# Patient Record
Sex: FEMALE | Race: WHITE | NOT HISPANIC OR LATINO | Employment: OTHER | URBAN - METROPOLITAN AREA
[De-identification: names, ages, dates, MRNs, and addresses within clinical notes are randomized per-mention and may not be internally consistent; named-entity substitution may affect disease eponyms.]

---

## 2018-08-16 ENCOUNTER — OFFICE VISIT (OUTPATIENT)
Dept: URGENT CARE | Facility: PHYSICIAN GROUP | Age: 53
End: 2018-08-16
Payer: MEDICARE

## 2018-08-16 ENCOUNTER — HOSPITAL ENCOUNTER (OUTPATIENT)
Facility: MEDICAL CENTER | Age: 53
End: 2018-08-16
Attending: PHYSICIAN ASSISTANT
Payer: MEDICARE

## 2018-08-16 VITALS
SYSTOLIC BLOOD PRESSURE: 138 MMHG | RESPIRATION RATE: 14 BRPM | WEIGHT: 144.8 LBS | TEMPERATURE: 98.8 F | HEIGHT: 67 IN | OXYGEN SATURATION: 98 % | BODY MASS INDEX: 22.73 KG/M2 | HEART RATE: 53 BPM | DIASTOLIC BLOOD PRESSURE: 84 MMHG

## 2018-08-16 DIAGNOSIS — F41.0 PANIC ATTACK: ICD-10-CM

## 2018-08-16 DIAGNOSIS — I49.3 PVC (PREMATURE VENTRICULAR CONTRACTION): ICD-10-CM

## 2018-08-16 DIAGNOSIS — F41.9 ANXIETY AND DEPRESSION: ICD-10-CM

## 2018-08-16 DIAGNOSIS — F32.A ANXIETY AND DEPRESSION: ICD-10-CM

## 2018-08-16 DIAGNOSIS — Z86.39 HISTORY OF HYPOKALEMIA: ICD-10-CM

## 2018-08-16 PROCEDURE — 80048 BASIC METABOLIC PNL TOTAL CA: CPT

## 2018-08-16 PROCEDURE — 99204 OFFICE O/P NEW MOD 45 MIN: CPT | Performed by: PHYSICIAN ASSISTANT

## 2018-08-16 RX ORDER — LISINOPRIL 10 MG/1
10 TABLET ORAL DAILY
COMMUNITY
End: 2019-02-01 | Stop reason: CLARIF

## 2018-08-16 RX ORDER — ZIPRASIDONE HYDROCHLORIDE 60 MG/1
60 CAPSULE ORAL DAILY
COMMUNITY
End: 2019-02-01

## 2018-08-16 RX ORDER — DULOXETIN HYDROCHLORIDE 60 MG/1
60 CAPSULE, DELAYED RELEASE ORAL DAILY
COMMUNITY
End: 2019-02-15 | Stop reason: SDUPTHER

## 2018-08-16 RX ORDER — POTASSIUM CHLORIDE 20MEQ/15ML
20 LIQUID (ML) ORAL DAILY
COMMUNITY
End: 2019-02-01 | Stop reason: CLARIF

## 2018-08-16 RX ORDER — VALACYCLOVIR HYDROCHLORIDE 1 G/1
1000 TABLET, FILM COATED ORAL
COMMUNITY
End: 2019-07-01 | Stop reason: SDUPTHER

## 2018-08-16 RX ORDER — LORAZEPAM 0.5 MG/1
TABLET ORAL
Qty: 10 TAB | Refills: 0 | Status: SHIPPED | OUTPATIENT
Start: 2018-08-16 | End: 2018-08-20

## 2018-08-16 RX ORDER — TRAZODONE HYDROCHLORIDE 100 MG/1
100 TABLET ORAL NIGHTLY
COMMUNITY
End: 2019-02-03

## 2018-08-16 RX ORDER — DULOXETIN HYDROCHLORIDE 30 MG/1
30 CAPSULE, DELAYED RELEASE ORAL DAILY
COMMUNITY
End: 2018-12-28

## 2018-08-16 RX ORDER — SUCRALFATE 1 G/1
1 TABLET ORAL
COMMUNITY
End: 2019-02-01 | Stop reason: CLARIF

## 2018-08-17 LAB
ANION GAP SERPL CALC-SCNC: 12 MMOL/L (ref 0–11.9)
BUN SERPL-MCNC: 16 MG/DL (ref 8–22)
CALCIUM SERPL-MCNC: 9.8 MG/DL (ref 8.5–10.5)
CHLORIDE SERPL-SCNC: 103 MMOL/L (ref 96–112)
CO2 SERPL-SCNC: 25 MMOL/L (ref 20–33)
CREAT SERPL-MCNC: 0.92 MG/DL (ref 0.5–1.4)
GLUCOSE SERPL-MCNC: 66 MG/DL (ref 65–99)
POTASSIUM SERPL-SCNC: 3.7 MMOL/L (ref 3.6–5.5)
SODIUM SERPL-SCNC: 140 MMOL/L (ref 135–145)

## 2018-08-19 ENCOUNTER — TELEPHONE (OUTPATIENT)
Dept: URGENT CARE | Facility: PHYSICIAN GROUP | Age: 53
End: 2018-08-19

## 2018-08-21 ENCOUNTER — TELEPHONE (OUTPATIENT)
Dept: URGENT CARE | Facility: PHYSICIAN GROUP | Age: 53
End: 2018-08-21

## 2018-08-21 NOTE — PROGRESS NOTES
Chief Complaint   Patient presents with   • Panic Attack     long Hx of anxiety. worse today.       HISTORY OF PRESENT ILLNESS: Patient is a 52 y.o. female who presents today for panic attack/recurrence of anxiety.    Patient states she is currently splitting her time between here and [place] and that she and her  recently assumed roles of grandparents.   She feels this is the main catalyst currently but is also unsure that her medication regimen currently is ideal. She feels her depression is under good control and denies any SI or intent of self harm.   She has psychiatrist in [place] but no care providers in [facility].   She feels she had panic attack today, it is a bit better than few hours ago but still feels stressed.  She notes she has skipped beats and palpitations when this happens.  This has been investigated by Cardiology and has far as she knows after recent testing everything was apparently normal.  She does have two cups of coffee in the morning but otherwise no other stimulants.  She does also note 2 weeks ago she was found to be hypokalemic (unknown cause) and this worries her to get checked again.      There are no active problems to display for this patient.      Allergies:Patient has no known allergies.    Current Outpatient Prescriptions Ordered in [facility]   Medication Sig Dispense Refill   • DULoxetine (CYMBALTA) 30 MG Cap DR Particles Take 30 mg by mouth every day.     • potassium chloride (KAYCIEL) 20 MEQ/15ML (10%) Solution Take 20 mEq by mouth every day.     • Omeprazole (PRILOSEC PO) Take 20 mg by mouth every day.     • ziprasidone (GEODON) 60 MG Cap Take 60 mg by mouth every day.     • traZODone (DESYREL) 150 MG Tab Take 150 mg by mouth every evening.     • DULoxetine (CYMBALTA) 60 MG Cap DR Particles delayed-release capsule Take 60 mg by mouth every day.     • sucralfate (CARAFATE) 1 GM Tab Take 1 g by mouth 4 Times a Day,Before Meals and at Bedtime.     • lisinopril (PRINIVIL)  "10 MG Tab Take 10 mg by mouth every day.     • valacyclovir (VALTREX) 1 GM Tab Take 1,000 mg by mouth Once.       No current Epic-ordered facility-administered medications on file.        History reviewed. No pertinent past medical history.    Social History   Substance Use Topics   • Smoking status: Current Every Day Smoker     Packs/day: 10.00   • Smokeless tobacco: Never Used   • Alcohol use Not on file       No family status information on file.   History reviewed. No pertinent family history.    ROS:  Review of Systems   Constitutional: Negative for fever, chills, weight loss and malaise/fatigue.   HENT: Negative for ear pain, nosebleeds, congestion, sore throat and neck pain.    Eyes: Negative for blurred vision.   Respiratory: Negative for cough, sputum production, shortness of breath and wheezing.    Cardiovascular: SEE HPI  Gastrointestinal: Negative for heartburn, nausea, vomiting and abdominal pain.   Genitourinary: Negative for dysuria, urgency and frequency.   Psych: SEE HPI  All other systems reviewed and are negative.       Exam:  Blood pressure 138/84, pulse (!) 53, temperature 37.1 °C (98.8 °F), resp. rate 14, height 1.702 m (5' 7\"), weight 65.7 kg (144 lb 12.8 oz), SpO2 98 %.  General:  Well nourished, well developed female in NAD  Head: Normocephalic, atraumatic.   Eyes: PERRLA, EOM within normal limits, no conjunctival injection, no scleral icterus, visual fields and acuity grossly intact.  Ears: Normal shape and symmetry, no tenderness, no discharge. External canals are without any significant edema or erythema. Tympanic membranes are without any inflammation, no effusion. Gross auditory acuity is intact  Nose: Symmetrical, sinuses without tenderness, no discharge.   Mouth: reasonable hygiene, no erythema exudates or tonsillar enlargement.  Neck: no masses, range of motion within normal limits, no tracheal deviation. No lymphadenopathy  No thyromegaly.   Pulmonary: Normal respiratory effort, no " wheezes, crackles, or rhonchi.  Cardiovascular: regular rate and rhythm without murmurs, rubs, or gallops.  Skin: No visible rashes or lesion. Warm, pink, dry.   Extremities: no clubbing, cyanosis, or edema.  Neuro: A&O x 3. Speech normal/clear.  Normal gait.   Psych: Slightly anxious mood with normal affect.   She is clear thought content and judgement.  Speech is normal and fluid.       EKG Interpretation   Interpreted by me   Rhythm: normal sinus   Rate: normal   Axis: normal   Ectopy: 1 PVC between two rhythm strips.   Conduction: normal   ST Segments: no acute change   T Waves: no acute change   Q Waves: none   Clinical Impression: occasional PVC, otherwise WNL    Component Results     Component Value Ref Range & Units Status   Sodium 140  135 - 145 mmol/L Final   Potassium 3.7  3.6 - 5.5 mmol/L Final   Chloride 103  96 - 112 mmol/L Final   Co2 25  20 - 33 mmol/L Final   Glucose 66  65 - 99 mg/dL Final   Bun 16  8 - 22 mg/dL Final   Creatinine 0.92  0.50 - 1.40 mg/dL Final   Calcium 9.8  8.5 - 10.5 mg/dL Final   Anion Gap 12.0   0.0 - 11.9 Final         Assessment/Plan:  1. Panic attack  REFERRAL TO FOLLOW-UP WITH PRIMARY CARE    REFERRAL TO BEHAVIORAL HEALTH    LORazepam (ATIVAN) 0.5 MG Tab   2. Anxiety and depression  REFERRAL TO FOLLOW-UP WITH PRIMARY CARE    REFERRAL TO BEHAVIORAL HEALTH   3. PVC (premature ventricular contraction)     4. History of hypokalemia  BASIC METABOLIC PANEL    BASIC METABOLIC PANEL         -EKG without concerning changes currently, PVC noted. Continue to follow up with Cardio as previously instructed in [place].   ER precautions regarding palpitations, chest pain etc discussed however  -Potassium recheck WNL currently.   -reviewed PMH with regards to patient anxiety.  Patient is currently on regimen that will need to be addressed with mental health/psychiatry.  Patient requesting Ativan for acute panic attack in the interim due to increase frequency and success using in the past.    "Patient requesting \"high dose\" however discussed this is not appropriate to start at and not through a UC.   Will give #10 0.5 mg tablets and she may take 1-2 every 8 hours prn.     She is to follow up with new PCP in [place] and Behavioral health.  Referrals placed and ER precautions discussed.   No ETOH on benzodiazepine discussed.          Supportive care, differential diagnoses, and indications for immediate follow-up discussed with patient.   Pathogenesis of diagnosis discussed including typical length and natural progression.   Instructed to return to clinic or nearest emergency department for any change in condition, further concerns, or worsening of symptoms.  Patient states understanding of the plan of care and discharge instructions.  Instructed to make an appointment, for follow up, with their primary care provider.      Lauren Schuler P.A.-C.    "

## 2018-08-21 NOTE — TELEPHONE ENCOUNTER
----- Message from Chinyere Bonilla, Med Ass't sent at 8/19/2018 11:35 AM PDT -----  Regarding: FW: Normal potassium, patient waiting to hear.    [facility] for pt to call back  ----- Message -----  From: Lauren Schuler P.A.-C.  Sent: 8/17/2018   8:22 AM  To: [facility]  Subject: Normal potassium, patient waiting to hear.       Hello!    Please let patient know that her potassium level was 3.7, which is normal.     Thank you!!!!!      ----- Message -----  From: Intf, Lab  Sent: 8/17/2018  12:13 AM  To: Lauren Schuler P.A.-C.

## 2018-10-18 ENCOUNTER — OFFICE VISIT (OUTPATIENT)
Dept: URGENT CARE | Facility: PHYSICIAN GROUP | Age: 53
End: 2018-10-18
Payer: MEDICARE

## 2018-10-18 ENCOUNTER — HOSPITAL ENCOUNTER (EMERGENCY)
Facility: MEDICAL CENTER | Age: 53
End: 2018-10-18
Payer: MEDICARE

## 2018-10-18 VITALS
HEART RATE: 92 BPM | TEMPERATURE: 98.2 F | OXYGEN SATURATION: 100 % | BODY MASS INDEX: 22.15 KG/M2 | SYSTOLIC BLOOD PRESSURE: 131 MMHG | RESPIRATION RATE: 16 BRPM | DIASTOLIC BLOOD PRESSURE: 93 MMHG | HEIGHT: 67 IN | WEIGHT: 141.09 LBS

## 2018-10-18 VITALS
RESPIRATION RATE: 15 BRPM | WEIGHT: 130 LBS | BODY MASS INDEX: 20.4 KG/M2 | HEIGHT: 67 IN | TEMPERATURE: 98.5 F | HEART RATE: 90 BPM | OXYGEN SATURATION: 99 % | DIASTOLIC BLOOD PRESSURE: 70 MMHG | SYSTOLIC BLOOD PRESSURE: 118 MMHG

## 2018-10-18 DIAGNOSIS — R07.89 CHEST DISCOMFORT: ICD-10-CM

## 2018-10-18 DIAGNOSIS — Z86.59 HISTORY OF PANIC ATTACKS: ICD-10-CM

## 2018-10-18 LAB — EKG IMPRESSION: NORMAL

## 2018-10-18 PROCEDURE — 93005 ELECTROCARDIOGRAM TRACING: CPT

## 2018-10-18 PROCEDURE — 302449 STATCHG TRIAGE ONLY (STATISTIC)

## 2018-10-18 PROCEDURE — 99214 OFFICE O/P EST MOD 30 MIN: CPT | Performed by: PHYSICIAN ASSISTANT

## 2018-10-18 RX ORDER — ASPIRIN 81 MG/1
324 TABLET, CHEWABLE ORAL ONCE
Status: COMPLETED | OUTPATIENT
Start: 2018-10-18 | End: 2018-10-18

## 2018-10-18 RX ADMIN — ASPIRIN 324 MG: 81 TABLET, CHEWABLE ORAL at 06:00

## 2018-10-18 ASSESSMENT — PAIN SCALES - GENERAL: PAINLEVEL_OUTOF10: 2

## 2018-10-19 NOTE — PROGRESS NOTES
"Chief Complaint   Patient presents with   • Chest Pain     anxiety attack        HISTORY OF PRESENT ILLNESS: Patient is a 52 y.o. female who presents today for about 6-7 hours of waxing and waning severity of chest discomfort/chest pressure. Started in the morning, felt it doing chores around the house.   Patient states it is slightly better currently from when it started however is still present.  Patient states she is SOB and feels her heart is racing.  Patient has had symptoms of chest pressure and discomfort in the past and also has hx of panic attacks.  Patient does admit this pain however \"feels different\" ,  \"new chest pain\"    \"I just don't feel right\" she states.  Patient has had a cardiac work up in the past and as far as she knows everything has been normal.  She does have 1 ppd smoking hx.  No leg swelling.   Patient does endorse feeling anxious.     There are no active problems to display for this patient.      Allergies:Patient has no known allergies.    Current Outpatient Prescriptions Ordered in [facility]   Medication Sig Dispense Refill   • DULoxetine (CYMBALTA) 30 MG Cap DR Particles Take 30 mg by mouth every day.     • potassium chloride (KAYCIEL) 20 MEQ/15ML (10%) Solution Take 20 mEq by mouth every day.     • Omeprazole (PRILOSEC PO) Take 20 mg by mouth every day.     • ziprasidone (GEODON) 60 MG Cap Take 60 mg by mouth every day.     • traZODone (DESYREL) 150 MG Tab Take 150 mg by mouth every evening.     • DULoxetine (CYMBALTA) 60 MG Cap DR Particles delayed-release capsule Take 60 mg by mouth every day.     • sucralfate (CARAFATE) 1 GM Tab Take 1 g by mouth 4 Times a Day,Before Meals and at Bedtime.     • lisinopril (PRINIVIL) 10 MG Tab Take 10 mg by mouth every day.     • valacyclovir (VALTREX) 1 GM Tab Take 1,000 mg by mouth Once.       No current Epic-ordered facility-administered medications on file.        No past medical history on file.    Social History   Substance Use Topics   • Smoking " "status: Current Every Day Smoker     Packs/day: 1.00   • Smokeless tobacco: Never Used   • Alcohol use Not on file       No family status information on file.   No family history on file.     ROS:  Review of Systems   Constitutional: Negative for fever, chills, weight loss and malaise/fatigue.   HENT: Negative for ear pain, nosebleeds, congestion, sore throat and neck pain.    Eyes: Negative for blurred vision.   Respiratory: SEE HPI  Cardiovascular: SEE HPI  Gastrointestinal: Negative for heartburn, nausea, vomiting and abdominal pain.   Genitourinary: Negative for dysuria, urgency and frequency.   Psych: SEE HPI    Exam:  Blood pressure 118/70, pulse 90, temperature 36.9 °C (98.5 °F), temperature source Temporal, resp. rate 15, height 1.702 m (5' 7\"), weight 59 kg (130 lb), SpO2 99 %.  General:  Well nourished, well developed female in NAD however is anxious appearing.   Eyes: PERRLA, EOM within normal limits, no conjunctival injection, no scleral icterus, visual fields and acuity grossly intact.  Ears: Normal shape and symmetry, no tenderness, no discharge. External canals are without any significant edema or erythema. Tympanic membranes are without any inflammation, no effusion. Gross auditory acuity is intact  Nose: Symmetrical, sinuses without tenderness, no discharge.   Mouth: reasonable hygiene, no erythema exudates or tonsillar enlargement.  Neck: no masses, range of motion within normal limits, no tracheal deviation. No lymphadenopathy  Pulmonary: Increased resp effort at times,  no wheezes, crackles, or rhonchi.  Cardiovascular: regular rate and rhythm without murmurs, rubs, or gallops.  Skin: No visible rashes or lesion. Warm, pink, dry.   Extremities: no clubbing, cyanosis, or edema.  Neuro: A&O x 3. Speech normal/clear.  Normal gait.       Assessment/Plan:  1. Chest discomfort  EKG    UC AMA/Refusal of Treatment    aspirin (ASA) chewable tab 324 mg   2. History of panic attacks           -new EKG " changes noted in setting of acute chest pain, per patient feels different from the chest tightness she gets from panic attacks.  She notes she is slightly better this afternoon however discussed that I recommend further evaluation given presentation.   Urged EMS transport however patient and  decline. AMA signed.  Patient did take aspirin as above and left POV with  stating she would proceed straight to ED.       Lauren Schuler P.A.-C.

## 2018-10-19 NOTE — ED TRIAGE NOTES
Arabella Anglin  52 y.o.  Chief Complaint   Patient presents with   • Chest Pain     sudden onset this morning, denies ripping/tearning sensation; mid chest pain, non radiating.   • Shortness of Breath   • Anxiety     EKG completed.    Explained wait time and triage process to pt. Pt placed back out in lobby, told to notify ED tech or triage RN of any changes, verbalized understanding.

## 2018-10-24 ENCOUNTER — OFFICE VISIT (OUTPATIENT)
Dept: URGENT CARE | Facility: PHYSICIAN GROUP | Age: 53
End: 2018-10-24
Payer: MEDICARE

## 2018-10-24 VITALS
BODY MASS INDEX: 23.54 KG/M2 | SYSTOLIC BLOOD PRESSURE: 122 MMHG | TEMPERATURE: 98.5 F | HEART RATE: 77 BPM | RESPIRATION RATE: 16 BRPM | OXYGEN SATURATION: 97 % | DIASTOLIC BLOOD PRESSURE: 74 MMHG | WEIGHT: 150 LBS | HEIGHT: 67 IN

## 2018-10-24 DIAGNOSIS — R06.2 WHEEZING: ICD-10-CM

## 2018-10-24 DIAGNOSIS — J01.00 ACUTE NON-RECURRENT MAXILLARY SINUSITIS: ICD-10-CM

## 2018-10-24 PROCEDURE — 99214 OFFICE O/P EST MOD 30 MIN: CPT | Performed by: PHYSICIAN ASSISTANT

## 2018-10-24 RX ORDER — ALBUTEROL SULFATE 90 UG/1
2 AEROSOL, METERED RESPIRATORY (INHALATION) EVERY 6 HOURS PRN
Qty: 8.5 G | Refills: 0 | Status: SHIPPED | OUTPATIENT
Start: 2018-10-24 | End: 2018-12-28

## 2018-10-24 RX ORDER — FLUTICASONE PROPIONATE 50 MCG
2 SPRAY, SUSPENSION (ML) NASAL DAILY
Qty: 16 G | Refills: 0 | Status: SHIPPED | OUTPATIENT
Start: 2018-10-24 | End: 2018-12-28

## 2018-10-24 RX ORDER — AMOXICILLIN AND CLAVULANATE POTASSIUM 875; 125 MG/1; MG/1
1 TABLET, FILM COATED ORAL 2 TIMES DAILY
Qty: 14 TAB | Refills: 0 | Status: SHIPPED | OUTPATIENT
Start: 2018-10-24 | End: 2018-10-31

## 2018-10-24 RX ORDER — IBUPROFEN 800 MG/1
800 TABLET ORAL EVERY 8 HOURS PRN
Qty: 30 TAB | Refills: 0 | Status: SHIPPED | OUTPATIENT
Start: 2018-10-24 | End: 2018-12-28

## 2018-10-24 ASSESSMENT — ENCOUNTER SYMPTOMS
SHORTNESS OF BREATH: 0
CHILLS: 0
DIZZINESS: 0
SINUS PRESSURE: 1
NAUSEA: 0
VOMITING: 0
FEVER: 0
SORE THROAT: 1
MUSCULOSKELETAL NEGATIVE: 1
SPUTUM PRODUCTION: 0
SINUS PAIN: 1
COUGH: 1
WHEEZING: 1
ABDOMINAL PAIN: 0
DIARRHEA: 0
HEADACHES: 1

## 2018-10-24 NOTE — PROGRESS NOTES
"Subjective:      Arabella Anglin is a 52 y.o. female who presents with Cough (chest congerstion, sinsus headache x 4 days )            Sinus Problem   This is a new problem. The current episode started in the past 7 days. The problem is unchanged. There has been no fever. Her pain is at a severity of 0/10. She is experiencing no pain. Associated symptoms include congestion, coughing, ear pain, headaches, sinus pressure and a sore throat. Pertinent negatives include no chills or shortness of breath. Past treatments include nothing.       Review of Systems   Constitutional: Negative for chills and fever.   HENT: Positive for congestion, ear pain, sinus pain, sinus pressure and sore throat.    Respiratory: Positive for cough and wheezing. Negative for sputum production and shortness of breath.    Cardiovascular: Negative for chest pain.   Gastrointestinal: Negative for abdominal pain, diarrhea, nausea and vomiting.   Genitourinary: Negative.    Musculoskeletal: Negative.    Skin: Negative for rash.   Neurological: Positive for headaches. Negative for dizziness.        Objective:     /74   Pulse 77   Temp 36.9 °C (98.5 °F) (Temporal)   Resp 16   Ht 1.702 m (5' 7\")   Wt 68 kg (150 lb)   SpO2 97%   BMI 23.49 kg/m²      Physical Exam   Constitutional: She is oriented to person, place, and time. She appears well-developed and well-nourished. No distress.   HENT:   Head: Normocephalic and atraumatic.   Right Ear: Hearing, tympanic membrane, external ear and ear canal normal.   Left Ear: Hearing, tympanic membrane, external ear and ear canal normal.   Nose: Mucosal edema and rhinorrhea present. Right sinus exhibits maxillary sinus tenderness. Right sinus exhibits no frontal sinus tenderness. Left sinus exhibits maxillary sinus tenderness. Left sinus exhibits no frontal sinus tenderness.   Mouth/Throat: Oropharynx is clear and moist. No posterior oropharyngeal edema or posterior oropharyngeal erythema. "   Eyes: Pupils are equal, round, and reactive to light. Conjunctivae are normal. Right eye exhibits no discharge. Left eye exhibits no discharge.   Neck: Normal range of motion.   Cardiovascular: Normal rate, regular rhythm and normal heart sounds.    No murmur heard.  Pulmonary/Chest: Effort normal. No respiratory distress. She has wheezes. She has no rales.   Musculoskeletal: Normal range of motion.   Lymphadenopathy:     She has no cervical adenopathy.   Neurological: She is alert and oriented to person, place, and time.   Skin: Skin is warm and dry. She is not diaphoretic.   Psychiatric: She has a normal mood and affect. Her behavior is normal.   Nursing note and vitals reviewed.         PMH:  has no past medical history on file.  MEDS:   Current Outpatient Prescriptions:   •  amoxicillin-clavulanate (AUGMENTIN) 875-125 MG Tab, Take 1 Tab by mouth 2 times a day for 7 days., Disp: 14 Tab, Rfl: 0  •  fluticasone (FLONASE) 50 MCG/ACT nasal spray, Spray 2 Sprays in nose every day., Disp: 16 g, Rfl: 0  •  ibuprofen (MOTRIN) 800 MG Tab, Take 1 Tab by mouth every 8 hours as needed., Disp: 30 Tab, Rfl: 0  •  albuterol 108 (90 Base) MCG/ACT Aero Soln inhalation aerosol, Inhale 2 Puffs by mouth every 6 hours as needed for Shortness of Breath., Disp: 8.5 g, Rfl: 0  •  DULoxetine (CYMBALTA) 30 MG Cap DR Particles, Take 30 mg by mouth every day., Disp: , Rfl:   •  potassium chloride (KAYCIEL) 20 MEQ/15ML (10%) Solution, Take 20 mEq by mouth every day., Disp: , Rfl:   •  Omeprazole (PRILOSEC PO), Take 20 mg by mouth every day., Disp: , Rfl:   •  ziprasidone (GEODON) 60 MG Cap, Take 60 mg by mouth every day., Disp: , Rfl:   •  traZODone (DESYREL) 150 MG Tab, Take 150 mg by mouth every evening., Disp: , Rfl:   •  DULoxetine (CYMBALTA) 60 MG Cap DR Particles delayed-release capsule, Take 60 mg by mouth every day., Disp: , Rfl:   •  sucralfate (CARAFATE) 1 GM Tab, Take 1 g by mouth 4 Times a Day,Before Meals and at Bedtime.,  Disp: , Rfl:   •  lisinopril (PRINIVIL) 10 MG Tab, Take 10 mg by mouth every day., Disp: , Rfl:   •  valacyclovir (VALTREX) 1 GM Tab, Take 1,000 mg by mouth Once., Disp: , Rfl:   ALLERGIES: No Known Allergies  SURGHX: History reviewed. No pertinent surgical history.  SOCHX:  reports that she has been smoking.  She has been smoking about 0.50 packs per day. She has never used smokeless tobacco. She reports that she uses drugs, including Inhaled. She reports that she does not drink alcohol.  FH: family history is not on file.       Assessment/Plan:     1. Acute non-recurrent maxillary sinusitis  - amoxicillin-clavulanate (AUGMENTIN) 875-125 MG Tab; Take 1 Tab by mouth 2 times a day for 7 days.  Dispense: 14 Tab; Refill: 0  - fluticasone (FLONASE) 50 MCG/ACT nasal spray; Spray 2 Sprays in nose every day.  Dispense: 16 g; Refill: 0  - ibuprofen (MOTRIN) 800 MG Tab; Take 1 Tab by mouth every 8 hours as needed.  Dispense: 30 Tab; Refill: 0    Advised patient symptoms are most likely viral in etiology, recommend supportive care. Increased fluids and rest. Discussed use of nedi-pot, humidifier, and Flonase nasal spray for symptomatic relief. Contingent course of antibiotics given if symptoms persist/worsen over the next 5-7 days. The patient demonstrated a good understanding and agreed with the treatment plan.    2. Wheezing  - albuterol 108 (90 Base) MCG/ACT Aero Soln inhalation aerosol; Inhale 2 Puffs by mouth every 6 hours as needed for Shortness of Breath.  Dispense: 8.5 g; Refill: 0

## 2018-12-28 ENCOUNTER — OFFICE VISIT (OUTPATIENT)
Dept: URGENT CARE | Facility: PHYSICIAN GROUP | Age: 53
End: 2018-12-28
Payer: MEDICARE

## 2018-12-28 VITALS
WEIGHT: 155 LBS | BODY MASS INDEX: 24.33 KG/M2 | HEIGHT: 67 IN | SYSTOLIC BLOOD PRESSURE: 120 MMHG | TEMPERATURE: 97.4 F | DIASTOLIC BLOOD PRESSURE: 70 MMHG | OXYGEN SATURATION: 96 % | HEART RATE: 66 BPM

## 2018-12-28 DIAGNOSIS — J02.0 ACUTE STREPTOCOCCAL PHARYNGITIS: ICD-10-CM

## 2018-12-28 LAB
INT CON NEG: NORMAL
INT CON POS: NORMAL
S PYO AG THROAT QL: POSITIVE

## 2018-12-28 PROCEDURE — 99214 OFFICE O/P EST MOD 30 MIN: CPT | Performed by: PHYSICIAN ASSISTANT

## 2018-12-28 PROCEDURE — 87880 STREP A ASSAY W/OPTIC: CPT | Performed by: PHYSICIAN ASSISTANT

## 2018-12-28 RX ORDER — AMOXICILLIN 875 MG/1
875 TABLET, COATED ORAL 2 TIMES DAILY
Qty: 20 TAB | Refills: 0 | Status: SHIPPED | OUTPATIENT
Start: 2018-12-28 | End: 2019-02-01 | Stop reason: CLARIF

## 2018-12-29 NOTE — PROGRESS NOTES
Chief Complaint   Patient presents with   • Pharyngitis     Swollen glands, hurts to eat and swollow x 2 days        HISTORY OF PRESENT ILLNESS: Patient is a 53 y.o. female who presents today for 2 days of worsening sore throat, body aches.   She has not had URI symptoms, notes slight lingering cough from the last few weeks.  Sore throat is diffuse, aching and notes most pain when trying to swallow. No nausea or vomiting. No abdominal pain.  Has not taken anything today for her symptoms.     There are no active problems to display for this patient.      Allergies:Patient has no known allergies.    Current Outpatient Prescriptions Ordered in [facility]   Medication Sig Dispense Refill   • traZODone (DESYREL) 150 MG Tab Take 150 mg by mouth every evening.     • DULoxetine (CYMBALTA) 60 MG Cap DR Particles delayed-release capsule Take 60 mg by mouth every day.     • valacyclovir (VALTREX) 1 GM Tab Take 1,000 mg by mouth Once.     • potassium chloride (KAYCIEL) 20 MEQ/15ML (10%) Solution Take 20 mEq by mouth every day.     • Omeprazole (PRILOSEC PO) Take 20 mg by mouth every day.     • ziprasidone (GEODON) 60 MG Cap Take 60 mg by mouth every day.     • sucralfate (CARAFATE) 1 GM Tab Take 1 g by mouth 4 Times a Day,Before Meals and at Bedtime.     • lisinopril (PRINIVIL) 10 MG Tab Take 10 mg by mouth every day.       No current [facility]-ordered facility-administered medications on file.        No past medical history on file.    Social History   Substance Use Topics   • Smoking status: Current Every Day Smoker     Packs/day: 0.50   • Smokeless tobacco: Never Used   • Alcohol use No       No family status information on file.   No family history on file.    ROS:  Review of Systems   Constitutional: SEE HPI  HENT: SEE HPI  Eyes: Negative for blurred vision.   Respiratory:  Slight coughing without , sputum production, shortness of breath or wheezing.    Cardiovascular: Negative for chest pain, palpitations, orthopnea and leg  "swelling.   Gastrointestinal: Negative for heartburn, nausea, vomiting and abdominal pain.   Genitourinary: Negative for dysuria, urgency and frequency.     Exam:  Blood pressure 120/70, pulse 66, temperature 36.3 °C (97.4 °F), temperature source Temporal, height 1.702 m (5' 7\"), weight 70.3 kg (155 lb), SpO2 96 %.  General:  Well nourished, well developed female in NAD  Eyes: PERRLA, EOM within normal limits, no conjunctival injection, no scleral icterus, visual fields and acuity grossly intact.  Ears: Normal shape and symmetry, no tenderness, no discharge. External canals are without any significant edema or erythema. Tympanic membranes are without any inflammation, no effusion. Gross auditory acuity is intact  Nose: Symmetrical, sinuses without tenderness, no discharge.   Mouth: reasonable hygiene, moderate diffuse  erythema without exudates.  Tonsils appear surgically absent.   Neck: no masses, range of motion within normal limits, no tracheal deviation. No lymphadenopathy  Pulmonary: Normal respiratory effort, no wheezes, crackles, or rhonchi.  Cardiovascular: regular rate and rhythm without murmurs, rubs, or gallops.  Skin: No visible rashes or lesion. Warm, pink, dry.   Extremities: no clubbing, cyanosis, or edema.  Neuro: A&O x 3. Speech normal/clear.  Normal gait.       Assessment/Plan:  1. Acute streptococcal pharyngitis  POCT Rapid Strep A    amoxicillin (AMOXIL) 875 MG tablet       -POCT strep -POS  -fluids emphasized. Alternating Tylenol/Motrin prn pain/inflammation/fever  -new tooth brush.     -RTC precautions discussed such as worsening sore throat despite abx, worsening fevers, increased difficulty swallowing or breathing, drooling, etc.        Supportive care, differential diagnoses, and indications for immediate follow-up discussed with patient.   Pathogenesis of diagnosis discussed including typical length and natural progression.   Instructed to return to clinic or nearest emergency department " for any change in condition, further concerns, or worsening of symptoms.  Patient states understanding of the plan of care and discharge instructions.        Lauren Schuler P.A.-C.

## 2019-01-15 ENCOUNTER — TELEPHONE (OUTPATIENT)
Dept: SCHEDULING | Facility: IMAGING CENTER | Age: 54
End: 2019-01-15

## 2019-01-24 ENCOUNTER — HOSPITAL ENCOUNTER (EMERGENCY)
Facility: MEDICAL CENTER | Age: 54
End: 2019-01-24
Attending: EMERGENCY MEDICINE
Payer: MEDICARE

## 2019-01-24 VITALS
BODY MASS INDEX: 22.58 KG/M2 | HEART RATE: 98 BPM | WEIGHT: 144.18 LBS | RESPIRATION RATE: 24 BRPM | SYSTOLIC BLOOD PRESSURE: 146 MMHG | OXYGEN SATURATION: 99 % | TEMPERATURE: 96.6 F | DIASTOLIC BLOOD PRESSURE: 95 MMHG

## 2019-01-24 DIAGNOSIS — F41.9 ANXIETY: ICD-10-CM

## 2019-01-24 DIAGNOSIS — G89.4 CHRONIC PAIN SYNDROME: ICD-10-CM

## 2019-01-24 LAB
EKG IMPRESSION: NORMAL
POC BREATHALIZER: 0.01 PERCENT (ref 0–0.01)

## 2019-01-24 PROCEDURE — 93005 ELECTROCARDIOGRAM TRACING: CPT | Performed by: EMERGENCY MEDICINE

## 2019-01-24 PROCEDURE — 302970 POC BREATHALIZER: Performed by: EMERGENCY MEDICINE

## 2019-01-24 PROCEDURE — 93005 ELECTROCARDIOGRAM TRACING: CPT

## 2019-01-24 PROCEDURE — 99283 EMERGENCY DEPT VISIT LOW MDM: CPT

## 2019-01-24 NOTE — ED TRIAGE NOTES
Patient has chronic history of anxiety, comes in today with multiple complaints related to this.   Patient is out of her anxiety medication, she has a history of chronic back pain on pain medication that she is out of, she is out of her medication for the last two weeks.  Patient has a pcp appointment in two weeks she did not call him because she states she is fed up.  EKG done, vitals otherwise stable.  Patient appears very anxious.

## 2019-01-24 NOTE — ED PROVIDER NOTES
ED Provider Note    CHIEF COMPLAINT  Chief Complaint   Patient presents with   • Anxiety       HPI  Arabella Anglin is a 53 y.o. female who presents complaining of anxiety and chronic pain.  She recently moved up here from [place] and is trying to establish with a pain management physician.  She currently is out of all of her narcotic pain medications as well as anxiety medications.  She states that she is sleeping all the time and feels like something is wrong with her.  She wants to get in with the doctor that can prescribe her medications.  She denies any suicidal or homicidal ideation.  She denies any drug use except for marijuana.  She states that she has even been too tired for the last couple of days to smoke marijuana.  She denies any alcohol use.  She does smoke cigarettes.  She denies any fevers chills cough or cold symptoms rashes leg swelling or joint aches.    REVIEW OF SYSTEMS  See HPI for further details. All other systems are negative.     PAST MEDICAL HISTORY  No past medical history on file.    FAMILY HISTORY  No family history on file.    SOCIAL HISTORY  Social History     Social History   • Marital status: Single     Spouse name: N/A   • Number of children: N/A   • Years of education: N/A     Social History Main Topics   • Smoking status: Current Every Day Smoker     Packs/day: 0.50   • Smokeless tobacco: Never Used   • Alcohol use No   • Drug use: Yes     Types: Inhaled      Comment: marijuana   • Sexual activity: Not on file     Other Topics Concern   • Not on file     Social History Narrative   • No narrative on file       SURGICAL HISTORY  No past surgical history on file.    CURRENT MEDICATIONS  Home Medications     Reviewed by Joan Matthews R.N. (Registered Nurse) on 01/24/19 at 1432  Med List Status: <None>   Medication Last Dose Status   amoxicillin (AMOXIL) 875 MG tablet  Active   DULoxetine (CYMBALTA) 60 MG Cap DR Particles delayed-release capsule  Active   lisinopril (PRINIVIL) 10  MG Tab  Active   Omeprazole (PRILOSEC PO)  Active   potassium chloride (KAYCIEL) 20 MEQ/15ML (10%) Solution  Active   sucralfate (CARAFATE) 1 GM Tab  Active   traZODone (DESYREL) 150 MG Tab  Active   valacyclovir (VALTREX) 1 GM Tab  Active   ziprasidone (GEODON) 60 MG Cap  Active                ALLERGIES  No Known Allergies    PHYSICAL EXAM  VITAL SIGNS: /95   Pulse 98   Temp 35.9 °C (96.6 °F) (Temporal)   Resp (!) 24   Wt 65.4 kg (144 lb 2.9 oz)   SpO2 99%   BMI 22.58 kg/m²  Room air O2: 99    Constitutional: Well developed, Well nourished, No acute distress, Non-toxic appearance.   HENT: Normocephalic, Atraumatic, Bilateral external ears normal, Oropharynx moist, No oral exudates, Nose normal.   Eyes: PERRLA, EOMI, Conjunctiva normal, No discharge.   Neck: Normal range of motion, No tenderness, Supple, No stridor.   Cardiovascular: Normal heart rate, Normal rhythm, No murmurs, No rubs, No gallops.   Thorax & Lungs: Normal breath sounds, No respiratory distress, No wheezing, No chest tenderness.  Abdomen: Bowel sounds normal, Soft, No tenderness, No masses, No pulsatile masses.    Skin: Warm, Dry, No erythema, No rash.   Extremities: Intact distal pulses, No edema, No tenderness, No cyanosis, No clubbing.   Neurologic: Full range of motion all extremities  Psychiatric: Admits anxiety and depression denies any suicidal or homicidal ideation        COURSE & MEDICAL DECISION MAKING  Pertinent Labs & Imaging studies reviewed. (See chart for details)  Differential diagnosis: Anxiety, chronic pain, hypothyroidism, anemia, cardiac ischemia    Results for orders placed or performed during the hospital encounter of 01/24/19   POC BREATHALIZER   Result Value Ref Range    POC Breathalizer 0.01 0.00 - 0.01 Percent   EKG (NOW)   Result Value Ref Range    Report       [facility]    Test Date:  2019-01-24  Pt Name:    NOAH GORDON                 Department: ER  MRN:        [id number]                       Room:  Gender:     Female                       Technician: [id number]  :        1965                   Requested By:ER TRIAGE PROTOCOL  Order #:    [id number]                    Reading MD: APRIL DISLA MD    Measurements  Intervals                                Axis  Rate:       57                           P:          70  DC:         156                          QRS:        33  QRSD:       86                           T:          35  QT:         460  QTc:        448    Interpretive Statements  SINUS BRADYCARDIA  LEFT ATRIAL ABNORMALITY  PROBABLE LEFT VENTRICULAR HYPERTROPHY  Compared to ECG 10/18/2018 18:19:56  Sinus rhythm no longer present    Electronically Signed On 2019 16:30:21 PST by APRIL DISLA MD          I did order lab work on the patient and spoke with [facility] to evaluate her for her symptoms.  I explained to her that we are unable to prescribe her any narcotic or anxiety medications through the emergency department.  She refused the blood work.  She was waiting to be seen by [facility] when she decided she did not want to stay any longer.  Patient does not meet to area for legal hold and so I did not send anyone after her.        FINAL IMPRESSION  1. Anxiety    2. Chronic pain syndrome        Disposition  Left without being seen by consultants or getting blood work    Electronically signed by: April Disla, 2019 3:03 PM

## 2019-01-24 NOTE — ED NOTES
"Pt currently refusing labs. RN educated patient on importance of lab draw. Pt states, \"I took something last night, it is going to show up.\"  RN educated patient on lab orders.  ERP notified.   "

## 2019-01-25 NOTE — DISCHARGE PLANNING
Alert team  note:   consult evaluation ordered for pt, writer RN notified and told [facility] ED RN, Ritu, pt to be evaluated after other evaluations on other pts completed; writer RN went to evaluate pt today, 1/24/19, at 1700, and pt had been DC'd to serlf; per [facility] ED RN, pt denies SI and HI

## 2019-02-01 ENCOUNTER — APPOINTMENT (OUTPATIENT)
Dept: RADIOLOGY | Facility: MEDICAL CENTER | Age: 54
End: 2019-02-01
Attending: EMERGENCY MEDICINE
Payer: MEDICARE

## 2019-02-01 ENCOUNTER — HOSPITAL ENCOUNTER (OUTPATIENT)
Facility: MEDICAL CENTER | Age: 54
End: 2019-02-01
Attending: EMERGENCY MEDICINE | Admitting: HOSPITALIST
Payer: MEDICARE

## 2019-02-01 VITALS
OXYGEN SATURATION: 99 % | SYSTOLIC BLOOD PRESSURE: 165 MMHG | HEIGHT: 67 IN | HEART RATE: 47 BPM | WEIGHT: 140 LBS | RESPIRATION RATE: 14 BRPM | BODY MASS INDEX: 21.97 KG/M2 | DIASTOLIC BLOOD PRESSURE: 60 MMHG | TEMPERATURE: 98.6 F

## 2019-02-01 DIAGNOSIS — R10.9 ABDOMINAL PAIN, UNSPECIFIED ABDOMINAL LOCATION: ICD-10-CM

## 2019-02-01 PROBLEM — R10.84 GENERALIZED ABDOMINAL PAIN: Status: ACTIVE | Noted: 2019-02-01

## 2019-02-01 PROBLEM — M79.7 FIBROMYALGIA: Status: ACTIVE | Noted: 2019-02-01

## 2019-02-01 PROBLEM — Z72.0 TOBACCO USE: Status: ACTIVE | Noted: 2019-02-01

## 2019-02-01 PROBLEM — I49.3 PVC'S (PREMATURE VENTRICULAR CONTRACTIONS): Status: ACTIVE | Noted: 2019-02-01

## 2019-02-01 PROBLEM — F41.9 ANXIETY: Status: ACTIVE | Noted: 2019-02-01

## 2019-02-01 PROBLEM — R63.4 WEIGHT LOSS: Status: ACTIVE | Noted: 2019-02-01

## 2019-02-01 LAB
ALBUMIN SERPL BCP-MCNC: 4.5 G/DL (ref 3.2–4.9)
ALBUMIN/GLOB SERPL: 1.8 G/DL
ALP SERPL-CCNC: 41 U/L (ref 30–99)
ALT SERPL-CCNC: 12 U/L (ref 2–50)
ANION GAP SERPL CALC-SCNC: 11 MMOL/L (ref 0–11.9)
APPEARANCE UR: ABNORMAL
AST SERPL-CCNC: 18 U/L (ref 12–45)
BACTERIA #/AREA URNS HPF: NEGATIVE /HPF
BASOPHILS # BLD AUTO: 0.5 % (ref 0–1.8)
BASOPHILS # BLD: 0.04 K/UL (ref 0–0.12)
BILIRUB SERPL-MCNC: 0.6 MG/DL (ref 0.1–1.5)
BILIRUB UR QL STRIP.AUTO: NEGATIVE
BUN SERPL-MCNC: 19 MG/DL (ref 8–22)
CALCIUM SERPL-MCNC: 9.4 MG/DL (ref 8.5–10.5)
CHLORIDE SERPL-SCNC: 106 MMOL/L (ref 96–112)
CO2 SERPL-SCNC: 22 MMOL/L (ref 20–33)
COLOR UR: YELLOW
CREAT SERPL-MCNC: 0.89 MG/DL (ref 0.5–1.4)
EKG IMPRESSION: NORMAL
EOSINOPHIL # BLD AUTO: 0.01 K/UL (ref 0–0.51)
EOSINOPHIL NFR BLD: 0.1 % (ref 0–6.9)
EPI CELLS #/AREA URNS HPF: NEGATIVE /HPF
ERYTHROCYTE [DISTWIDTH] IN BLOOD BY AUTOMATED COUNT: 44.1 FL (ref 35.9–50)
GLOBULIN SER CALC-MCNC: 2.5 G/DL (ref 1.9–3.5)
GLUCOSE SERPL-MCNC: 132 MG/DL (ref 65–99)
GLUCOSE UR STRIP.AUTO-MCNC: NEGATIVE MG/DL
HCG SERPL QL: NEGATIVE
HCT VFR BLD AUTO: 43.1 % (ref 37–47)
HGB BLD-MCNC: 14.8 G/DL (ref 12–16)
HYALINE CASTS #/AREA URNS LPF: ABNORMAL /LPF
IMM GRANULOCYTES # BLD AUTO: 0.02 K/UL (ref 0–0.11)
IMM GRANULOCYTES NFR BLD AUTO: 0.2 % (ref 0–0.9)
KETONES UR STRIP.AUTO-MCNC: ABNORMAL MG/DL
LEUKOCYTE ESTERASE UR QL STRIP.AUTO: NEGATIVE
LIPASE SERPL-CCNC: 75 U/L (ref 11–82)
LYMPHOCYTES # BLD AUTO: 1.2 K/UL (ref 1–4.8)
LYMPHOCYTES NFR BLD: 14.4 % (ref 22–41)
MCH RBC QN AUTO: 33.6 PG (ref 27–33)
MCHC RBC AUTO-ENTMCNC: 34.3 G/DL (ref 33.6–35)
MCV RBC AUTO: 98 FL (ref 81.4–97.8)
MICRO URNS: ABNORMAL
MONOCYTES # BLD AUTO: 0.3 K/UL (ref 0–0.85)
MONOCYTES NFR BLD AUTO: 3.6 % (ref 0–13.4)
NEUTROPHILS # BLD AUTO: 6.76 K/UL (ref 2–7.15)
NEUTROPHILS NFR BLD: 81.2 % (ref 44–72)
NITRITE UR QL STRIP.AUTO: NEGATIVE
NRBC # BLD AUTO: 0 K/UL
NRBC BLD-RTO: 0 /100 WBC
PH UR STRIP.AUTO: 7.5 [PH]
PLATELET # BLD AUTO: 150 K/UL (ref 164–446)
PMV BLD AUTO: 10.9 FL (ref 9–12.9)
POTASSIUM SERPL-SCNC: 3.4 MMOL/L (ref 3.6–5.5)
PROT SERPL-MCNC: 7 G/DL (ref 6–8.2)
PROT UR QL STRIP: NEGATIVE MG/DL
RBC # BLD AUTO: 4.4 M/UL (ref 4.2–5.4)
RBC # URNS HPF: ABNORMAL /HPF
RBC UR QL AUTO: NEGATIVE
SODIUM SERPL-SCNC: 139 MMOL/L (ref 135–145)
SP GR UR STRIP.AUTO: 1.02
UROBILINOGEN UR STRIP.AUTO-MCNC: 0.2 MG/DL
WBC # BLD AUTO: 8.3 K/UL (ref 4.8–10.8)
WBC #/AREA URNS HPF: ABNORMAL /HPF

## 2019-02-01 PROCEDURE — 93005 ELECTROCARDIOGRAM TRACING: CPT

## 2019-02-01 PROCEDURE — 96375 TX/PRO/DX INJ NEW DRUG ADDON: CPT

## 2019-02-01 PROCEDURE — 36415 COLL VENOUS BLD VENIPUNCTURE: CPT

## 2019-02-01 PROCEDURE — 99219 PR INITIAL OBSERVATION CARE,LEVL II: CPT | Performed by: HOSPITALIST

## 2019-02-01 PROCEDURE — 80053 COMPREHEN METABOLIC PANEL: CPT

## 2019-02-01 PROCEDURE — 85025 COMPLETE CBC W/AUTO DIFF WBC: CPT

## 2019-02-01 PROCEDURE — 99285 EMERGENCY DEPT VISIT HI MDM: CPT

## 2019-02-01 PROCEDURE — 96376 TX/PRO/DX INJ SAME DRUG ADON: CPT

## 2019-02-01 PROCEDURE — 81001 URINALYSIS AUTO W/SCOPE: CPT

## 2019-02-01 PROCEDURE — G0378 HOSPITAL OBSERVATION PER HR: HCPCS

## 2019-02-01 PROCEDURE — 93005 ELECTROCARDIOGRAM TRACING: CPT | Performed by: EMERGENCY MEDICINE

## 2019-02-01 PROCEDURE — 84703 CHORIONIC GONADOTROPIN ASSAY: CPT

## 2019-02-01 PROCEDURE — 74177 CT ABD & PELVIS W/CONTRAST: CPT

## 2019-02-01 PROCEDURE — 700111 HCHG RX REV CODE 636 W/ 250 OVERRIDE (IP): Performed by: EMERGENCY MEDICINE

## 2019-02-01 PROCEDURE — 83690 ASSAY OF LIPASE: CPT

## 2019-02-01 PROCEDURE — 700117 HCHG RX CONTRAST REV CODE 255: Performed by: EMERGENCY MEDICINE

## 2019-02-01 PROCEDURE — 96374 THER/PROPH/DIAG INJ IV PUSH: CPT | Mod: XU

## 2019-02-01 RX ORDER — SODIUM CHLORIDE AND POTASSIUM CHLORIDE 150; 900 MG/100ML; MG/100ML
INJECTION, SOLUTION INTRAVENOUS CONTINUOUS
Status: DISCONTINUED | OUTPATIENT
Start: 2019-02-01 | End: 2019-02-01 | Stop reason: HOSPADM

## 2019-02-01 RX ORDER — ONDANSETRON 2 MG/ML
4 INJECTION INTRAMUSCULAR; INTRAVENOUS EVERY 4 HOURS PRN
Status: DISCONTINUED | OUTPATIENT
Start: 2019-02-01 | End: 2019-02-01 | Stop reason: HOSPADM

## 2019-02-01 RX ORDER — PROMETHAZINE HYDROCHLORIDE 25 MG/1
12.5-25 SUPPOSITORY RECTAL EVERY 4 HOURS PRN
Status: DISCONTINUED | OUTPATIENT
Start: 2019-02-01 | End: 2019-02-01 | Stop reason: HOSPADM

## 2019-02-01 RX ORDER — METOPROLOL SUCCINATE 50 MG/1
50 TABLET, EXTENDED RELEASE ORAL DAILY
COMMUNITY
End: 2019-02-03

## 2019-02-01 RX ORDER — MORPHINE SULFATE 4 MG/ML
4 INJECTION, SOLUTION INTRAMUSCULAR; INTRAVENOUS ONCE
Status: COMPLETED | OUTPATIENT
Start: 2019-02-01 | End: 2019-02-01

## 2019-02-01 RX ORDER — ONDANSETRON 2 MG/ML
4 INJECTION INTRAMUSCULAR; INTRAVENOUS ONCE
Status: COMPLETED | OUTPATIENT
Start: 2019-02-01 | End: 2019-02-01

## 2019-02-01 RX ORDER — BISACODYL 10 MG
10 SUPPOSITORY, RECTAL RECTAL
Status: DISCONTINUED | OUTPATIENT
Start: 2019-02-01 | End: 2019-02-01 | Stop reason: HOSPADM

## 2019-02-01 RX ORDER — ONDANSETRON 4 MG/1
4 TABLET, ORALLY DISINTEGRATING ORAL EVERY 4 HOURS PRN
Status: DISCONTINUED | OUTPATIENT
Start: 2019-02-01 | End: 2019-02-01 | Stop reason: HOSPADM

## 2019-02-01 RX ORDER — IBUPROFEN 200 MG
800 TABLET ORAL
COMMUNITY
End: 2019-02-03

## 2019-02-01 RX ORDER — DICYCLOMINE HYDROCHLORIDE 10 MG/1
10 CAPSULE ORAL
Qty: 120 CAP | Refills: 0 | Status: SHIPPED
Start: 2019-02-01 | End: 2019-02-03

## 2019-02-01 RX ORDER — AMOXICILLIN 250 MG
2 CAPSULE ORAL 2 TIMES DAILY
Status: DISCONTINUED | OUTPATIENT
Start: 2019-02-01 | End: 2019-02-01 | Stop reason: HOSPADM

## 2019-02-01 RX ORDER — PROMETHAZINE HYDROCHLORIDE 25 MG/1
12.5-25 TABLET ORAL EVERY 4 HOURS PRN
Status: DISCONTINUED | OUTPATIENT
Start: 2019-02-01 | End: 2019-02-01 | Stop reason: HOSPADM

## 2019-02-01 RX ORDER — MORPHINE SULFATE 4 MG/ML
1-4 INJECTION, SOLUTION INTRAMUSCULAR; INTRAVENOUS EVERY 4 HOURS PRN
Status: DISCONTINUED | OUTPATIENT
Start: 2019-02-01 | End: 2019-02-01 | Stop reason: HOSPADM

## 2019-02-01 RX ORDER — ONDANSETRON 4 MG/1
4 TABLET, ORALLY DISINTEGRATING ORAL ONCE
Qty: 10 TAB | Refills: 0 | Status: SHIPPED
Start: 2019-02-01 | End: 2019-02-01

## 2019-02-01 RX ORDER — POLYETHYLENE GLYCOL 3350 17 G/17G
1 POWDER, FOR SOLUTION ORAL
Status: DISCONTINUED | OUTPATIENT
Start: 2019-02-01 | End: 2019-02-01 | Stop reason: HOSPADM

## 2019-02-01 RX ADMIN — ONDANSETRON 4 MG: 2 INJECTION INTRAMUSCULAR; INTRAVENOUS at 12:10

## 2019-02-01 RX ADMIN — FENTANYL CITRATE 100 MCG: 50 INJECTION, SOLUTION INTRAMUSCULAR; INTRAVENOUS at 13:56

## 2019-02-01 RX ADMIN — IOHEXOL 100 ML: 350 INJECTION, SOLUTION INTRAVENOUS at 12:49

## 2019-02-01 RX ADMIN — MORPHINE SULFATE 4 MG: 4 INJECTION INTRAVENOUS at 12:10

## 2019-02-01 ASSESSMENT — ENCOUNTER SYMPTOMS
FEVER: 0
WEIGHT LOSS: 1
ABDOMINAL PAIN: 1
INSOMNIA: 1
VOMITING: 1
NERVOUS/ANXIOUS: 1

## 2019-02-01 ASSESSMENT — LIFESTYLE VARIABLES: DO YOU DRINK ALCOHOL: NO

## 2019-02-01 NOTE — PROGRESS NOTES
Arabella Anglin patient has chosen to leave the hospital against medical advice. The attending physician has not discharged the patient. Patient is not a risk to himself or others. I have discussed with the patient the following:  Risks and consequences of leaving the hospital too soon.      Discharge against medical advice form has been Signed.        Attending physician has been notified.

## 2019-02-01 NOTE — ED NOTES
PT calm and cooperative at this time; requesting further explanation for admission; all questions answered for pt. Pt denies further needs at this time.

## 2019-02-01 NOTE — ED PROVIDER NOTES
ED Provider Note    CHIEF COMPLAINT  Chief Complaint   Patient presents with   • Abdominal Pain     left upper abd onset 0700       HPI  Arabella Anglin is a 53 y.o. female here for evaluation of abdominal pain.  The patient states that she has had this ongoing pain over the last couple of days, and it is increasing in severity.  She states it is in the stomach area, but does not radiate to her back or lower abdomen.  She has no chest pain and no shortness of breath currently.  Patient states that she has not taken anything for the pain, but does admit to smoking marijuana.  She does have history of cyclic vomiting.  She states nothing alleviates or exacerbates her symptoms, and nothing changes the symptoms.  She describes as a sharp pain, that is intermittent.    PAST MEDICAL HISTORY   has a past medical history of Anxiety; Chronic back pain; and PVC (premature ventricular contraction).    SOCIAL HISTORY  Social History     Social History Main Topics   • Smoking status: Current Every Day Smoker     Packs/day: 0.50   • Smokeless tobacco: Never Used   • Alcohol use No   • Drug use: Yes     Types: Inhaled      Comment: marijuana   • Sexual activity: Not on file       Family History  No bleeding disorders    SURGICAL HISTORY  patient denies any surgical history    CURRENT MEDICATIONS  Home Medications     Reviewed by Justina Devries R.N. (Registered Nurse) on 02/01/19 at 1119  Med List Status: Unable to Obtain   Medication Last Dose Status   amoxicillin (AMOXIL) 875 MG tablet  Active   DULoxetine (CYMBALTA) 60 MG Cap DR Particles delayed-release capsule  Active   lisinopril (PRINIVIL) 10 MG Tab  Active   Omeprazole (PRILOSEC PO)  Active   potassium chloride (KAYCIEL) 20 MEQ/15ML (10%) Solution  Active   sucralfate (CARAFATE) 1 GM Tab  Active   traZODone (DESYREL) 150 MG Tab 1/31/2019 Active   valacyclovir (VALTREX) 1 GM Tab  Active                ALLERGIES  No Known Allergies    REVIEW OF SYSTEMS  See HPI for  further details. Review of systems as above, otherwise all other systems are negative.     PHYSICAL EXAM  Constitutional: Well developed, well nourished.  Moderate acute distress.  HEENT: Normocephalic, atraumatic. Posterior pharynx clear and moist.  Eyes:  EOMI. Normal sclera.  Neck: Supple, Full range of motion, nontender.  Chest/Pulmonary: clear to ausculation. Symmetrical expansion.   Cardio: Regular rate and rhythm with no murmur.   Abdomen: Soft, diffuse tenderness, no peritoneal signs. No guarding. No palpable masses.  Musculoskeletal: No deformity, no edema, neurovascular intact.   Neuro: Clear speech, appropriate, cooperative, cranial nerves II-XII grossly intact.  Psych: Anxious    Results for orders placed or performed during the hospital encounter of 02/01/19   CBC WITH DIFFERENTIAL   Result Value Ref Range    WBC 8.3 4.8 - 10.8 K/uL    RBC 4.40 4.20 - 5.40 M/uL    Hemoglobin 14.8 12.0 - 16.0 g/dL    Hematocrit 43.1 37.0 - 47.0 %    MCV 98.0 (H) 81.4 - 97.8 fL    MCH 33.6 (H) 27.0 - 33.0 pg    MCHC 34.3 33.6 - 35.0 g/dL    RDW 44.1 35.9 - 50.0 fL    Platelet Count 150 (L) 164 - 446 K/uL    MPV 10.9 9.0 - 12.9 fL    Neutrophils-Polys 81.20 (H) 44.00 - 72.00 %    Lymphocytes 14.40 (L) 22.00 - 41.00 %    Monocytes 3.60 0.00 - 13.40 %    Eosinophils 0.10 0.00 - 6.90 %    Basophils 0.50 0.00 - 1.80 %    Immature Granulocytes 0.20 0.00 - 0.90 %    Nucleated RBC 0.00 /100 WBC    Neutrophils (Absolute) 6.76 2.00 - 7.15 K/uL    Lymphs (Absolute) 1.20 1.00 - 4.80 K/uL    Monos (Absolute) 0.30 0.00 - 0.85 K/uL    Eos (Absolute) 0.01 0.00 - 0.51 K/uL    Baso (Absolute) 0.04 0.00 - 0.12 K/uL    Immature Granulocytes (abs) 0.02 0.00 - 0.11 K/uL    NRBC (Absolute) 0.00 K/uL   COMP METABOLIC PANEL   Result Value Ref Range    Sodium 139 135 - 145 mmol/L    Potassium 3.4 (L) 3.6 - 5.5 mmol/L    Chloride 106 96 - 112 mmol/L    Co2 22 20 - 33 mmol/L    Anion Gap 11.0 0.0 - 11.9    Glucose 132 (H) 65 - 99 mg/dL    Bun 19 8 -  22 mg/dL    Creatinine 0.89 0.50 - 1.40 mg/dL    Calcium 9.4 8.5 - 10.5 mg/dL    AST(SGOT) 18 12 - 45 U/L    ALT(SGPT) 12 2 - 50 U/L    Alkaline Phosphatase 41 30 - 99 U/L    Total Bilirubin 0.6 0.1 - 1.5 mg/dL    Albumin 4.5 3.2 - 4.9 g/dL    Total Protein 7.0 6.0 - 8.2 g/dL    Globulin 2.5 1.9 - 3.5 g/dL    A-G Ratio 1.8 g/dL   LIPASE   Result Value Ref Range    Lipase 75 11 - 82 U/L   URINALYSIS,CULTURE IF INDICATED   Result Value Ref Range    Color Yellow     Character Turbid (A)     Specific Gravity 1.020 <1.035    Ph 7.5 5.0 - 8.0    Glucose Negative Negative mg/dL    Ketones Trace (A) Negative mg/dL    Protein Negative Negative mg/dL    Bilirubin Negative Negative    Urobilinogen, Urine 0.2 Negative    Nitrite Negative Negative    Leukocyte Esterase Negative Negative    Occult Blood Negative Negative    Micro Urine Req Microscopic    ESTIMATED GFR   Result Value Ref Range    GFR If African American >60 >60 mL/min/1.73 m 2    GFR If Non African American >60 >60 mL/min/1.73 m 2   BETA-HCG QUALITATIVE SERUM   Result Value Ref Range    Beta-Hcg Qualitative Serum Negative Negative   URINE MICROSCOPIC (W/UA)   Result Value Ref Range    WBC 0-2 /hpf    RBC 2-5 (A) /hpf    Bacteria Negative None /hpf    Epithelial Cells Negative /hpf    Hyaline Cast 0-2 /lpf   EKG   Result Value Ref Range    Report       [facility] Emergency Dept.    Test Date:  2019  Pt Name:    NOAH GORDON                 Department: ER  MRN:        [id number]                      Room:       [facility]  Gender:     Female                       Technician: [id number]  :        1965                   Requested By:ER TRIAGE PROTOCOL  Order #:    [id number]                    Reading MD:    Measurements  Intervals                                Axis  Rate:       47                           P:          75  TX:         152                          QRS:        53  QRSD:       90                           T:          35  QT:          508  QTc:        450    Interpretive Statements  SINUS BRADYCARDIA  Compared to ECG 01/24/2019 14:28:32  Atrial abnormality no longer present       CT-ABDOMEN-PELVIS WITH   Final Result      No acute intra-abdominal abnormality is seen.      Mild atherosclerotic plaque.            PROCEDURES     MEDICAL RECORD  I have reviewed patient's medical record and pertinent results are listed above.    COURSE & MEDICAL DECISION MAKING  I have reviewed any medical record information, laboratory studies and radiographic results as noted above.    Dr. Grimes will admit the pt.  She has had continued vomiting and pain, despite medications here.     FINAL IMPRESSION  Intractable abdominal pain  Cyclic vomiting       Electronically signed by: Aris Guerra, 2/1/2019 12:39 PM

## 2019-02-01 NOTE — ED NOTES
"PT screaming out \"fuck you! Give me some water!\" and drinking water prior to MD approval.    Pt medicated per MAR for pain.  "

## 2019-02-01 NOTE — ED NOTES
Chief Complaint   Patient presents with   • Abdominal Pain     left upper abd onset 0700     Pt bib ems with above complaints. Pt said that she had similar problem before and was given Morphine&Ativan which relieved pain.   Pt admits to smoking marijuana.   Ed tech at bedside for ekg  Protocol initiated.  Pt was given Fentanyl 100mcg and zofran pta

## 2019-02-01 NOTE — H&P
"Hospital Medicine History & Physical Note    Date of Service  2019    Primary Care Physician  Pcp Pt States None        Code Status  full    Chief Complaint  Abdominal pain    History of Presenting Illness  53 y.o. female who presented 2019 with abdominal pain and vomiting.  Ms. Anglin has a past medical history of anxiety and daily marijuana use that states \"my stomach hurts so bad\" she states she is feeling crampy in the abdomen and has been vomiting this morning and now dry heaves.  Her last bowel movement was normal this morning.  This happened to him once before in [facility] about 3-4 months ago the results.  She attributes this constellation of symptoms to her the severe anxiety that she has has is requesting IV Ativan.  In the emergency room, a CAT scan is negative.  She is initially quite bradycardic though she takes metoprolol for her PVCs.  In the emergency room, we discussed that her marijuana use may be contributing her symptoms and perhaps would be appropriate to taper off of it.    Review of Systems  Review of Systems   Constitutional: Positive for weight loss. Negative for fever.   Gastrointestinal: Positive for abdominal pain and vomiting.   Psychiatric/Behavioral: The patient is nervous/anxious and has insomnia.    All other systems reviewed and are negative.      Past Medical History   has a past medical history of Anxiety; Chronic back pain; and PVC (premature ventricular contraction).  Fibromyalgia    Surgical History  Radioablation therapy in the back    Family History  Dad  of heart attack mother  in a motor vehicle accident    Social History   reports that she has been smoking.  She has been smoking about 0.50 packs per day. She has never used smokeless tobacco. She reports that she uses drugs, including Inhaled. She reports that she does not drink alcohol.  She uses marijuana.  In a single wide with 6 other people and 4 dogs    Allergies  No Known " Allergies    Medications  Prior to Admission Medications   Prescriptions Last Dose Informant Patient Reported? Taking?   DULoxetine (CYMBALTA) 60 MG Cap DR Particles delayed-release capsule 1/31/2019 at AM Patient Yes No   Sig: Take 60 mg by mouth every day.   ibuprofen (MOTRIN) 200 MG Tab UNK at PRN Patient Yes Yes   Sig: Take 800 mg by mouth 1 time daily as needed for Mild Pain.   metoprolol SR (TOPROL XL) 50 MG TABLET SR 24 HR 1/31/2019 at AM Patient Yes Yes   Sig: Take 50 mg by mouth every day.   traZODone (DESYREL) 100 MG Tab 1/31/2019 at PM Patient Yes No   Sig: Take 100 mg by mouth every evening.   valacyclovir (VALTREX) 1 GM Tab UNK at PRN Patient Yes No   Sig: Take 1,000 mg by mouth Once PRN (Breakouts).      Facility-Administered Medications: None       Physical Exam  Temp:  [37 °C (98.6 °F)] 37 °C (98.6 °F)  Pulse:  [47-59] 47  Resp:  [14-23] 14  BP: (165)/(60) 165/60  SpO2:  [99 %-100 %] 99 %    Physical Exam   Constitutional: She is oriented to person, place, and time. She appears distressed.   HENT:   Head: Normocephalic and atraumatic.   Dry mucous membranes   Neck: Normal range of motion. Neck supple.   Cardiovascular:   Sinus bradycardia   Pulmonary/Chest: No respiratory distress.   Tachypnea   Abdominal: Soft. She exhibits no distension.   Mild diffuse tenderness without rebound   Musculoskeletal: She exhibits no edema or tenderness.   Neurological: She is alert and oriented to person, place, and time.   Skin: She is diaphoretic.   Psychiatric:   Very anxious   Nursing note and vitals reviewed.      Laboratory:  Recent Labs      02/01/19   1118   WBC  8.3   RBC  4.40   HEMOGLOBIN  14.8   HEMATOCRIT  43.1   MCV  98.0*   MCH  33.6*   MCHC  34.3   RDW  44.1   PLATELETCT  150*   MPV  10.9     Recent Labs      02/01/19   1118   SODIUM  139   POTASSIUM  3.4*   CHLORIDE  106   CO2  22   GLUCOSE  132*   BUN  19   CREATININE  0.89   CALCIUM  9.4     Recent Labs      02/01/19   1118   ALTSGPT  12   ASTSGOT   18   ALKPHOSPHAT  41   TBILIRUBIN  0.6   LIPASE  75   GLUCOSE  132*                 No results for input(s): TROPONINI in the last 72 hours.    Urinalysis:    Recent Labs      02/01/19   1152   SPECGRAVITY  1.020   GLUCOSEUR  Negative   KETONES  Trace*   NITRITE  Negative   LEUKESTERAS  Negative   WBCURINE  0-2   RBCURINE  2-5*   BACTERIA  Negative   EPITHELCELL  Negative        Imaging:  CT-ABDOMEN-PELVIS WITH   Final Result      No acute intra-abdominal abnormality is seen.      Mild atherosclerotic plaque.            Assessment/Plan:  I anticipate this patient is appropriate for observation status at this time.    * Generalized abdominal pain- (present on admission)   Assessment & Plan    Associated with severe cramping and vomiting.  This may have been exacerbated by her excess marijuana use which we discussed cessation  IV fluids, IV antiemetics, and IV morphine been ordered.    CT abd/pelvis:    No acute intra-abdominal abnormality is seen.    Mild atherosclerotic plaque     PVC's (premature ventricular contractions)- (present on admission)   Assessment & Plan    She takes metoprolol for this     Fibromyalgia- (present on admission)   Assessment & Plan    This is a chronic condition for her     Weight loss- (present on admission)   Assessment & Plan    She had a 25 pound weight loss in the past month it has been nonpurposeful in nature which she attributes to her significant stress     Anxiety- (present on admission)   Assessment & Plan    Patient states that she has significant anxiety and panic attacks though she is not on any medications for this  Is requesting Ativan we discussed we are already going to give IV narcotics for her pain medication and we cannot mix these 2 medications     Tobacco use- (present on admission)   Assessment & Plan    Cessation was discussed and  He is trying to taper         VTE prophylaxis: lovenox

## 2019-02-01 NOTE — DISCHARGE PLANNING
Care Transition Team Assessment    Patient is new to the area, currently living with her daughter.  Spouse at bedside.  His name is Chance Anglin ([phone]).  Scripts can be called into [facility] on [address].  No needs anticipated at this time.          Information Source  Orientation : Oriented x 4  Information Given By: Patient  Informant's Name: Arabella Anglin  Who is responsible for making decisions for patient? : Patient    Elopement Risk  Legal Hold: No  Ambulatory or Self Mobile in Wheelchair: No-Not an Elopement Risk    Interdisciplinary Discharge Planning  Does Admitting Nurse Feel This Could be a Complex Discharge?: No  Primary Care Physician:  (Patient states none)  Lives with - Patient's Self Care Capacity: Spouse, Adult Children  Support Systems: Spouse / Significant Other, Children  Able to Return to Previous ADL's: Yes  Mobility Issues: No  Patient Expects to be Discharged to::  (Home)  Assistance Needed: No    Discharge Preparedness  What is your plan after discharge?: Home with help  What are your discharge supports?: Spouse, Child  Prior Functional Level: Ambulatory  Difficulity with ADLs: None  Difficulity with IADLs: None    Functional Assesment  Prior Functional Level: Ambulatory    Finances  Financial Barriers to Discharge: No  Prescription Coverage: Yes     Advance Directive  Advance Directive?: None  Advance Directive offered?: AD Booklet refused    Domestic Abuse  Have you ever been the victim of abuse or violence?: No    Discharge Risks or Barriers  Discharge risks or barriers?: No PCP, Homeless / couch surfing  Patient risk factors: No PCP    Anticipated Discharge Information  Anticipated discharge disposition: Home  Discharge Address: [id number] [place] [address]  Discharge Contact Phone Number: [phone]

## 2019-02-01 NOTE — ED NOTES
PT screaming loudly in pain and stating she is unable to sit still due to pain; ERP aware and has assessed pt.

## 2019-02-01 NOTE — ED NOTES
Med Rec Updated and Complete per Pt at bedside  Allergies Reviewed  No PO ABX last 30 days    Pt reports taking Valtrex only as needed for breakouts.  Pt also reports she is not taking Lisinopril or Sucralfate at this time.

## 2019-02-01 NOTE — PROGRESS NOTES
"Dr. Sadler notified patient left AMA on arrival to unit. IV discontinued. Provider stated \"ok.\"  "

## 2019-02-02 NOTE — ASSESSMENT & PLAN NOTE
Patient states that she has significant anxiety and panic attacks though she is not on any medications for this  Is requesting Ativan we discussed we are already going to give IV narcotics for her pain medication and we cannot mix these 2 medications

## 2019-02-02 NOTE — ASSESSMENT & PLAN NOTE
Associated with severe cramping and vomiting.  This may have been exacerbated by her excess marijuana use which we discussed cessation  IV fluids, IV antiemetics, and IV morphine been ordered.    CT abd/pelvis:    No acute intra-abdominal abnormality is seen.    Mild atherosclerotic plaque

## 2019-02-02 NOTE — ASSESSMENT & PLAN NOTE
She had a 25 pound weight loss in the past month it has been nonpurposeful in nature which she attributes to her significant stress

## 2019-02-03 ENCOUNTER — HOSPITAL ENCOUNTER (OUTPATIENT)
Facility: MEDICAL CENTER | Age: 54
End: 2019-02-04
Attending: EMERGENCY MEDICINE | Admitting: HOSPITALIST
Payer: MEDICARE

## 2019-02-03 ENCOUNTER — APPOINTMENT (OUTPATIENT)
Dept: RADIOLOGY | Facility: MEDICAL CENTER | Age: 54
End: 2019-02-03
Attending: EMERGENCY MEDICINE
Payer: MEDICARE

## 2019-02-03 DIAGNOSIS — R10.9 ABDOMINAL PAIN, UNSPECIFIED ABDOMINAL LOCATION: ICD-10-CM

## 2019-02-03 DIAGNOSIS — F12.10 MARIJUANA ABUSE: ICD-10-CM

## 2019-02-03 DIAGNOSIS — R11.15 INTRACTABLE CYCLICAL VOMITING WITH NAUSEA: ICD-10-CM

## 2019-02-03 PROBLEM — F12.90 CANNABINOID HYPEREMESIS SYNDROME: Status: ACTIVE | Noted: 2019-02-03

## 2019-02-03 PROBLEM — R11.2 CANNABINOID HYPEREMESIS SYNDROME: Status: ACTIVE | Noted: 2019-02-03

## 2019-02-03 LAB
ALBUMIN SERPL BCP-MCNC: 4.7 G/DL (ref 3.2–4.9)
ALBUMIN/GLOB SERPL: 2 G/DL
ALP SERPL-CCNC: 38 U/L (ref 30–99)
ALT SERPL-CCNC: 14 U/L (ref 2–50)
AMPHET UR QL SCN: NEGATIVE
ANION GAP SERPL CALC-SCNC: 13 MMOL/L (ref 0–11.9)
APPEARANCE UR: CLEAR
AST SERPL-CCNC: 22 U/L (ref 12–45)
BACTERIA #/AREA URNS HPF: NEGATIVE /HPF
BARBITURATES UR QL SCN: NEGATIVE
BASOPHILS # BLD AUTO: 0.5 % (ref 0–1.8)
BASOPHILS # BLD: 0.05 K/UL (ref 0–0.12)
BENZODIAZ UR QL SCN: NEGATIVE
BILIRUB SERPL-MCNC: 0.4 MG/DL (ref 0.1–1.5)
BILIRUB UR QL STRIP.AUTO: NEGATIVE
BUN SERPL-MCNC: 20 MG/DL (ref 8–22)
BZE UR QL SCN: NEGATIVE
CALCIUM SERPL-MCNC: 9.4 MG/DL (ref 8.5–10.5)
CANNABINOIDS UR QL SCN: POSITIVE
CHLORIDE SERPL-SCNC: 103 MMOL/L (ref 96–112)
CO2 SERPL-SCNC: 22 MMOL/L (ref 20–33)
COLOR UR: YELLOW
CREAT SERPL-MCNC: 0.82 MG/DL (ref 0.5–1.4)
EOSINOPHIL # BLD AUTO: 0.04 K/UL (ref 0–0.51)
EOSINOPHIL NFR BLD: 0.4 % (ref 0–6.9)
EPI CELLS #/AREA URNS HPF: NEGATIVE /HPF
ERYTHROCYTE [DISTWIDTH] IN BLOOD BY AUTOMATED COUNT: 43.5 FL (ref 35.9–50)
ETHANOL BLD-MCNC: 0 G/DL
GLOBULIN SER CALC-MCNC: 2.4 G/DL (ref 1.9–3.5)
GLUCOSE SERPL-MCNC: 119 MG/DL (ref 65–99)
GLUCOSE UR STRIP.AUTO-MCNC: NEGATIVE MG/DL
HCT VFR BLD AUTO: 41.4 % (ref 37–47)
HGB BLD-MCNC: 14.4 G/DL (ref 12–16)
HYALINE CASTS #/AREA URNS LPF: NORMAL /LPF
IMM GRANULOCYTES # BLD AUTO: 0.05 K/UL (ref 0–0.11)
IMM GRANULOCYTES NFR BLD AUTO: 0.5 % (ref 0–0.9)
KETONES UR STRIP.AUTO-MCNC: NEGATIVE MG/DL
LEUKOCYTE ESTERASE UR QL STRIP.AUTO: NEGATIVE
LIPASE SERPL-CCNC: 27 U/L (ref 11–82)
LYMPHOCYTES # BLD AUTO: 1.35 K/UL (ref 1–4.8)
LYMPHOCYTES NFR BLD: 12.5 % (ref 22–41)
MCH RBC QN AUTO: 34 PG (ref 27–33)
MCHC RBC AUTO-ENTMCNC: 34.8 G/DL (ref 33.6–35)
MCV RBC AUTO: 97.9 FL (ref 81.4–97.8)
METHADONE UR QL SCN: NEGATIVE
MICRO URNS: ABNORMAL
MONOCYTES # BLD AUTO: 0.4 K/UL (ref 0–0.85)
MONOCYTES NFR BLD AUTO: 3.7 % (ref 0–13.4)
NEUTROPHILS # BLD AUTO: 8.88 K/UL (ref 2–7.15)
NEUTROPHILS NFR BLD: 82.4 % (ref 44–72)
NITRITE UR QL STRIP.AUTO: NEGATIVE
NRBC # BLD AUTO: 0 K/UL
NRBC BLD-RTO: 0 /100 WBC
OPIATES UR QL SCN: NEGATIVE
OXYCODONE UR QL SCN: NEGATIVE
PCP UR QL SCN: NEGATIVE
PH UR STRIP.AUTO: 6 [PH]
PLATELET # BLD AUTO: 149 K/UL (ref 164–446)
PMV BLD AUTO: 10.9 FL (ref 9–12.9)
POTASSIUM SERPL-SCNC: 3.4 MMOL/L (ref 3.6–5.5)
PROPOXYPH UR QL SCN: NEGATIVE
PROT SERPL-MCNC: 7.1 G/DL (ref 6–8.2)
PROT UR QL STRIP: NEGATIVE MG/DL
RBC # BLD AUTO: 4.23 M/UL (ref 4.2–5.4)
RBC # URNS HPF: NORMAL /HPF
RBC UR QL AUTO: ABNORMAL
SODIUM SERPL-SCNC: 138 MMOL/L (ref 135–145)
SP GR UR STRIP.AUTO: 1.01
UROBILINOGEN UR STRIP.AUTO-MCNC: 0.2 MG/DL
WBC # BLD AUTO: 10.8 K/UL (ref 4.8–10.8)
WBC #/AREA URNS HPF: NORMAL /HPF

## 2019-02-03 PROCEDURE — 96376 TX/PRO/DX INJ SAME DRUG ADON: CPT

## 2019-02-03 PROCEDURE — G0378 HOSPITAL OBSERVATION PER HR: HCPCS

## 2019-02-03 PROCEDURE — 96375 TX/PRO/DX INJ NEW DRUG ADDON: CPT

## 2019-02-03 PROCEDURE — 99285 EMERGENCY DEPT VISIT HI MDM: CPT

## 2019-02-03 PROCEDURE — 304561 HCHG STAT O2

## 2019-02-03 PROCEDURE — 81001 URINALYSIS AUTO W/SCOPE: CPT

## 2019-02-03 PROCEDURE — 700105 HCHG RX REV CODE 258: Performed by: EMERGENCY MEDICINE

## 2019-02-03 PROCEDURE — 70450 CT HEAD/BRAIN W/O DYE: CPT

## 2019-02-03 PROCEDURE — 85025 COMPLETE CBC W/AUTO DIFF WBC: CPT

## 2019-02-03 PROCEDURE — 99218 PR INITIAL OBSERVATION CARE,LEVL I: CPT | Performed by: HOSPITALIST

## 2019-02-03 PROCEDURE — 80053 COMPREHEN METABOLIC PANEL: CPT

## 2019-02-03 PROCEDURE — A9270 NON-COVERED ITEM OR SERVICE: HCPCS | Performed by: HOSPITALIST

## 2019-02-03 PROCEDURE — 83690 ASSAY OF LIPASE: CPT

## 2019-02-03 PROCEDURE — 700111 HCHG RX REV CODE 636 W/ 250 OVERRIDE (IP): Performed by: EMERGENCY MEDICINE

## 2019-02-03 PROCEDURE — 700102 HCHG RX REV CODE 250 W/ 637 OVERRIDE(OP): Performed by: HOSPITALIST

## 2019-02-03 PROCEDURE — 700111 HCHG RX REV CODE 636 W/ 250 OVERRIDE (IP): Performed by: HOSPITALIST

## 2019-02-03 PROCEDURE — 36415 COLL VENOUS BLD VENIPUNCTURE: CPT

## 2019-02-03 PROCEDURE — 96374 THER/PROPH/DIAG INJ IV PUSH: CPT

## 2019-02-03 PROCEDURE — 80307 DRUG TEST PRSMV CHEM ANLYZR: CPT

## 2019-02-03 PROCEDURE — 700101 HCHG RX REV CODE 250: Performed by: HOSPITALIST

## 2019-02-03 RX ORDER — SODIUM CHLORIDE AND POTASSIUM CHLORIDE 150; 900 MG/100ML; MG/100ML
INJECTION, SOLUTION INTRAVENOUS CONTINUOUS
Status: DISCONTINUED | OUTPATIENT
Start: 2019-02-03 | End: 2019-02-04 | Stop reason: HOSPADM

## 2019-02-03 RX ORDER — ONDANSETRON 4 MG/1
4 TABLET, ORALLY DISINTEGRATING ORAL EVERY 8 HOURS PRN
Qty: 10 TAB | Refills: 0 | Status: SHIPPED | OUTPATIENT
Start: 2019-02-03 | End: 2019-02-15 | Stop reason: SDUPTHER

## 2019-02-03 RX ORDER — TRAZODONE HYDROCHLORIDE 150 MG/1
150 TABLET ORAL NIGHTLY
COMMUNITY
End: 2019-02-15 | Stop reason: SDUPTHER

## 2019-02-03 RX ORDER — SODIUM CHLORIDE 9 MG/ML
1000 INJECTION, SOLUTION INTRAVENOUS ONCE
Status: COMPLETED | OUTPATIENT
Start: 2019-02-03 | End: 2019-02-03

## 2019-02-03 RX ORDER — ACETAMINOPHEN 325 MG/1
650 TABLET ORAL EVERY 6 HOURS PRN
Status: DISCONTINUED | OUTPATIENT
Start: 2019-02-03 | End: 2019-02-04 | Stop reason: HOSPADM

## 2019-02-03 RX ORDER — PROMETHAZINE HYDROCHLORIDE 25 MG/1
12.5-25 TABLET ORAL EVERY 4 HOURS PRN
Status: DISCONTINUED | OUTPATIENT
Start: 2019-02-03 | End: 2019-02-04 | Stop reason: HOSPADM

## 2019-02-03 RX ORDER — ONDANSETRON 4 MG/1
4 TABLET, ORALLY DISINTEGRATING ORAL EVERY 4 HOURS PRN
Status: DISCONTINUED | OUTPATIENT
Start: 2019-02-03 | End: 2019-02-04 | Stop reason: HOSPADM

## 2019-02-03 RX ORDER — CAPSAICIN 0.025 %
CREAM (GRAM) TOPICAL EVERY 4 HOURS PRN
Status: DISCONTINUED | OUTPATIENT
Start: 2019-02-03 | End: 2019-02-04 | Stop reason: HOSPADM

## 2019-02-03 RX ORDER — HALOPERIDOL 5 MG/ML
5 INJECTION INTRAMUSCULAR ONCE
Status: COMPLETED | OUTPATIENT
Start: 2019-02-03 | End: 2019-02-03

## 2019-02-03 RX ORDER — METOPROLOL SUCCINATE 100 MG/1
150 TABLET, EXTENDED RELEASE ORAL DAILY
COMMUNITY
End: 2019-02-15 | Stop reason: SDUPTHER

## 2019-02-03 RX ORDER — ONDANSETRON 2 MG/ML
4 INJECTION INTRAMUSCULAR; INTRAVENOUS ONCE
Status: COMPLETED | OUTPATIENT
Start: 2019-02-03 | End: 2019-02-03

## 2019-02-03 RX ORDER — BISACODYL 10 MG
10 SUPPOSITORY, RECTAL RECTAL
Status: DISCONTINUED | OUTPATIENT
Start: 2019-02-03 | End: 2019-02-04 | Stop reason: HOSPADM

## 2019-02-03 RX ORDER — ONDANSETRON 2 MG/ML
4 INJECTION INTRAMUSCULAR; INTRAVENOUS EVERY 4 HOURS PRN
Status: DISCONTINUED | OUTPATIENT
Start: 2019-02-03 | End: 2019-02-04 | Stop reason: HOSPADM

## 2019-02-03 RX ORDER — TRAZODONE HYDROCHLORIDE 50 MG/1
100 TABLET ORAL NIGHTLY
Status: DISCONTINUED | OUTPATIENT
Start: 2019-02-03 | End: 2019-02-04 | Stop reason: HOSPADM

## 2019-02-03 RX ORDER — PROMETHAZINE HYDROCHLORIDE 25 MG/1
12.5-25 SUPPOSITORY RECTAL EVERY 4 HOURS PRN
Status: DISCONTINUED | OUTPATIENT
Start: 2019-02-03 | End: 2019-02-04 | Stop reason: HOSPADM

## 2019-02-03 RX ORDER — HALOPERIDOL 5 MG/ML
2-5 INJECTION INTRAMUSCULAR EVERY 4 HOURS PRN
Status: DISCONTINUED | OUTPATIENT
Start: 2019-02-03 | End: 2019-02-04 | Stop reason: HOSPADM

## 2019-02-03 RX ORDER — DIPHENHYDRAMINE HYDROCHLORIDE 50 MG/ML
25 INJECTION INTRAMUSCULAR; INTRAVENOUS ONCE
Status: COMPLETED | OUTPATIENT
Start: 2019-02-03 | End: 2019-02-03

## 2019-02-03 RX ORDER — DIPHENHYDRAMINE HYDROCHLORIDE 50 MG/ML
25 INJECTION INTRAMUSCULAR; INTRAVENOUS EVERY 6 HOURS PRN
Status: DISCONTINUED | OUTPATIENT
Start: 2019-02-03 | End: 2019-02-04 | Stop reason: HOSPADM

## 2019-02-03 RX ORDER — NICOTINE 21 MG/24HR
21 PATCH, TRANSDERMAL 24 HOURS TRANSDERMAL
Status: DISCONTINUED | OUTPATIENT
Start: 2019-02-03 | End: 2019-02-04 | Stop reason: HOSPADM

## 2019-02-03 RX ORDER — AMOXICILLIN 250 MG
2 CAPSULE ORAL 2 TIMES DAILY
Status: DISCONTINUED | OUTPATIENT
Start: 2019-02-03 | End: 2019-02-04 | Stop reason: HOSPADM

## 2019-02-03 RX ORDER — POLYETHYLENE GLYCOL 3350 17 G/17G
1 POWDER, FOR SOLUTION ORAL
Status: DISCONTINUED | OUTPATIENT
Start: 2019-02-03 | End: 2019-02-04 | Stop reason: HOSPADM

## 2019-02-03 RX ORDER — LORAZEPAM 2 MG/ML
0.5 INJECTION INTRAMUSCULAR ONCE
Status: COMPLETED | OUTPATIENT
Start: 2019-02-03 | End: 2019-02-03

## 2019-02-03 RX ORDER — DULOXETIN HYDROCHLORIDE 60 MG/1
60 CAPSULE, DELAYED RELEASE ORAL DAILY
Status: DISCONTINUED | OUTPATIENT
Start: 2019-02-03 | End: 2019-02-04 | Stop reason: HOSPADM

## 2019-02-03 RX ADMIN — ONDANSETRON 4 MG: 2 INJECTION INTRAMUSCULAR; INTRAVENOUS at 10:35

## 2019-02-03 RX ADMIN — POTASSIUM CHLORIDE AND SODIUM CHLORIDE: 900; 150 INJECTION, SOLUTION INTRAVENOUS at 20:14

## 2019-02-03 RX ADMIN — ONDANSETRON 4 MG: 2 INJECTION INTRAMUSCULAR; INTRAVENOUS at 08:25

## 2019-02-03 RX ADMIN — LORAZEPAM 0.5 MG: 2 INJECTION INTRAMUSCULAR; INTRAVENOUS at 10:37

## 2019-02-03 RX ADMIN — PROCHLORPERAZINE EDISYLATE 10 MG: 5 INJECTION INTRAMUSCULAR; INTRAVENOUS at 12:33

## 2019-02-03 RX ADMIN — HALOPERIDOL LACTATE 5 MG: 5 INJECTION, SOLUTION INTRAMUSCULAR at 07:41

## 2019-02-03 RX ADMIN — SODIUM CHLORIDE 1000 ML: 9 INJECTION, SOLUTION INTRAVENOUS at 07:39

## 2019-02-03 RX ADMIN — DIPHENHYDRAMINE HYDROCHLORIDE 25 MG: 50 INJECTION INTRAMUSCULAR; INTRAVENOUS at 08:25

## 2019-02-03 RX ADMIN — DIPHENHYDRAMINE HYDROCHLORIDE 25 MG: 50 INJECTION, SOLUTION INTRAMUSCULAR; INTRAVENOUS at 07:43

## 2019-02-03 RX ADMIN — ONDANSETRON 4 MG: 2 INJECTION INTRAMUSCULAR; INTRAVENOUS at 07:40

## 2019-02-03 RX ADMIN — SODIUM CHLORIDE 1000 ML: 9 INJECTION, SOLUTION INTRAVENOUS at 09:33

## 2019-02-03 RX ADMIN — TRAZODONE HYDROCHLORIDE 100 MG: 50 TABLET ORAL at 20:12

## 2019-02-03 RX ADMIN — POTASSIUM CHLORIDE AND SODIUM CHLORIDE: 900; 150 INJECTION, SOLUTION INTRAVENOUS at 12:29

## 2019-02-03 ASSESSMENT — PATIENT HEALTH QUESTIONNAIRE - PHQ9
1. LITTLE INTEREST OR PLEASURE IN DOING THINGS: NOT AT ALL
SUM OF ALL RESPONSES TO PHQ9 QUESTIONS 1 AND 2: 0
1. LITTLE INTEREST OR PLEASURE IN DOING THINGS: NOT AT ALL
2. FEELING DOWN, DEPRESSED, IRRITABLE, OR HOPELESS: NOT AT ALL
2. FEELING DOWN, DEPRESSED, IRRITABLE, OR HOPELESS: NOT AT ALL
SUM OF ALL RESPONSES TO PHQ9 QUESTIONS 1 AND 2: 0

## 2019-02-03 ASSESSMENT — LIFESTYLE VARIABLES
EVER_SMOKED: YES
DO YOU DRINK ALCOHOL: NO

## 2019-02-03 ASSESSMENT — ENCOUNTER SYMPTOMS
FEVER: 0
CHILLS: 0
SHORTNESS OF BREATH: 0
NERVOUS/ANXIOUS: 1
ABDOMINAL PAIN: 1
NAUSEA: 1
DIARRHEA: 0
VOMITING: 1
INSOMNIA: 1
WEIGHT LOSS: 1

## 2019-02-03 ASSESSMENT — PAIN DESCRIPTION - DESCRIPTORS: DESCRIPTORS: TEARING

## 2019-02-03 NOTE — PROGRESS NOTES
Rounded on patient: patient appears to be sleeping, rise and fall of chest noted. Call light and belongings within reach.

## 2019-02-03 NOTE — H&P
Hospital Medicine History & Physical Note    Date of Service  2/3/2019    Primary Care Physician  Pcp Pt States None    Code Status  full    Chief Complaint  Abdominal pain    History of Presenting Illness  53 y.o. female who presented 2/3/2019 with abdominal pain and vomiting. Ms. Anglin has a past medical history of fibromyalgia, anxiety, and daily marijuana use that was admitted to the hospital 2019 with abdominal pain and vomiting.  She had a CAT scan of the abdomen was negative was given IV fluids and antiemetics with IV pain medications.  On that day she felt better and left the hospital AGAINST MEDICAL ADVICE.  She presents back today with ongoing symptoms of intractable cramping of her abdomen and vomiting.  Today she also has a headache and a CAT scan of the head was negative in the emergency room.  On her last admission we had a long discussion about cessation of marijuana as this may be contributing to her symptoms this was discussed again today.  Her ex- with whom she lives is at bedside notes that she is been under very excessive stress.  In the emergency room, we discussed refraining from narcotics and benzodiazepines and try to use other medications to help her symptoms.     Review of Systems  Review of Systems   Constitutional: Positive for weight loss. Negative for chills and fever.   Respiratory: Negative for shortness of breath.    Cardiovascular: Negative for chest pain.   Gastrointestinal: Positive for abdominal pain, nausea and vomiting. Negative for diarrhea.   Psychiatric/Behavioral: The patient is nervous/anxious and has insomnia.    All other systems reviewed and are negative.      Past Medical History   has a past medical history of Anxiety; Chronic back pain; and PVC (premature ventricular contraction).  Fibromyalgia    Surgical History  Radioablation therapy to her back    Family History  Her father  of a heart attack mother  in a motor vehicle accident    Social  History  She smokes marijuana as well as tobacco though denies alcohol    Allergies  No Known Allergies    Medications  Prior to Admission Medications   Prescriptions Last Dose Informant Patient Reported? Taking?   DULoxetine (CYMBALTA) 60 MG Cap DR Particles delayed-release capsule 2/2/2019 at Unknown time Patient Yes No   Sig: Take 60 mg by mouth every day.   valacyclovir (VALTREX) 1 GM Tab 2/2/2019 at Unknown time Patient Yes No   Sig: Take 1,000 mg by mouth Once PRN (Breakouts).      Facility-Administered Medications: None       Physical Exam  Temp:  [36.3 °C (97.4 °F)] 36.3 °C (97.4 °F)  Pulse:  [55-85] 70  Resp:  [14-32] 18  BP: (154-161)/(72-89) 161/72  SpO2:  [87 %-100 %] 98 %    Physical Exam   Constitutional: She is oriented to person, place, and time. She appears distressed.   Neck: Normal range of motion. Neck supple.   Cardiovascular:   No murmur heard.  Sinus bradycardia   Pulmonary/Chest: No respiratory distress. She has no wheezes. She has no rales.   She is tachypneic    Abdominal: Soft. She exhibits no distension.   Mild diffuse tenderness without rebound   Musculoskeletal: Normal range of motion. She exhibits no edema or tenderness.   Neurological: She is alert and oriented to person, place, and time.   Skin: Skin is warm. She is diaphoretic.   Psychiatric:   She is quite anxious, her ex- is at bedside   Nursing note and vitals reviewed.      Laboratory:  Recent Labs      02/01/19   1118  02/03/19   0730   WBC  8.3  10.8   RBC  4.40  4.23   HEMOGLOBIN  14.8  14.4   HEMATOCRIT  43.1  41.4   MCV  98.0*  97.9*   MCH  33.6*  34.0*   MCHC  34.3  34.8   RDW  44.1  43.5   PLATELETCT  150*  149*   MPV  10.9  10.9     Recent Labs      02/01/19   1118  02/03/19   0730   SODIUM  139  138   POTASSIUM  3.4*  3.4*   CHLORIDE  106  103   CO2  22  22   GLUCOSE  132*  119*   BUN  19  20   CREATININE  0.89  0.82   CALCIUM  9.4  9.4     Recent Labs      02/01/19   1118  02/03/19   0730   ALTSGPT  12  14    ASTSGOT  18  22   ALKPHOSPHAT  41  38   TBILIRUBIN  0.6  0.4   LIPASE  75  27   GLUCOSE  132*  119*                 No results for input(s): TROPONINI in the last 72 hours.    Urinalysis:    Recent Labs      02/03/19   1005   SPECGRAVITY  1.009   GLUCOSEUR  Negative   KETONES  Negative   NITRITE  Negative   LEUKESTERAS  Negative   WBCURINE  0-2   RBCURINE  0-2   BACTERIA  Negative   EPITHELCELL  Negative        Imaging:  CT-HEAD W/O   Final Result      No evidence of acute intracranial process.            Assessment/Plan:  I anticipate this patient is appropriate for observation status at this time.    * Generalized abdominal pain- (present on admission)   Assessment & Plan    This is her second episode in 3 days  On her previous ER visit, a CAT scan of the abdomen pelvis was negative  This is consistent with cannabinoid hyperemesis syndrome and likely exacerbated by her underlying severe anxiety  She may benefit from outpatient GI consultation     Cannabinoid hyperemesis syndrome (HCC)- (present on admission)   Assessment & Plan    Marijuana use was discussed and encouraged for cessation  There is evidence that Capsaicin cream placed on the abdomen can help with the symptoms and has been ordered  IV fluids and IV antiemetics as well as IV Haldol and Benadryl have been ordered  We will given her history, will try to refrain from benzodiazepines and narcotics which can be quite addictive     PVC's (premature ventricular contractions)- (present on admission)   Assessment & Plan    She is on metoprolol 50 mg daily at home  On her last hospital visit and this hospital visit she has been quite bradycardic therefore we will hold her metoprolol consider restarting half her home dose     Fibromyalgia- (present on admission)   Assessment & Plan    History of     Weight loss- (present on admission)   Assessment & Plan    This is been nonpurposeful patient attributes to this is a significant psychosocial stressors she is  undergoing recently     Anxiety- (present on admission)   Assessment & Plan    This is quite significant she may benefit from outpatient therapy     Tobacco use- (present on admission)   Assessment & Plan    Cessation has been discussed and nicotine patch will be offered         VTE prophylaxis: lovenox

## 2019-02-03 NOTE — ED TRIAGE NOTES
To triage by wheelchair with   Chief Complaint   Patient presents with   • Abdominal Pain     Mid Upper   Above symptom since 02:00. Hysterical and restless. States 10/10 mid upper pain. Last BP at 03:00 and states normal. Dry heaving. VSS.

## 2019-02-03 NOTE — ED TRIAGE NOTES
".  Chief Complaint   Patient presents with   • Abdominal Pain     Mid Upper   Agree with triage assessment.  Pt reports sudden onset low mid epigastric region pain early this morning.  + chronic low back pain.  No trauma.  + N/V/D - Last emesis at 0630, no blood.  No fever.  + chills.  Pt reports similar episodes in the past, \" could not find anything. \"  Pt recently moved to [place] and is trying to establish with a pain management physician.     "

## 2019-02-03 NOTE — ED NOTES
Med rec complete per pt at bedside   NKDA  Pt states she finished an ABX for strep throat the beginning of January

## 2019-02-03 NOTE — ED PROVIDER NOTES
ED Provider Note    Scribed for Ellis Gupta M.D. by Orlin Barriga. 2/3/2019  7:27 AM    Primary Care Provider: Pcp Pt States None  Means of arrival: Private Vehicle  History limited by: None    CHIEF COMPLAINT  Chief Complaint   Patient presents with   • Abdominal Pain     Mid Upper     HPI  Arabella Anglin is a 53 y.o. female who presents to the ED complaining of complaining of abdominal pain onset 2 AM this morning when she was asleep.  She describes this as mid-epigastric sharp pain with associated nausea, vomiting, loose stools, dyspnea, weakness, and chills.  She has had two prior episodes of this discomfort  The last episode of emesis as at 6:30 AM.  She denies any fever, headache, blurred vision, rhinorrhea, sore throat, diarrhea, chest pain, arm or leg weakness.  The patient has chronic low back pain that she used to mange with Oxycodone, but discontinued this 4 months ago.    The patient reports that she utilizes marijuana everyday.      REVIEW OF SYSTEMS    CONSTITUTIONAL:  Denies fever.  Positive for chills and weakness  EYES:  Denies blurred vision.   ENT:  Denies sore throat or rhinorrhea.  CARDIOVASCULAR:  Denies chest pain.  RESPIRATORY:  Positive for dyspnea.  GI:  Denies diarrhea.  Positive for abdominal pain, vomiting, nausea, and loose stools.   MUSCULOSKELETAL:  Admits chronic low back pain.  NEUROLOGIC:  Denies headache or extremity weakness.    PAST MEDICAL HISTORY  Past Medical History:   Diagnosis Date   • Anxiety    • Chronic back pain    • PVC (premature ventricular contraction)      FAMILY HISTORY  None noted.    SOCIAL HISTORY   reports that she has been smoking.  She has been smoking about 0.50 packs per day. She has never used smokeless tobacco. She reports that she uses drugs, including Inhaled. She reports that she does not drink alcohol.    SURGICAL HISTORY  History reviewed. No pertinent surgical history.    CURRENT MEDICATIONS  Home Medications     Reviewed by Diana DIOR  "GIAN Gutierrez (Registered Nurse) on 02/03/19 at 0713  Med List Status: Complete   Medication Last Dose Status   dicyclomine (BENTYL) 10 MG Cap not taking Active   DULoxetine (CYMBALTA) 60 MG Cap DR Particles delayed-release capsule 2/2/2019 Active   ibuprofen (MOTRIN) 200 MG Tab 2/2/2019 Active   metoprolol SR (TOPROL XL) 50 MG TABLET SR 24 HR 2/2/2019 Active   traZODone (DESYREL) 100 MG Tab 2/2/2019 Active   valacyclovir (VALTREX) 1 GM Tab 2/2/2019 Active              ALLERGIES  No Known Allergies    PHYSICAL EXAM  VITAL SIGNS: /89   Pulse 74   Temp 36.3 °C (97.4 °F) (Temporal)   Resp (!) 22   Ht 1.702 m (5' 7\")   Wt 63.5 kg (140 lb)   SpO2 100%   BMI 21.93 kg/m²      Constitutional: Patient is awake and alert. No acute respiratory distress.  Ill looking female patient is crying out loudly, not sitting still, and tossing and turning.  HENT: Normocephalic, Atraumatic, Bilateral external ears normal, Oropharynx pink dry with no exudates, Nose patent.  Eyes:  Sclera and conjunctiva injected, No discharge. Subconjunctival hemorrhage left eye.  Neck:  Supple no nuchal rigidity, no thyromegaly or mass. Non-tender  Lymphatic: No supraclavicular lymph nodes.   Cardiovascular: Heart is regular rate and rhythm no murmur,    Thorax & Lungs: Chest is symmetrical, with good breath sounds. No wheezing or crackles. No respiratory distress, No chest tenderness.   Abdomen: Soft, vague tenderness pain abdomen but it is soft there is no hepatosplenomegaly.  Skin: Warm, Dry, no petechia, purpura, or rash.   Back: Non tender with palpation, No CVA tenderness.   Extremities: No edema. Non tender.   Musculoskeletal: Good range of motion to upper and lower extremities.    Neurologic: Alert & oriented to person, time, and place.  Very anxious screaming out loud.    RADIOLOGY:  CT-HEAD W/O   Final Result      No evidence of acute intracranial process.         All radiology reports have been reviewed by me.      LABS  Results " for orders placed or performed during the hospital encounter of 02/03/19   CBC WITH DIFFERENTIAL   Result Value Ref Range    WBC 10.8 4.8 - 10.8 K/uL    RBC 4.23 4.20 - 5.40 M/uL    Hemoglobin 14.4 12.0 - 16.0 g/dL    Hematocrit 41.4 37.0 - 47.0 %    MCV 97.9 (H) 81.4 - 97.8 fL    MCH 34.0 (H) 27.0 - 33.0 pg    MCHC 34.8 33.6 - 35.0 g/dL    RDW 43.5 35.9 - 50.0 fL    Platelet Count 149 (L) 164 - 446 K/uL    MPV 10.9 9.0 - 12.9 fL    Neutrophils-Polys 82.40 (H) 44.00 - 72.00 %    Lymphocytes 12.50 (L) 22.00 - 41.00 %    Monocytes 3.70 0.00 - 13.40 %    Eosinophils 0.40 0.00 - 6.90 %    Basophils 0.50 0.00 - 1.80 %    Immature Granulocytes 0.50 0.00 - 0.90 %    Nucleated RBC 0.00 /100 WBC    Neutrophils (Absolute) 8.88 (H) 2.00 - 7.15 K/uL    Lymphs (Absolute) 1.35 1.00 - 4.80 K/uL    Monos (Absolute) 0.40 0.00 - 0.85 K/uL    Eos (Absolute) 0.04 0.00 - 0.51 K/uL    Baso (Absolute) 0.05 0.00 - 0.12 K/uL    Immature Granulocytes (abs) 0.05 0.00 - 0.11 K/uL    NRBC (Absolute) 0.00 K/uL   COMP METABOLIC PANEL   Result Value Ref Range    Sodium 138 135 - 145 mmol/L    Potassium 3.4 (L) 3.6 - 5.5 mmol/L    Chloride 103 96 - 112 mmol/L    Co2 22 20 - 33 mmol/L    Anion Gap 13.0 (H) 0.0 - 11.9    Glucose 119 (H) 65 - 99 mg/dL    Bun 20 8 - 22 mg/dL    Creatinine 0.82 0.50 - 1.40 mg/dL    Calcium 9.4 8.5 - 10.5 mg/dL    AST(SGOT) 22 12 - 45 U/L    ALT(SGPT) 14 2 - 50 U/L    Alkaline Phosphatase 38 30 - 99 U/L    Total Bilirubin 0.4 0.1 - 1.5 mg/dL    Albumin 4.7 3.2 - 4.9 g/dL    Total Protein 7.1 6.0 - 8.2 g/dL    Globulin 2.4 1.9 - 3.5 g/dL    A-G Ratio 2.0 g/dL   LIPASE   Result Value Ref Range    Lipase 27 11 - 82 U/L   URINALYSIS CULTURE, IF INDICATED   Result Value Ref Range    Color Yellow     Character Clear     Specific Gravity 1.009 <1.035    Ph 6.0 5.0 - 8.0    Glucose Negative Negative mg/dL    Ketones Negative Negative mg/dL    Protein Negative Negative mg/dL    Bilirubin Negative Negative    Urobilinogen, Urine  0.2 Negative    Nitrite Negative Negative    Leukocyte Esterase Negative Negative    Occult Blood Trace (A) Negative    Micro Urine Req Microscopic    DIAGNOSTIC ALCOHOL   Result Value Ref Range    Diagnostic Alcohol 0.00 0.00 g/dL   ESTIMATED GFR   Result Value Ref Range    GFR If African American >60 >60 mL/min/1.73 m 2    GFR If Non African American >60 >60 mL/min/1.73 m 2   URINE MICROSCOPIC (W/UA)   Result Value Ref Range    WBC 0-2 /hpf    RBC 0-2 /hpf    Bacteria Negative None /hpf    Epithelial Cells Negative /hpf    Hyaline Cast 0-2 /lpf      All labs reviewed by me.    COURSE & MEDICAL DECISION MAKING  Pertinent Labs & Imaging studies reviewed. (See chart for details)    Obtained prior records.  Review of records reveals the patient has previously been evaluated for similar complaints with negative CT scan results.  The patient received 60 pills of buporpherine on 12/21/2018.      Differential diagnoses include but are not limited to gallbladder disease renal colic perforated viscus intussusception volvulus appendicitis.    7:27 AM - Patient seen and examined at bedside. Ordered Diagnostic alcohol, Urine drug screen, CBC, CMP, Lipase, Urinalysis.  Patient will be medicated with 4 mg Zofran, 25 mg Benadryl, and 5 mg Haldol for her symptoms.  I discussed the plan of care inclusive of laboratory evaluation and medications to manage her pain.  She agrees with this plan of care. Patient was given 1L of fluid for dehydration and vomiting, we will not proceed with oral challenge at this time due to the potential for surgical intervention.    8:18 AM The nursing staff reports the patient was agitated and in discomfort.  Ordered 4 mg Zofran and 25 mg Benadryl to treat her symptoms.    8:29 AM Patient was reevaluated at the bedside and is sleeping comfortably at this time.      9:31 AM Patient is resting comfortably.      10:16 AM Patient was reevaluated at the bedside.  She states her abdominal pain, nausea, and  vomiting are better at this time but she has a new onset of a headache.  I discussed Cannabinoid hyperemesis syndrome with the patient and options of treatment of this.  Ordered urine microscopic and CT of the head to evaluate symptoms.    10:31 AM Patient is sitting on the edge of the bed, ill appearing and holding an emesis bag.  Patient will be treated with 0.5 mg Ativan and 4 mg Zofran.     11:06 AM I discussed that the results of her CT scan are normal.  We discussed the option of admission to the hospital for continued monitoring and symptom treatment versus discharge home.  The patient desires to discharged from the hospital at this time and will be prescribed Zofran for home treatment of her symptoms.  She will avoid driving today due to the medications she has been given.  The patient will return with any worsening symptoms.  There was a positive response to IV fluids.      11:12 AM Nursing staff reports the patient does not feel safe to go home in her current condition despite our prior conversation.  We will proceed with admission to the hospital.       11:15 AM Discussed the patient's case with Dr. Grimes.  Dr. Grimes saw the patient two days ago and agrees to admit the patient.      Decision Making  Patient has had several episodes previously of something very similar.  She has a daily marijuana user.  She admitted to the hospital previously felt that marijuana induced hyperemesis.  She was here 2 days ago was signed out AGAINST MEDICAL ADVICE.  Now she is back in here for the same symptoms.  She is already had a CAT scan done that was normal.  Here in the emergency room we did blood work on her we have given her IV fluids because she is clinically dehydrated.  We have also given her Zofran Benadryl Haldol and Ativan.  This seemed to have calmed her down some but then she developed a severe headache I CAT scan her head to make sure she did not have some type of intracranial bleed.  This is normal.   Initially she thought she might want to go home but 5 minutes later she told me and the nurse that she did not feel that she can go home.  Therefore I discussed the case with Dr. Grimes who knew the patient from a couple of days ago and he will admit her to the hospital continue workup and evaluation.    IV fluid resuscitation reevaluation patient seems to be doing better after IV fluids she is better hydrated feels somewhat better but still nauseated and have    DISPOSITION:  Patient will be admitted to Dr. Grimes in guarded condition.      FINAL IMPRESSION  1. Abdominal pain, unspecified abdominal location    2. Intractable cyclical vomiting with nausea    3. Marijuana abuse      PLAN  1.  Admission [facility]  2.  Continue treatment and workup of her vomiting and abdominal pain       Orlin MENCHACA (Scribe), am scribing for, and in the presence of, Ellis Gupta M.D..    Electronically signed by: Orlin Barriga (Scribe), 2/3/2019    IEllis M.D. personally performed the services described in this documentation, as scribed by Orlin Barriga in my presence, and it is both accurate and complete.  C.    The note accurately reflects work and decisions made by me.  Ellis Gupta  2/3/2019  12:43 PM

## 2019-02-03 NOTE — PROGRESS NOTES
Rounded on patient: patient continues to sleep. NAD noted. Call light and belongings within reach.

## 2019-02-03 NOTE — ED NOTES
"Pt tearful, states \" I'm not feeling that much better, I can't go home like this. \"  ERP notified.    "

## 2019-02-03 NOTE — PROGRESS NOTES
Assessment completed. Pt A&Ox4. Mild breathing effort noted, sating 93% on RA. Pt reports epigastric pain at this time, 9/10. Dry heaving, moaning and restless. Answers questions appropriately. Monitors applied, hypertensive (medication per MAR). Call light and belongings within reach. POC updated. Pt educated on room and call light, pt verbalized understanding. Communication board updated. Needs met. Clear liquids provided.  at bedside.

## 2019-02-04 VITALS
OXYGEN SATURATION: 97 % | WEIGHT: 151.9 LBS | DIASTOLIC BLOOD PRESSURE: 78 MMHG | HEIGHT: 67 IN | BODY MASS INDEX: 23.84 KG/M2 | RESPIRATION RATE: 18 BRPM | TEMPERATURE: 99 F | SYSTOLIC BLOOD PRESSURE: 131 MMHG | HEART RATE: 72 BPM

## 2019-02-04 PROBLEM — R10.84 GENERALIZED ABDOMINAL PAIN: Status: RESOLVED | Noted: 2019-02-01 | Resolved: 2019-02-04

## 2019-02-04 PROBLEM — M79.7 FIBROMYALGIA: Status: RESOLVED | Noted: 2019-02-01 | Resolved: 2019-02-04

## 2019-02-04 PROBLEM — F17.200 TOBACCO DEPENDENCE: Status: ACTIVE | Noted: 2019-02-01

## 2019-02-04 PROCEDURE — G0378 HOSPITAL OBSERVATION PER HR: HCPCS

## 2019-02-04 PROCEDURE — A9270 NON-COVERED ITEM OR SERVICE: HCPCS | Performed by: HOSPITALIST

## 2019-02-04 PROCEDURE — 96376 TX/PRO/DX INJ SAME DRUG ADON: CPT

## 2019-02-04 PROCEDURE — 700101 HCHG RX REV CODE 250: Performed by: HOSPITALIST

## 2019-02-04 PROCEDURE — 700111 HCHG RX REV CODE 636 W/ 250 OVERRIDE (IP): Performed by: HOSPITALIST

## 2019-02-04 PROCEDURE — 700102 HCHG RX REV CODE 250 W/ 637 OVERRIDE(OP): Performed by: HOSPITALIST

## 2019-02-04 PROCEDURE — 99217 PR OBSERVATION CARE DISCHARGE: CPT | Performed by: INTERNAL MEDICINE

## 2019-02-04 RX ORDER — NICOTINE 21 MG/24HR
1 PATCH, TRANSDERMAL 24 HOURS TRANSDERMAL EVERY 24 HOURS
Qty: 30 PATCH | Refills: 0 | Status: SHIPPED | OUTPATIENT
Start: 2019-02-04 | End: 2019-02-15 | Stop reason: SDUPTHER

## 2019-02-04 RX ORDER — POTASSIUM CHLORIDE 20 MEQ/1
40 TABLET, EXTENDED RELEASE ORAL ONCE
Status: DISCONTINUED | OUTPATIENT
Start: 2019-02-04 | End: 2019-02-04 | Stop reason: HOSPADM

## 2019-02-04 RX ADMIN — DULOXETINE HYDROCHLORIDE 60 MG: 60 CAPSULE, DELAYED RELEASE ORAL at 05:32

## 2019-02-04 RX ADMIN — HALOPERIDOL LACTATE 5 MG: 5 INJECTION, SOLUTION INTRAMUSCULAR at 00:47

## 2019-02-04 RX ADMIN — NICOTINE 21 MG: 21 PATCH, EXTENDED RELEASE TRANSDERMAL at 05:33

## 2019-02-04 RX ADMIN — POTASSIUM CHLORIDE AND SODIUM CHLORIDE: 900; 150 INJECTION, SOLUTION INTRAVENOUS at 04:14

## 2019-02-04 RX ADMIN — DIPHENHYDRAMINE HYDROCHLORIDE 25 MG: 50 INJECTION INTRAMUSCULAR; INTRAVENOUS at 04:24

## 2019-02-04 NOTE — DISCHARGE PLANNING
Care Transition Team Assessment    Spoke with patient at bedside. Mailing address in [facility] but recently moved to [place] and lives in [facility] but does not know address. Lives with spouse and daughter. Has appointment with Dr Abrams in the next two weeks. Spouse or daughter will be ride @ D/C. Anticipate no needs @ present time.    Information Source  Orientation : Oriented x 4  Information Given By: Patient  Informant's Name: Arabella Anglin    Readmission Evaluation  Is this a readmission?: No    Interdisciplinary Discharge Planning  Does Admitting Nurse Feel This Could be a Complex Discharge?: No  Primary Care Physician: Aliza  Lives with - Patient's Self Care Capacity: Spouse  Patient or legal guardian wants to designate a caregiver (see row info): No  Support Systems: Children, Spouse / Significant Other  Housing / Facility: Mobile Home  Do You Take your Prescribed Medications Regularly: Yes  Able to Return to Previous ADL's: Yes  Mobility Issues: No  Prior Services: None  Patient Expects to be Discharged to:: Home  Assistance Needed: No  Durable Medical Equipment: Not Applicable    Discharge Preparedness  What are your discharge supports?: Child, Spouse  Prior Functional Level: Ambulatory  Difficulity with ADLs: None  Difficulity with IADLs: None    Functional Assesment  Prior Functional Level: Ambulatory    Finances  Prescription Coverage: Yes    Vision / Hearing Impairment  Vision Impairment : No  Hearing Impairment : No    Anticipated Discharge Information  Anticipated discharge disposition: Home  Discharge Address: [facility]  Discharge Contact Phone Number: [phone]

## 2019-02-04 NOTE — ASSESSMENT & PLAN NOTE
This is her second episode in 3 days  On her previous ER visit, a CAT scan of the abdomen pelvis was negative  This is consistent with cannabinoid hyperemesis syndrome and likely exacerbated by her underlying severe anxiety  She may benefit from outpatient GI consultation

## 2019-02-04 NOTE — PROGRESS NOTES
A/o respirations are even and unlabored on room air ,assessment completed, denies any nausea, pt complaining of abdominal pain 3/10, vital signs stable, IV fluids running per orders, updated communication board,  poc discussed and understood, verbalized understanding, box meal given, all questions answered at this time , fall precautions in place, call button within reach, will continue to monitor

## 2019-02-04 NOTE — ASSESSMENT & PLAN NOTE
She is on metoprolol 50 mg daily at home  On her last hospital visit and this hospital visit she has been quite bradycardic therefore we will hold her metoprolol consider restarting half her home dose

## 2019-02-04 NOTE — PROGRESS NOTES
Assessment completed. Pt A&Ox4. Respirations are even and unlabored on RA. Pt denies abdominal pain at this time. Denies nausea-eating sandwich at this time. Patient states she is ready to go home. Monitors applied, VS stable, call light and belongings within reach. POC updated. Pt educated on room and call light, pt verbalized understanding. Communication board updated. Needs met.

## 2019-02-04 NOTE — ASSESSMENT & PLAN NOTE
This is been nonpurposeful patient attributes to this is a significant psychosocial stressors she is undergoing recently

## 2019-02-04 NOTE — PROGRESS NOTES
Rounded on patient: patient awake, brighter affect, appears comfortable. States she feels better after sleeping. Denies needs at this time. VSS.

## 2019-02-04 NOTE — PROGRESS NOTES
Patient requesting to go home right now. Patient states she will have a panic attack if we don't get her out. De-escalation required, encouraged patient to use coping skills. IV Dc 'd. Discharge instructions provided to patient. Pt verbalizes understanding. Pt states all questions have been answered. Copy of DC provided to patient. Signed copy in chart. 2 prescriptions sent to her pharmacy. Pt states all personal belongings are in possession. Pt escorted off unit by tech via w/c without incident.   to  at ER entrance.

## 2019-02-04 NOTE — ASSESSMENT & PLAN NOTE
Marijuana use was discussed and encouraged for cessation  There is evidence that Capsaicin cream placed on the abdomen can help with the symptoms and has been ordered  IV fluids and IV antiemetics as well as IV Haldol and Benadryl have been ordered  We will given her history, will try to refrain from benzodiazepines and narcotics which can be quite addictive

## 2019-02-04 NOTE — DISCHARGE INSTRUCTIONS
Discharge Instructions    Discharged to home by car with relative. Discharged via wheelchair, hospital escort: Yes.  Special equipment needed: Not Applicable    Be sure to schedule a follow-up appointment with your primary care doctor or any specialists as instructed.     Discharge Plan:   Diet Plan: Discussed  Activity Level: Discussed  Smoking Cessation Offered: Patient Counseled  Confirmed Follow up Appointment: Patient to Call and Schedule Appointment  Confirmed Symptoms Management: Discussed  Medication Reconciliation Updated: Yes  Pneumococcal Vaccine Administered/Refused: Not given - Patient refused pneumococcal vaccine  Influenza Vaccine Indication: Not indicated: Previously immunized this influenza season and > 8 years of age    I understand that a diet low in cholesterol, fat, and sodium is recommended for good health. Unless I have been given specific instructions below for another diet, I accept this instruction as my diet prescription.   Other diet: heart healthy    Special Instructions: None    · Is patient discharged on Warfarin / Coumadin?   No         Activity: As tolerated.   Diet: As tolerated   Followup: With PCP or mental health facility- for help with anxiety and depression   Instructions:   -Please stop using marijuana   -Take nausea medications as needed   -Given instructions to return to the ER if any new or worsening symptoms, worsening condition, or failure to improve   -Call PCP for followup   -No smoking, no alcohol, no caffeine   -Encourage risk factor reduction with tobacco and alcohol abstinence, diet changes, weight loss, and exercise.                Depression / Suicide Risk    As you are discharged from this [facility] facility, it is important to learn how to keep safe from harming yourself.    Recognize the warning signs:  · Abrupt changes in personality, positive or negative- including increase in energy   · Giving away possessions  · Change in eating patterns- significant  weight changes-  positive or negative  · Change in sleeping patterns- unable to sleep or sleeping all the time   · Unwillingness or inability to communicate  · Depression  · Unusual sadness, discouragement and loneliness  · Talk of wanting to die  · Neglect of personal appearance   · Rebelliousness- reckless behavior  · Withdrawal from people/activities they love  · Confusion- inability to concentrate     If you or a loved one observes any of these behaviors or has concerns about self-harm, here's what you can do:  · Talk about it- your feelings and reasons for harming yourself  · Remove any means that you might use to hurt yourself (examples: pills, rope, extension cords, firearm)  · Get professional help from the community (Mental Health, Substance Abuse, psychological counseling)  · Do not be alone:Call your Safe Contact- someone whom you trust who will be there for you.  · Call your local CRISIS HOTLINE 595-6254 or 513-152-4116  · Call your local Children's Mobile Crisis Response Team [place] (253) 279-6229 or www.Nexio  · Call the toll free National Suicide Prevention Hotlines   · National Suicide Prevention Lifeline 727-086-HXUO (0717)  · National Hope Line Network 800-SUICIDE (277-6681)

## 2019-02-10 ENCOUNTER — HOSPITAL ENCOUNTER (EMERGENCY)
Facility: MEDICAL CENTER | Age: 54
End: 2019-02-10
Attending: EMERGENCY MEDICINE
Payer: MEDICARE

## 2019-02-10 VITALS
HEART RATE: 63 BPM | RESPIRATION RATE: 16 BRPM | WEIGHT: 145 LBS | BODY MASS INDEX: 22.76 KG/M2 | OXYGEN SATURATION: 100 % | HEIGHT: 67 IN | SYSTOLIC BLOOD PRESSURE: 160 MMHG | TEMPERATURE: 98.2 F | DIASTOLIC BLOOD PRESSURE: 81 MMHG

## 2019-02-10 DIAGNOSIS — F12.90 CANNABINOID HYPEREMESIS SYNDROME: ICD-10-CM

## 2019-02-10 DIAGNOSIS — R11.2 CANNABINOID HYPEREMESIS SYNDROME: ICD-10-CM

## 2019-02-10 DIAGNOSIS — R11.15 NON-INTRACTABLE CYCLICAL VOMITING WITHOUT NAUSEA: ICD-10-CM

## 2019-02-10 LAB
ALBUMIN SERPL BCP-MCNC: 5 G/DL (ref 3.2–4.9)
ALBUMIN/GLOB SERPL: 1.9 G/DL
ALP SERPL-CCNC: 44 U/L (ref 30–99)
ALT SERPL-CCNC: 17 U/L (ref 2–50)
AMPHET UR QL SCN: NEGATIVE
ANION GAP SERPL CALC-SCNC: 12 MMOL/L (ref 0–11.9)
APPEARANCE UR: ABNORMAL
AST SERPL-CCNC: 28 U/L (ref 12–45)
BACTERIA #/AREA URNS HPF: NEGATIVE /HPF
BARBITURATES UR QL SCN: NEGATIVE
BASOPHILS # BLD AUTO: 0.5 % (ref 0–1.8)
BASOPHILS # BLD: 0.05 K/UL (ref 0–0.12)
BENZODIAZ UR QL SCN: NEGATIVE
BILIRUB SERPL-MCNC: 0.5 MG/DL (ref 0.1–1.5)
BILIRUB UR QL STRIP.AUTO: NEGATIVE
BUN SERPL-MCNC: 16 MG/DL (ref 8–22)
BZE UR QL SCN: NEGATIVE
CALCIUM SERPL-MCNC: 10.2 MG/DL (ref 8.5–10.5)
CANNABINOIDS UR QL SCN: POSITIVE
CHLORIDE SERPL-SCNC: 102 MMOL/L (ref 96–112)
CO2 SERPL-SCNC: 23 MMOL/L (ref 20–33)
COLOR UR: YELLOW
CREAT SERPL-MCNC: 0.93 MG/DL (ref 0.5–1.4)
EOSINOPHIL # BLD AUTO: 0.01 K/UL (ref 0–0.51)
EOSINOPHIL NFR BLD: 0.1 % (ref 0–6.9)
EPI CELLS #/AREA URNS HPF: NEGATIVE /HPF
ERYTHROCYTE [DISTWIDTH] IN BLOOD BY AUTOMATED COUNT: 46.1 FL (ref 35.9–50)
GLOBULIN SER CALC-MCNC: 2.6 G/DL (ref 1.9–3.5)
GLUCOSE SERPL-MCNC: 126 MG/DL (ref 65–99)
GLUCOSE UR STRIP.AUTO-MCNC: NEGATIVE MG/DL
HCT VFR BLD AUTO: 44 % (ref 37–47)
HGB BLD-MCNC: 14.9 G/DL (ref 12–16)
HYALINE CASTS #/AREA URNS LPF: ABNORMAL /LPF
IMM GRANULOCYTES # BLD AUTO: 0.05 K/UL (ref 0–0.11)
IMM GRANULOCYTES NFR BLD AUTO: 0.5 % (ref 0–0.9)
KETONES UR STRIP.AUTO-MCNC: NEGATIVE MG/DL
LEUKOCYTE ESTERASE UR QL STRIP.AUTO: NEGATIVE
LIPASE SERPL-CCNC: 28 U/L (ref 11–82)
LYMPHOCYTES # BLD AUTO: 1.24 K/UL (ref 1–4.8)
LYMPHOCYTES NFR BLD: 12.3 % (ref 22–41)
MCH RBC QN AUTO: 33.9 PG (ref 27–33)
MCHC RBC AUTO-ENTMCNC: 33.9 G/DL (ref 33.6–35)
MCV RBC AUTO: 100.2 FL (ref 81.4–97.8)
METHADONE UR QL SCN: NEGATIVE
MICRO URNS: ABNORMAL
MONOCYTES # BLD AUTO: 0.35 K/UL (ref 0–0.85)
MONOCYTES NFR BLD AUTO: 3.5 % (ref 0–13.4)
NEUTROPHILS # BLD AUTO: 8.41 K/UL (ref 2–7.15)
NEUTROPHILS NFR BLD: 83.1 % (ref 44–72)
NITRITE UR QL STRIP.AUTO: NEGATIVE
NRBC # BLD AUTO: 0 K/UL
NRBC BLD-RTO: 0 /100 WBC
OPIATES UR QL SCN: NEGATIVE
OXYCODONE UR QL SCN: NEGATIVE
PCP UR QL SCN: NEGATIVE
PH UR STRIP.AUTO: >=9 [PH]
PLATELET # BLD AUTO: 197 K/UL (ref 164–446)
PMV BLD AUTO: 10.5 FL (ref 9–12.9)
POTASSIUM SERPL-SCNC: 3.9 MMOL/L (ref 3.6–5.5)
PROPOXYPH UR QL SCN: NEGATIVE
PROT SERPL-MCNC: 7.6 G/DL (ref 6–8.2)
PROT UR QL STRIP: NEGATIVE MG/DL
RBC # BLD AUTO: 4.39 M/UL (ref 4.2–5.4)
RBC # URNS HPF: ABNORMAL /HPF
RBC UR QL AUTO: NEGATIVE
SODIUM SERPL-SCNC: 137 MMOL/L (ref 135–145)
SP GR UR STRIP.AUTO: 1.02
UROBILINOGEN UR STRIP.AUTO-MCNC: 0.2 MG/DL
WBC # BLD AUTO: 10.1 K/UL (ref 4.8–10.8)
WBC #/AREA URNS HPF: ABNORMAL /HPF

## 2019-02-10 PROCEDURE — 96374 THER/PROPH/DIAG INJ IV PUSH: CPT

## 2019-02-10 PROCEDURE — 80307 DRUG TEST PRSMV CHEM ANLYZR: CPT

## 2019-02-10 PROCEDURE — 700105 HCHG RX REV CODE 258: Performed by: EMERGENCY MEDICINE

## 2019-02-10 PROCEDURE — 96375 TX/PRO/DX INJ NEW DRUG ADDON: CPT

## 2019-02-10 PROCEDURE — 700111 HCHG RX REV CODE 636 W/ 250 OVERRIDE (IP): Performed by: EMERGENCY MEDICINE

## 2019-02-10 PROCEDURE — 80053 COMPREHEN METABOLIC PANEL: CPT

## 2019-02-10 PROCEDURE — 83690 ASSAY OF LIPASE: CPT

## 2019-02-10 PROCEDURE — 81001 URINALYSIS AUTO W/SCOPE: CPT | Mod: XU

## 2019-02-10 PROCEDURE — 85025 COMPLETE CBC W/AUTO DIFF WBC: CPT

## 2019-02-10 PROCEDURE — 36415 COLL VENOUS BLD VENIPUNCTURE: CPT

## 2019-02-10 PROCEDURE — 99284 EMERGENCY DEPT VISIT MOD MDM: CPT

## 2019-02-10 RX ORDER — SODIUM CHLORIDE, SODIUM LACTATE, POTASSIUM CHLORIDE, CALCIUM CHLORIDE 600; 310; 30; 20 MG/100ML; MG/100ML; MG/100ML; MG/100ML
INJECTION, SOLUTION INTRAVENOUS ONCE
Status: COMPLETED | OUTPATIENT
Start: 2019-02-10 | End: 2019-02-10

## 2019-02-10 RX ORDER — LORAZEPAM 2 MG/ML
1 INJECTION INTRAMUSCULAR ONCE
Status: COMPLETED | OUTPATIENT
Start: 2019-02-10 | End: 2019-02-10

## 2019-02-10 RX ORDER — METOCLOPRAMIDE HYDROCHLORIDE 5 MG/ML
10 INJECTION INTRAMUSCULAR; INTRAVENOUS ONCE
Status: COMPLETED | OUTPATIENT
Start: 2019-02-10 | End: 2019-02-10

## 2019-02-10 RX ORDER — PROMETHAZINE HYDROCHLORIDE 25 MG/1
25 SUPPOSITORY RECTAL EVERY 6 HOURS PRN
Qty: 10 SUPPOSITORY | Refills: 2 | Status: SHIPPED | OUTPATIENT
Start: 2019-02-10 | End: 2019-02-14

## 2019-02-10 RX ORDER — DIPHENHYDRAMINE HYDROCHLORIDE 50 MG/ML
25 INJECTION INTRAMUSCULAR; INTRAVENOUS ONCE
Status: COMPLETED | OUTPATIENT
Start: 2019-02-10 | End: 2019-02-10

## 2019-02-10 RX ORDER — HALOPERIDOL 5 MG/ML
1 INJECTION INTRAMUSCULAR ONCE
Status: COMPLETED | OUTPATIENT
Start: 2019-02-10 | End: 2019-02-10

## 2019-02-10 RX ADMIN — HALOPERIDOL LACTATE 1 MG: 5 INJECTION, SOLUTION INTRAMUSCULAR at 11:53

## 2019-02-10 RX ADMIN — LORAZEPAM 1 MG: 2 INJECTION INTRAMUSCULAR; INTRAVENOUS at 13:00

## 2019-02-10 RX ADMIN — METOCLOPRAMIDE 10 MG: 5 INJECTION, SOLUTION INTRAMUSCULAR; INTRAVENOUS at 11:53

## 2019-02-10 RX ADMIN — SODIUM CHLORIDE, POTASSIUM CHLORIDE, SODIUM LACTATE AND CALCIUM CHLORIDE: 600; 310; 30; 20 INJECTION, SOLUTION INTRAVENOUS at 12:15

## 2019-02-10 RX ADMIN — DIPHENHYDRAMINE HYDROCHLORIDE 25 MG: 50 INJECTION INTRAMUSCULAR; INTRAVENOUS at 11:53

## 2019-02-10 NOTE — ED NOTES
Chief Complaint   Patient presents with   • N/V   • Head Ache     Pt wheeled to triage, screaming, unable to sit still. C/o n/v , pt recently d/c from hospital for cyclic vomiting. She was prescribed zofran but unable to fill rx. Pt said that she stopped smoking marijuana.

## 2019-02-10 NOTE — ED PROVIDER NOTES
ED Provider Note    CHIEF COMPLAINT  Chief Complaint   Patient presents with   • N/V   • Head Ache       HPI  Arabella Anglin is a 53 y.o. female who presents for evaluation of ongoing epigastric abdominal pain nausea vomiting headache.  The patient has a known history of anxiety, hyperemesis cannabis syndrome.  She was recently admitted at this facility earlier this week had extensive workup including CT scan of the head while CT scan of the abdomen and pelvis which were unremarkable.  She was positive for cannabinoids.  She denies any ongoing THC use.  Pain is epigastric gnawing sensation similar to how she presented last week.  Headache is throbbing bifrontal no associated double vision numbness weakness or tingling.  The patient denies IV drug use.  No other symptoms such as fevers chills night sweats or weight loss.  Nothing makes it better or worse  REVIEW OF SYSTEMS  See HPI for further details.  No night sweats weight loss numbness tingling weakness rash all other systems are negative.     PAST MEDICAL HISTORY  Past Medical History:   Diagnosis Date   • Anxiety    • Chronic back pain    • PVC (premature ventricular contraction)        FAMILY HISTORY  Noncontributory    SOCIAL HISTORY  Social History     Social History   • Marital status: Single     Spouse name: N/A   • Number of children: N/A   • Years of education: N/A     Social History Main Topics   • Smoking status: Current Every Day Smoker     Packs/day: 0.50   • Smokeless tobacco: Never Used   • Alcohol use No   • Drug use: Yes     Types: Inhaled      Comment: Daily marijuana use.    • Sexual activity: Not on file     Other Topics Concern   • Not on file     Social History Narrative   • No narrative on file     Smoke cigarettes  SURGICAL HISTORY  No past surgical history on file.    CURRENT MEDICATIONS  Home Medications     Reviewed by Justina Devries R.N. (Registered Nurse) on 02/10/19 at 1114  Med List Status: Unable to Obtain   Medication Last  "Dose Status   DULoxetine (CYMBALTA) 60 MG Cap DR Particles delayed-release capsule  Active   metoprolol SR (TOPROL XL) 100 MG TABLET SR 24 HR  Active   nicotine (NICODERM) 21 MG/24HR PATCH 24 HR  Active   ondansetron (ZOFRAN ODT) 4 MG TABLET DISPERSIBLE  Active   traZODone (DESYREL) 150 MG Tab  Active   valacyclovir (VALTREX) 1 GM Tab  Active                ALLERGIES  No Known Allergies    PHYSICAL EXAM  VITAL SIGNS: BP (!) 185/85   Pulse 63   Temp 36.8 °C (98.2 °F) (Temporal)   Resp 16   Ht 1.702 m (5' 7\")   Wt 65.8 kg (145 lb)   SpO2 94%   BMI 22.71 kg/m²  Room air O2: 97    Constitutional: Patient appears uncomfortable  HENT: Normocephalic, Atraumatic, Bilateral external ears normal, dry mucous membrane t, No oral exudates, Nose normal.   Eyes: PERRLA, EOMI, Conjunctiva normal, No discharge.   Neck: Normal range of motion, No tenderness, Supple, No stridor.   Cardiovascular: Normal heart rate, Normal rhythm, No murmurs, No rubs, No gallops.   Thorax & Lungs: Normal breath sounds, No respiratory distress, No wheezing, No chest tenderness.   Abdomen: Bowel sounds normal, Soft, No tenderness, No masses, No pulsatile masses.   Skin: Warm, Dry, No erythema, No rash.   Back: No tenderness, No CVA tenderness.   Extremities: Intact distal pulses, No edema, No tenderness, No cyanosis, No clubbing.   Musculoskeletal: Good range of motion in all major joints. No tenderness to palpation or major deformities noted.   Neurologic: Alert & oriented x 3, Normal motor function, Normal sensory function, No focal deficits noted.   Psychiatric: A agitated l.     Results for orders placed or performed during the hospital encounter of 02/10/19   CBC WITH DIFFERENTIAL   Result Value Ref Range    WBC 10.1 4.8 - 10.8 K/uL    RBC 4.39 4.20 - 5.40 M/uL    Hemoglobin 14.9 12.0 - 16.0 g/dL    Hematocrit 44.0 37.0 - 47.0 %    .2 (H) 81.4 - 97.8 fL    MCH 33.9 (H) 27.0 - 33.0 pg    MCHC 33.9 33.6 - 35.0 g/dL    RDW 46.1 35.9 - " 50.0 fL    Platelet Count 197 164 - 446 K/uL    MPV 10.5 9.0 - 12.9 fL    Neutrophils-Polys 83.10 (H) 44.00 - 72.00 %    Lymphocytes 12.30 (L) 22.00 - 41.00 %    Monocytes 3.50 0.00 - 13.40 %    Eosinophils 0.10 0.00 - 6.90 %    Basophils 0.50 0.00 - 1.80 %    Immature Granulocytes 0.50 0.00 - 0.90 %    Nucleated RBC 0.00 /100 WBC    Neutrophils (Absolute) 8.41 (H) 2.00 - 7.15 K/uL    Lymphs (Absolute) 1.24 1.00 - 4.80 K/uL    Monos (Absolute) 0.35 0.00 - 0.85 K/uL    Eos (Absolute) 0.01 0.00 - 0.51 K/uL    Baso (Absolute) 0.05 0.00 - 0.12 K/uL    Immature Granulocytes (abs) 0.05 0.00 - 0.11 K/uL    NRBC (Absolute) 0.00 K/uL   Comp Metabolic Panel   Result Value Ref Range    Sodium 137 135 - 145 mmol/L    Potassium 3.9 3.6 - 5.5 mmol/L    Chloride 102 96 - 112 mmol/L    Co2 23 20 - 33 mmol/L    Anion Gap 12.0 (H) 0.0 - 11.9    Glucose 126 (H) 65 - 99 mg/dL    Bun 16 8 - 22 mg/dL    Creatinine 0.93 0.50 - 1.40 mg/dL    Calcium 10.2 8.5 - 10.5 mg/dL    AST(SGOT) 28 12 - 45 U/L    ALT(SGPT) 17 2 - 50 U/L    Alkaline Phosphatase 44 30 - 99 U/L    Total Bilirubin 0.5 0.1 - 1.5 mg/dL    Albumin 5.0 (H) 3.2 - 4.9 g/dL    Total Protein 7.6 6.0 - 8.2 g/dL    Globulin 2.6 1.9 - 3.5 g/dL    A-G Ratio 1.9 g/dL   LIPASE   Result Value Ref Range    Lipase 28 11 - 82 U/L   URINE DRUG SCREEN   Result Value Ref Range    Amphetamines Urine Negative Negative    Barbiturates Negative Negative    Benzodiazepines Negative Negative    Cocaine Metabolite Negative Negative    Methadone Negative Negative    Opiates Negative Negative    Oxycodone Negative Negative    Phencyclidine -Pcp Negative Negative    Propoxyphene Negative Negative    Cannabinoid Metab Positive (A) Negative   URINALYSIS   Result Value Ref Range    Color Yellow     Character Turbid (A)     Specific Gravity 1.016 <1.035    Ph >=9.0 (A) 5.0 - 8.0    Glucose Negative Negative mg/dL    Ketones Negative Negative mg/dL    Protein Negative Negative mg/dL    Bilirubin Negative  Negative    Urobilinogen, Urine 0.2 Negative    Nitrite Negative Negative    Leukocyte Esterase Negative Negative    Occult Blood Negative Negative    Micro Urine Req Microscopic    ESTIMATED GFR   Result Value Ref Range    GFR If African American >60 >60 mL/min/1.73 m 2    GFR If Non African American >60 >60 mL/min/1.73 m 2   URINE MICROSCOPIC (W/UA)   Result Value Ref Range    WBC 0-2 /hpf    RBC 5-10 (A) /hpf    Bacteria Negative None /hpf    Epithelial Cells Negative /hpf    Hyaline Cast 0-2 /lpf      COURSE & MEDICAL DECISION MAKING  Pertinent Labs & Imaging studies reviewed. (See chart for details)  An IV had already been previously established.  Patient was significantly nauseous and could not take clear liquids therefore she was given IV fluids.  I performed an extensive chart review and reviewed all of her recent records including recent admission etc.  Her workup was entirely reassuring.  She was given IV Reglan Benadryl Haldol and Ativan.  I reviewed the patient's records as well and she had normal CT scan of the abdomen as well as CT scan of the head within the last week.  Her laboratory studies are all normal reassuring and she is still positive for cannabis.  I counseled the patient that I still feel that this is likely hyperemesis cannabis syndrome.  I will discharge her home with Phenergan suppositories and referral to the [facility]    FINAL IMPRESSION  1.  1. Cannabinoid hyperemesis syndrome (HCC)    2. Non-intractable cyclical vomiting without nausea             Electronically signed by: Brown Mcwilliams, 2/10/2019 11:44 AM

## 2019-02-14 ENCOUNTER — HOSPITAL ENCOUNTER (EMERGENCY)
Facility: MEDICAL CENTER | Age: 54
End: 2019-02-14
Attending: EMERGENCY MEDICINE
Payer: MEDICARE

## 2019-02-14 VITALS
HEART RATE: 69 BPM | WEIGHT: 144.62 LBS | OXYGEN SATURATION: 98 % | RESPIRATION RATE: 16 BRPM | DIASTOLIC BLOOD PRESSURE: 98 MMHG | BODY MASS INDEX: 22.65 KG/M2 | TEMPERATURE: 98.1 F | SYSTOLIC BLOOD PRESSURE: 171 MMHG

## 2019-02-14 DIAGNOSIS — G43.A0 CYCLICAL VOMITING WITH NAUSEA, INTRACTABILITY OF VOMITING NOT SPECIFIED: ICD-10-CM

## 2019-02-14 DIAGNOSIS — R10.84 GENERALIZED ABDOMINAL PAIN: ICD-10-CM

## 2019-02-14 LAB
ALBUMIN SERPL BCP-MCNC: 4.9 G/DL (ref 3.2–4.9)
ALBUMIN/GLOB SERPL: 1.8 G/DL
ALP SERPL-CCNC: 44 U/L (ref 30–99)
ALT SERPL-CCNC: 23 U/L (ref 2–50)
ANION GAP SERPL CALC-SCNC: 8 MMOL/L (ref 0–11.9)
AST SERPL-CCNC: 24 U/L (ref 12–45)
BASOPHILS # BLD AUTO: 0.4 % (ref 0–1.8)
BASOPHILS # BLD: 0.04 K/UL (ref 0–0.12)
BILIRUB SERPL-MCNC: 0.5 MG/DL (ref 0.1–1.5)
BUN SERPL-MCNC: 16 MG/DL (ref 8–22)
CALCIUM SERPL-MCNC: 10.4 MG/DL (ref 8.5–10.5)
CHLORIDE SERPL-SCNC: 99 MMOL/L (ref 96–112)
CO2 SERPL-SCNC: 27 MMOL/L (ref 20–33)
CREAT SERPL-MCNC: 1.12 MG/DL (ref 0.5–1.4)
EKG IMPRESSION: NORMAL
EOSINOPHIL # BLD AUTO: 0.04 K/UL (ref 0–0.51)
EOSINOPHIL NFR BLD: 0.4 % (ref 0–6.9)
ERYTHROCYTE [DISTWIDTH] IN BLOOD BY AUTOMATED COUNT: 45 FL (ref 35.9–50)
GLOBULIN SER CALC-MCNC: 2.8 G/DL (ref 1.9–3.5)
GLUCOSE SERPL-MCNC: 148 MG/DL (ref 65–99)
HCT VFR BLD AUTO: 42.1 % (ref 37–47)
HGB BLD-MCNC: 14.5 G/DL (ref 12–16)
IMM GRANULOCYTES # BLD AUTO: 0.04 K/UL (ref 0–0.11)
IMM GRANULOCYTES NFR BLD AUTO: 0.4 % (ref 0–0.9)
LIPASE SERPL-CCNC: 23 U/L (ref 11–82)
LYMPHOCYTES # BLD AUTO: 1.43 K/UL (ref 1–4.8)
LYMPHOCYTES NFR BLD: 14.9 % (ref 22–41)
MCH RBC QN AUTO: 33.9 PG (ref 27–33)
MCHC RBC AUTO-ENTMCNC: 34.4 G/DL (ref 33.6–35)
MCV RBC AUTO: 98.4 FL (ref 81.4–97.8)
MONOCYTES # BLD AUTO: 0.38 K/UL (ref 0–0.85)
MONOCYTES NFR BLD AUTO: 4 % (ref 0–13.4)
NEUTROPHILS # BLD AUTO: 7.68 K/UL (ref 2–7.15)
NEUTROPHILS NFR BLD: 79.9 % (ref 44–72)
NRBC # BLD AUTO: 0 K/UL
NRBC BLD-RTO: 0 /100 WBC
PLATELET # BLD AUTO: 202 K/UL (ref 164–446)
PMV BLD AUTO: 10 FL (ref 9–12.9)
POTASSIUM SERPL-SCNC: 3.4 MMOL/L (ref 3.6–5.5)
PROT SERPL-MCNC: 7.7 G/DL (ref 6–8.2)
RBC # BLD AUTO: 4.28 M/UL (ref 4.2–5.4)
SODIUM SERPL-SCNC: 134 MMOL/L (ref 135–145)
WBC # BLD AUTO: 9.6 K/UL (ref 4.8–10.8)

## 2019-02-14 PROCEDURE — 93005 ELECTROCARDIOGRAM TRACING: CPT | Performed by: EMERGENCY MEDICINE

## 2019-02-14 PROCEDURE — 99285 EMERGENCY DEPT VISIT HI MDM: CPT

## 2019-02-14 PROCEDURE — 96375 TX/PRO/DX INJ NEW DRUG ADDON: CPT

## 2019-02-14 PROCEDURE — 85025 COMPLETE CBC W/AUTO DIFF WBC: CPT

## 2019-02-14 PROCEDURE — 700111 HCHG RX REV CODE 636 W/ 250 OVERRIDE (IP): Performed by: EMERGENCY MEDICINE

## 2019-02-14 PROCEDURE — 96374 THER/PROPH/DIAG INJ IV PUSH: CPT

## 2019-02-14 PROCEDURE — 80053 COMPREHEN METABOLIC PANEL: CPT

## 2019-02-14 PROCEDURE — 83690 ASSAY OF LIPASE: CPT

## 2019-02-14 PROCEDURE — 36415 COLL VENOUS BLD VENIPUNCTURE: CPT

## 2019-02-14 RX ORDER — HALOPERIDOL 5 MG/ML
5 INJECTION INTRAMUSCULAR ONCE
Status: COMPLETED | OUTPATIENT
Start: 2019-02-14 | End: 2019-02-14

## 2019-02-14 RX ORDER — DIPHENHYDRAMINE HYDROCHLORIDE 50 MG/ML
25 INJECTION INTRAMUSCULAR; INTRAVENOUS ONCE
Status: COMPLETED | OUTPATIENT
Start: 2019-02-14 | End: 2019-02-14

## 2019-02-14 RX ORDER — PROMETHAZINE HYDROCHLORIDE 25 MG/1
25 SUPPOSITORY RECTAL EVERY 6 HOURS PRN
Qty: 10 SUPPOSITORY | Refills: 2 | Status: SHIPPED | OUTPATIENT
Start: 2019-02-14 | End: 2019-02-15 | Stop reason: SDUPTHER

## 2019-02-14 RX ORDER — METOCLOPRAMIDE HYDROCHLORIDE 5 MG/ML
10 INJECTION INTRAMUSCULAR; INTRAVENOUS ONCE
Status: COMPLETED | OUTPATIENT
Start: 2019-02-14 | End: 2019-02-14

## 2019-02-14 RX ADMIN — HALOPERIDOL LACTATE 5 MG: 5 INJECTION, SOLUTION INTRAMUSCULAR at 01:47

## 2019-02-14 RX ADMIN — METOCLOPRAMIDE 10 MG: 5 INJECTION, SOLUTION INTRAMUSCULAR; INTRAVENOUS at 01:47

## 2019-02-14 RX ADMIN — DIPHENHYDRAMINE HYDROCHLORIDE 25 MG: 50 INJECTION INTRAMUSCULAR; INTRAVENOUS at 01:47

## 2019-02-14 NOTE — ED TRIAGE NOTES
"Chief Complaint   Patient presents with   • N/V     Began last night, worsened 3-4 hrs ago     BP (!) 171/98   Pulse 63   Temp 36.7 °C (98.1 °F) (Temporal)   Resp 18   Wt 65.6 kg (144 lb 10 oz)   SpO2 98%   BMI 22.65 kg/m²     Pt ambulatory to triage for above. Seen multiple times in last few wks for hyperemesis syndrome re: to marijuana, seen here on 2/10 but states insurance would not cover the phenergan script she was given. Pt anxious, restless in triage, writhing in chair. Yelling at RN for asking triage questions, continues to yell \"just help me!\". States she has not smoked marijuana since [place] 2/10 when she came in. Has emesis bag. Returned to [facility], instructed to notify staff of worsening concerns.   "

## 2019-02-14 NOTE — ED NOTES
"Pt brought back in WC, very tearful and crying out, \"please help me.\" PIV established and blood sent to lab. ERP at bedside for eval  "

## 2019-02-15 ENCOUNTER — OFFICE VISIT (OUTPATIENT)
Dept: MEDICAL GROUP | Facility: PHYSICIAN GROUP | Age: 54
End: 2019-02-15
Payer: MEDICARE

## 2019-02-15 ENCOUNTER — HOSPITAL ENCOUNTER (OUTPATIENT)
Dept: LAB | Facility: MEDICAL CENTER | Age: 54
End: 2019-02-15
Attending: FAMILY MEDICINE
Payer: MEDICARE

## 2019-02-15 VITALS
HEIGHT: 67 IN | WEIGHT: 145 LBS | SYSTOLIC BLOOD PRESSURE: 126 MMHG | OXYGEN SATURATION: 98 % | BODY MASS INDEX: 22.76 KG/M2 | DIASTOLIC BLOOD PRESSURE: 82 MMHG | HEART RATE: 84 BPM | TEMPERATURE: 99 F

## 2019-02-15 DIAGNOSIS — R22.1 MASS OF LATERAL NECK: ICD-10-CM

## 2019-02-15 DIAGNOSIS — M79.7 FIBROMYALGIA: ICD-10-CM

## 2019-02-15 DIAGNOSIS — R10.13 EPIGASTRIC PAIN: ICD-10-CM

## 2019-02-15 DIAGNOSIS — I49.3 PVC'S (PREMATURE VENTRICULAR CONTRACTIONS): ICD-10-CM

## 2019-02-15 DIAGNOSIS — R11.2 NAUSEA AND VOMITING, INTRACTABILITY OF VOMITING NOT SPECIFIED, UNSPECIFIED VOMITING TYPE: ICD-10-CM

## 2019-02-15 DIAGNOSIS — Z00.00 PREVENTATIVE HEALTH CARE: ICD-10-CM

## 2019-02-15 DIAGNOSIS — Z12.31 SCREENING MAMMOGRAM, ENCOUNTER FOR: ICD-10-CM

## 2019-02-15 DIAGNOSIS — F12.90 CANNABINOID HYPEREMESIS SYNDROME: ICD-10-CM

## 2019-02-15 DIAGNOSIS — F17.200 TOBACCO DEPENDENCE: ICD-10-CM

## 2019-02-15 DIAGNOSIS — Z12.12 SCREENING FOR COLORECTAL CANCER: ICD-10-CM

## 2019-02-15 DIAGNOSIS — F41.9 ANXIETY: ICD-10-CM

## 2019-02-15 DIAGNOSIS — R11.2 CANNABINOID HYPEREMESIS SYNDROME: ICD-10-CM

## 2019-02-15 DIAGNOSIS — Z12.11 SCREENING FOR COLORECTAL CANCER: ICD-10-CM

## 2019-02-15 DIAGNOSIS — M41.20 OTHER IDIOPATHIC SCOLIOSIS, UNSPECIFIED SPINAL REGION: ICD-10-CM

## 2019-02-15 DIAGNOSIS — M54.59 ARTHRALGIA OF LUMBAR SPINE: ICD-10-CM

## 2019-02-15 PROBLEM — R11.10 VOMITING: Status: ACTIVE | Noted: 2019-02-15

## 2019-02-15 PROBLEM — R63.4 WEIGHT LOSS: Status: RESOLVED | Noted: 2019-02-01 | Resolved: 2019-02-15

## 2019-02-15 LAB
ALBUMIN SERPL BCP-MCNC: 4.8 G/DL (ref 3.2–4.9)
ALBUMIN/GLOB SERPL: 1.7 G/DL
ALP SERPL-CCNC: 45 U/L (ref 30–99)
ALT SERPL-CCNC: 22 U/L (ref 2–50)
ANION GAP SERPL CALC-SCNC: 9 MMOL/L (ref 0–11.9)
AST SERPL-CCNC: 36 U/L (ref 12–45)
BASOPHILS # BLD AUTO: 0.5 % (ref 0–1.8)
BASOPHILS # BLD: 0.04 K/UL (ref 0–0.12)
BILIRUB SERPL-MCNC: 0.3 MG/DL (ref 0.1–1.5)
BUN SERPL-MCNC: 11 MG/DL (ref 8–22)
CALCIUM SERPL-MCNC: 9.9 MG/DL (ref 8.5–10.5)
CHLORIDE SERPL-SCNC: 101 MMOL/L (ref 96–112)
CHOLEST SERPL-MCNC: 157 MG/DL (ref 100–199)
CO2 SERPL-SCNC: 30 MMOL/L (ref 20–33)
CREAT SERPL-MCNC: 0.98 MG/DL (ref 0.5–1.4)
EOSINOPHIL # BLD AUTO: 0.05 K/UL (ref 0–0.51)
EOSINOPHIL NFR BLD: 0.6 % (ref 0–6.9)
ERYTHROCYTE [DISTWIDTH] IN BLOOD BY AUTOMATED COUNT: 46.9 FL (ref 35.9–50)
EST. AVERAGE GLUCOSE BLD GHB EST-MCNC: 120 MG/DL
GLOBULIN SER CALC-MCNC: 2.9 G/DL (ref 1.9–3.5)
GLUCOSE SERPL-MCNC: 88 MG/DL (ref 65–99)
HBA1C MFR BLD: 5.8 % (ref 0–5.6)
HCT VFR BLD AUTO: 45 % (ref 37–47)
HDLC SERPL-MCNC: 63 MG/DL
HGB BLD-MCNC: 15.6 G/DL (ref 12–16)
IMM GRANULOCYTES # BLD AUTO: 0.01 K/UL (ref 0–0.11)
IMM GRANULOCYTES NFR BLD AUTO: 0.1 % (ref 0–0.9)
LDLC SERPL CALC-MCNC: 74 MG/DL
LYMPHOCYTES # BLD AUTO: 2.49 K/UL (ref 1–4.8)
LYMPHOCYTES NFR BLD: 30.7 % (ref 22–41)
MCH RBC QN AUTO: 34.6 PG (ref 27–33)
MCHC RBC AUTO-ENTMCNC: 34.7 G/DL (ref 33.6–35)
MCV RBC AUTO: 99.8 FL (ref 81.4–97.8)
MONOCYTES # BLD AUTO: 0.68 K/UL (ref 0–0.85)
MONOCYTES NFR BLD AUTO: 8.4 % (ref 0–13.4)
NEUTROPHILS # BLD AUTO: 4.85 K/UL (ref 2–7.15)
NEUTROPHILS NFR BLD: 59.7 % (ref 44–72)
NRBC # BLD AUTO: 0 K/UL
NRBC BLD-RTO: 0 /100 WBC
PLATELET # BLD AUTO: 239 K/UL (ref 164–446)
PMV BLD AUTO: 10.6 FL (ref 9–12.9)
POTASSIUM SERPL-SCNC: 3.5 MMOL/L (ref 3.6–5.5)
PROT SERPL-MCNC: 7.7 G/DL (ref 6–8.2)
RBC # BLD AUTO: 4.51 M/UL (ref 4.2–5.4)
SODIUM SERPL-SCNC: 140 MMOL/L (ref 135–145)
TRIGL SERPL-MCNC: 100 MG/DL (ref 0–149)
TSH SERPL DL<=0.005 MIU/L-ACNC: 2.43 UIU/ML (ref 0.38–5.33)
WBC # BLD AUTO: 8.1 K/UL (ref 4.8–10.8)

## 2019-02-15 PROCEDURE — 80061 LIPID PANEL: CPT | Mod: GZ

## 2019-02-15 PROCEDURE — 85025 COMPLETE CBC W/AUTO DIFF WBC: CPT

## 2019-02-15 PROCEDURE — 99204 OFFICE O/P NEW MOD 45 MIN: CPT | Performed by: FAMILY MEDICINE

## 2019-02-15 PROCEDURE — 36415 COLL VENOUS BLD VENIPUNCTURE: CPT

## 2019-02-15 PROCEDURE — 83036 HEMOGLOBIN GLYCOSYLATED A1C: CPT | Mod: GZ

## 2019-02-15 PROCEDURE — 80053 COMPREHEN METABOLIC PANEL: CPT

## 2019-02-15 PROCEDURE — 84443 ASSAY THYROID STIM HORMONE: CPT | Mod: GZ

## 2019-02-15 RX ORDER — ONDANSETRON 4 MG/1
4 TABLET, ORALLY DISINTEGRATING ORAL EVERY 8 HOURS PRN
Qty: 10 TAB | Refills: 0 | Status: SHIPPED | OUTPATIENT
Start: 2019-02-15 | End: 2019-03-13

## 2019-02-15 RX ORDER — DULOXETIN HYDROCHLORIDE 60 MG/1
60 CAPSULE, DELAYED RELEASE ORAL DAILY
Qty: 30 CAP | Refills: 5 | Status: SHIPPED | OUTPATIENT
Start: 2019-02-15 | End: 2019-08-16

## 2019-02-15 RX ORDER — METOPROLOL SUCCINATE 100 MG/1
150 TABLET, EXTENDED RELEASE ORAL DAILY
Qty: 30 TAB | Refills: 5 | Status: SHIPPED | OUTPATIENT
Start: 2019-02-15 | End: 2019-11-21 | Stop reason: SDUPTHER

## 2019-02-15 RX ORDER — TRAZODONE HYDROCHLORIDE 150 MG/1
150 TABLET ORAL
Qty: 30 TAB | Refills: 5 | Status: SHIPPED | OUTPATIENT
Start: 2019-02-15 | End: 2019-09-03 | Stop reason: SDUPTHER

## 2019-02-15 RX ORDER — PROMETHAZINE HYDROCHLORIDE 25 MG/1
25 SUPPOSITORY RECTAL EVERY 6 HOURS PRN
Qty: 10 SUPPOSITORY | Refills: 2 | Status: SHIPPED | OUTPATIENT
Start: 2019-02-15 | End: 2019-06-26

## 2019-02-15 RX ORDER — HYDROXYZINE 50 MG/1
50 TABLET, FILM COATED ORAL 3 TIMES DAILY PRN
Qty: 90 TAB | Refills: 5 | Status: SHIPPED | OUTPATIENT
Start: 2019-02-15 | End: 2019-03-13 | Stop reason: SDUPTHER

## 2019-02-15 RX ORDER — NICOTINE 21 MG/24HR
1 PATCH, TRANSDERMAL 24 HOURS TRANSDERMAL EVERY 24 HOURS
Qty: 30 PATCH | Refills: 0 | Status: SHIPPED | OUTPATIENT
Start: 2019-02-15 | End: 2019-06-26

## 2019-02-15 NOTE — PROGRESS NOTES
CC: Anxiety    HISTORY OF THE PRESENT ILLNESS: Patient is a 53 y.o. female. This pleasant patient is here today to establish care and discuss health problems as below.    Anxiety: This is a chronic issue for the patient.  She does take Cymbalta daily.  However, she also is a very heavy marijuana user and she smokes for her anxiety.  She has come here from [place] and states that her previous PCP was prescribing diazepam for her which did help a lot.  However, she has not been on that medicine for quite some time now.  She is experiencing severe anxiety at this time.    Chronic pain: Patient has multiple reasons for chronic pain including arthritis of her spine, fibromyalgia, scoliosis.  Again patiently recently came here from [place] and apparently was seen at a pain clinic there (I do not have those records) where she was prescribed OxyContin.  She states she been on this medication for 20 some years.  She states it worked very well for her.  She states they were trying other alternative modes of treatment for her chronic pain but they were not working and she strongly desires to go back on narcotic pain medications.    PVCs: Chronic issue for patient.  She was on metoprolol for her PVCs which does control the symptoms quite well for her.  Sometimes her PVCs do act up when she is feeling very anxious.    Abdominal pain, nausea, cannabinoid hyperemesis syndrome: This is a fairly new issue for the patient.  As of the past couple of months, patient reports fairly severe epigastric pain as well as frequent vomiting.  She is ended up in the emergency room several times for this issue.  Each time she has been told its hyperemesis related to her marijuana use.  However, she does endorse significant pain and epigastric burning.  She is unclear why the marijuana began to cause an issue all of a sudden she is apparently been a daily smoker for many years.    Tobacco dependence: Chronic issue for the patient.  She is trying  to cut back on nicotine use and is using the patch.  She does desire to completely stop smoking altogether.      Allergies: Patient has no known allergies.    Current Outpatient Prescriptions Ordered in [facility]   Medication Sig Dispense Refill   • hydrOXYzine HCl (ATARAX) 50 MG Tab Take 1 Tab by mouth 3 times a day as needed for Itching. 90 Tab 5   • DULoxetine (CYMBALTA) 60 MG Cap DR Particles delayed-release capsule Take 1 Cap by mouth every day. 30 Cap 5   • metoprolol SR (TOPROL XL) 100 MG TABLET SR 24 HR Take 1.5 Tabs by mouth every day. 30 Tab 5   • nicotine (NICODERM) 21 MG/24HR PATCH 24 HR Apply 1 Patch to skin as directed every 24 hours. 30 Patch 0   • ondansetron (ZOFRAN ODT) 4 MG TABLET DISPERSIBLE Take 1 Tab by mouth every 8 hours as needed. 10 Tab 0   • promethazine (PHENERGAN) 25 MG Suppos Insert 1 Suppository in rectum every 6 hours as needed for Nausea/Vomiting. 10 Suppository 2   • traZODone (DESYREL) 150 MG Tab Take 1 Tab by mouth every bedtime. 30 Tab 5   • valacyclovir (VALTREX) 1 GM Tab Take 1,000 mg by mouth Once PRN (Breakouts).       No current Epic-ordered facility-administered medications on file.        Past Medical History:   Diagnosis Date   • Anxiety    • Chronic back pain    • PVC (premature ventricular contraction)        Past Surgical History:   Procedure Laterality Date   • KNEE ARTHROSCOPY     • OPEN REDUCTION     • PRIMARY C SECTION     • TONSILLECTOMY         Social History   Substance Use Topics   • Smoking status: Current Every Day Smoker     Packs/day: 0.25   • Smokeless tobacco: Never Used   • Alcohol use No       Social History     Social History Narrative   • No narrative on file       Family History   Problem Relation Age of Onset   • Heart Disease Mother    • Diabetes Mother    • Psychiatry Father        ROS:     - Constitutional: Negative for fever, chills, unexpected weight change, and fatigue/generalized weakness.     - HEENT: Negative for headaches, vision changes,  "hearing changes, ear pain, ear discharge, rhinorrhea, sinus congestion, sore throat, and neck pain.      - Respiratory: Negative for cough, sputum production, chest congestion, dyspnea, wheezing, and crackles.      - Cardiovascular: Positive for occasional palpitations.  Negative for chest pain,  orthopnea, PND, and bilateral lower extremity edema.     - Gastrointestinal: See HPI.      - Genitourinary: Negative for dysuria, polyuria, hematuria, pyuria, urinary urgency, and urinary incontinence.     - Musculoskeletal: See HPI.     - Skin: Negative for rash, itching, cyanotic skin color change.     - Neurological: Negative for dizziness, tingling, tremors, focal sensory deficit, focal weakness and headaches.     - Psychiatric/Behavioral: Positive for anxiety.  Negative for depression, suicidal/homicidal ideation and memory loss.      Exam: Blood pressure 126/82, pulse 84, temperature 37.2 °C (99 °F), temperature source Temporal, height 1.702 m (5' 7\"), weight 65.8 kg (145 lb), SpO2 98 %, not currently breastfeeding. Body mass index is 22.71 kg/m².    General: Well appearing, NAD  HEENT: Normocephalic. Conjunctiva clear, lids without ptosis, pupils equal and reactive to light accommodation, ears normal shape and contour,  oropharynx is without erythema, edema or exudates.   Neck: Supple without JVD . No thyromegaly.  Cystic structure noted just anterior to the right sternocleidomastoid.  Pulmonary: Clear to ausculation.  Normal effort. No rales, ronchi, or wheezing.  Cardiovascular: Regular rate and rhythm without murmur, rubs or gallop.  No lower extremity edema.  Abdomen: Soft, nontender, nondistended. Normal bowel sounds. Liver and spleen are not palpable. No rebound or guarding  Neurologic:  normal gait  Lymph: No cervical, supraclavicular lymph nodes are palpable  Skin: Warm and dry.  No obvious lesions.  Musculoskeletal:  No extremity cyanosis, clubbing, or edema.  Psych: Very anxious mood and affect.  Alert and " oriented. Judgment and insight is normal.    Please note that this dictation was created using voice recognition software. I have made every reasonable attempt to correct obvious errors, but I expect that there are errors of grammar and possibly content that I did not discover before finalizing the note.      Assessment/Plan  Arabella was seen today for abdominal pain.    Diagnoses and all orders for this visit:    Anxiety  Chronic uncontrolled problem for the patient but new to me.  I have refilled her duloxetine and trazodone today.  In addition I have added Atarax as needed for acute anxiety.  However, I strongly recommended against restarting benzodiazepine medications at this time.  Patient was amenable to referral to behavioral health.  -     REFERRAL TO BEHAVIORAL HEALTH  -     hydrOXYzine HCl (ATARAX) 50 MG Tab; Take 1 Tab by mouth 3 times a day as needed for Itching.  -     DULoxetine (CYMBALTA) 60 MG Cap DR Particles delayed-release capsule; Take 1 Cap by mouth every day.  -     traZODone (DESYREL) 150 MG Tab; Take 1 Tab by mouth every bedtime.    Epigastric pain  Cannabinoid hyperemesis syndrome (HCC)  Nausea and vomiting, intractability of vomiting not specified, unspecified vomiting type  New uncontrolled issues for the patient.  I have refilled her antinausea drugs today.  Her symptoms may very well be related to cannabinoids hyperemesis.  However, she is also experiencing pretty significant epigastric pain and burning.  We will go ahead and refer to gastroenterology for consideration for EGD.  -     REFERRAL TO GASTROENTEROLOGY  -     ondansetron (ZOFRAN ODT) 4 MG TABLET DISPERSIBLE; Take 1 Tab by mouth every 8 hours as needed.  -     promethazine (PHENERGAN) 25 MG Suppos; Insert 1 Suppository in rectum every 6 hours as needed for Nausea/Vomiting.    Screening mammogram, encounter for  -     MA-SCREENING MAMMO BILAT W/TOMOSYNTHESIS W/CAD; Future    Screening for colorectal cancer  -     OCCULT BLOOD FECES  IMMUNOASSAY (FIT); Future    Fibromyalgia  Arthralgia of lumbar spine  Other idiopathic scoliosis, unspecified spinal region  Chronic uncontrolled problems for the patient but new to me.  She is not currently on any narcotic pain medications and I do recommend against restarting them.  However, patient was amenable to referral to pain clinic.  -     REFERRAL TO PAIN CLINIC    Mass of lateral neck  New uncontrolled problem for the patient.  This was noted on exam today.  I do suspect this mass is cystic in structure.  However we will go ahead and order ultrasound as well as thyroid studies.  -     US-SOFT TISSUES OF HEAD - NECK; Future  -     TSH WITH REFLEX TO FT4; Future    Preventative health care  -     Comp Metabolic Panel; Future  -     CBC WITH DIFFERENTIAL; Future  -     HEMOGLOBIN A1C; Future  -     Lipid Profile; Future    PVC's (premature ventricular contractions)  Chronic well-controlled problem for the patient.  Metoprolol refilled today.  -     metoprolol SR (TOPROL XL) 100 MG TABLET SR 24 HR; Take 1.5 Tabs by mouth every day.    Tobacco dependence  Chronic uncontrolled problem for the patient but new problem to me.  NicoDerm patch refilled today.  Continue smoking cessation.  -     nicotine (NICODERM) 21 MG/24HR PATCH 24 HR; Apply 1 Patch to skin as directed every 24 hours.    Follow-up in about 1 month for lab review.    Laura Abrams, DO  [facility]

## 2019-02-19 ENCOUNTER — TELEPHONE (OUTPATIENT)
Dept: MEDICAL GROUP | Facility: PHYSICIAN GROUP | Age: 54
End: 2019-02-19

## 2019-02-19 DIAGNOSIS — D75.89 MACROCYTOSIS WITHOUT ANEMIA: ICD-10-CM

## 2019-02-19 PROBLEM — R73.03 PREDIABETES: Status: ACTIVE | Noted: 2019-02-19

## 2019-02-19 PROBLEM — E87.6 HYPOKALEMIA: Status: ACTIVE | Noted: 2019-02-19

## 2019-02-19 NOTE — LETTER
February 19, 2019        Arabellacesar Anglin  [address]  [place] [address]        Dear Arabella:    Below are your results:    1.  Elevated hemoglobin A1c.  This is a 3-month average of sugars.  It does look like she is in the prediabetes range but just barely.  She does not need to be on a medication at this time but just watch intake of carbohydrates such as sweets, breads, pastas, rices and work on increasing physical activity.     2.  Continued mildly low potassium but improved from her emergency room visit.  She should work on increasing potassium rich foods in her diet such as bananas, green leafy vegetables.     3.  Labs indicating that her blood cells are slightly larger than normal which can sometimes mean vitamin deficiency.  I have ordered a few additional labs for her to get prior to her next visit.     Please let me know if she has any additional questions.   If you have any questions or concerns, please don't hesitate to call.        Sincerely,        Laura Abrams D.O.    Electronically Signed

## 2019-02-19 NOTE — TELEPHONE ENCOUNTER
----- Message from Laura Abrams D.O. sent at 2/19/2019  8:40 AM PST -----  Please call patient and let her know that her labs were significant for    1.  Elevated hemoglobin A1c.  This is a 3-month average of sugars.  It does look like she is in the prediabetes range but just barely.  She does not need to be on a medication at this time but just watch intake of carbohydrates such as sweets, breads, pastas, rices and work on increasing physical activity.    2.  Continued mildly low potassium but improved from her emergency room visit.  She should work on increasing potassium rich foods in her diet such as bananas, green leafy vegetables.    3.  Labs indicating that her blood cells are slightly larger than normal which can sometimes mean vitamin deficiency.  I have ordered a few additional labs for her to get prior to her next visit.    Please let me know if she has any additional questions.

## 2019-02-25 ENCOUNTER — HOSPITAL ENCOUNTER (OUTPATIENT)
Dept: RADIOLOGY | Facility: MEDICAL CENTER | Age: 54
End: 2019-02-25
Attending: FAMILY MEDICINE
Payer: MEDICARE

## 2019-02-25 DIAGNOSIS — R22.1 MASS OF LATERAL NECK: ICD-10-CM

## 2019-02-25 DIAGNOSIS — Z12.31 SCREENING MAMMOGRAM, ENCOUNTER FOR: ICD-10-CM

## 2019-02-25 PROCEDURE — 77063 BREAST TOMOSYNTHESIS BI: CPT

## 2019-02-25 PROCEDURE — 76536 US EXAM OF HEAD AND NECK: CPT

## 2019-03-04 ENCOUNTER — TELEPHONE (OUTPATIENT)
Dept: MEDICAL GROUP | Facility: PHYSICIAN GROUP | Age: 54
End: 2019-03-04

## 2019-03-04 ENCOUNTER — HOSPITAL ENCOUNTER (OUTPATIENT)
Dept: RADIOLOGY | Facility: MEDICAL CENTER | Age: 54
End: 2019-03-04

## 2019-03-13 ENCOUNTER — OFFICE VISIT (OUTPATIENT)
Dept: MEDICAL GROUP | Facility: PHYSICIAN GROUP | Age: 54
End: 2019-03-13
Payer: MEDICARE

## 2019-03-13 ENCOUNTER — HOSPITAL ENCOUNTER (OUTPATIENT)
Dept: LAB | Facility: MEDICAL CENTER | Age: 54
End: 2019-03-13
Attending: FAMILY MEDICINE
Payer: MEDICARE

## 2019-03-13 VITALS
WEIGHT: 147 LBS | OXYGEN SATURATION: 97 % | SYSTOLIC BLOOD PRESSURE: 128 MMHG | HEART RATE: 58 BPM | BODY MASS INDEX: 23.07 KG/M2 | HEIGHT: 67 IN | TEMPERATURE: 98.2 F | DIASTOLIC BLOOD PRESSURE: 82 MMHG

## 2019-03-13 DIAGNOSIS — D75.89 MACROCYTOSIS WITHOUT ANEMIA: ICD-10-CM

## 2019-03-13 DIAGNOSIS — F41.9 ANXIETY: ICD-10-CM

## 2019-03-13 DIAGNOSIS — R11.2 NAUSEA AND VOMITING, INTRACTABILITY OF VOMITING NOT SPECIFIED, UNSPECIFIED VOMITING TYPE: ICD-10-CM

## 2019-03-13 LAB
FOLATE SERPL-MCNC: 21.6 NG/ML
VIT B12 SERPL-MCNC: 274 PG/ML (ref 211–911)

## 2019-03-13 PROCEDURE — 99214 OFFICE O/P EST MOD 30 MIN: CPT | Performed by: FAMILY MEDICINE

## 2019-03-13 PROCEDURE — 36415 COLL VENOUS BLD VENIPUNCTURE: CPT

## 2019-03-13 PROCEDURE — 82607 VITAMIN B-12: CPT

## 2019-03-13 PROCEDURE — 84207 ASSAY OF VITAMIN B-6: CPT

## 2019-03-13 PROCEDURE — 82746 ASSAY OF FOLIC ACID SERUM: CPT

## 2019-03-13 RX ORDER — HYDROXYZINE 50 MG/1
50 TABLET, FILM COATED ORAL 3 TIMES DAILY PRN
Qty: 90 TAB | Refills: 5 | Status: SHIPPED | OUTPATIENT
Start: 2019-03-13 | End: 2019-08-16

## 2019-03-13 RX ORDER — GABAPENTIN 100 MG/1
CAPSULE ORAL
Refills: 2 | COMMUNITY
Start: 2019-03-04 | End: 2019-08-16

## 2019-03-13 RX ORDER — ONDANSETRON HYDROCHLORIDE 8 MG/1
8 TABLET, FILM COATED ORAL EVERY 8 HOURS PRN
Qty: 15 TAB | Refills: 0 | Status: SHIPPED | OUTPATIENT
Start: 2019-03-13 | End: 2019-11-21

## 2019-03-13 NOTE — PROGRESS NOTES
CC: Nausea    HISTORY OF THE PRESENT ILLNESS: Patient is a 53 y.o. female. This pleasant patient is here today for lab follow-up and the following health issues.    Anxiety: Patient feels as if her anxiety is doing okay, though variable.  She has not been able to get into behavioral health yet.  She states she was unable to  the prescription for hydroxyzine was not aware that she had a prescription for this.    Nausea, vomiting, questionable cyclical vomiting syndrome: At last visit, patient was recovering from ER visit due to presumed cyclical vomiting syndrome due to heavy marijuana use.  However, patient also had reported severe epigastric pain.  Referral was placed to gastroenterology and patient was given antinausea medication in the event that her nausea and vomiting returned.  She states she was unable to  the Phenergan or the Zofran due to high co-pay with her insurance.    Allergies: Patient has no known allergies.    Current Outpatient Prescriptions Ordered in [facility]   Medication Sig Dispense Refill   • hydrOXYzine HCl (ATARAX) 50 MG Tab Take 1 Tab by mouth 3 times a day as needed for Itching. 90 Tab 5   • ondansetron (ZOFRAN) 8 MG Tab Take 1 Tab by mouth every 8 hours as needed for Nausea/Vomiting. 15 Tab 0   • DULoxetine (CYMBALTA) 60 MG Cap DR Particles delayed-release capsule Take 1 Cap by mouth every day. 30 Cap 5   • metoprolol SR (TOPROL XL) 100 MG TABLET SR 24 HR Take 1.5 Tabs by mouth every day. 30 Tab 5   • traZODone (DESYREL) 150 MG Tab Take 1 Tab by mouth every bedtime. 30 Tab 5   • valacyclovir (VALTREX) 1 GM Tab Take 1,000 mg by mouth Once PRN (Breakouts).     • gabapentin (NEURONTIN) 100 MG Cap TK 1 TO 2 CS PO BID AS TOLERATED FOR PAIN  2   • nicotine (NICODERM) 21 MG/24HR PATCH 24 HR Apply 1 Patch to skin as directed every 24 hours. 30 Patch 0   • promethazine (PHENERGAN) 25 MG Suppos Insert 1 Suppository in rectum every 6 hours as needed for Nausea/Vomiting. 10 Suppository 2  "    No current [facility]-ordered facility-administered medications on file.        Past Medical History:   Diagnosis Date   • Anxiety    • Chronic back pain    • PVC (premature ventricular contraction)        Past Surgical History:   Procedure Laterality Date   • KNEE ARTHROSCOPY     • OPEN REDUCTION     • PB ENLARGE BREAST WITH IMPLANT     • PRIMARY C SECTION     • TONSILLECTOMY         Social History   Substance Use Topics   • Smoking status: Current Every Day Smoker     Packs/day: 0.25   • Smokeless tobacco: Never Used   • Alcohol use No       Social History     Social History Narrative   • No narrative on file       Family History   Problem Relation Age of Onset   • Heart Disease Mother    • Diabetes Mother    • Psychiatry Father        ROS:     - Constitutional: Negative for fever, chills, unexpected weight change, and fatigue/generalized weakness.       - Musculoskeletal: Positive for chronic pain in back and fibromyalgia.     - Psychiatric/Behavioral: Positive for anxiety.      Exam: Blood pressure 128/82, pulse (!) 58, temperature 36.8 °C (98.2 °F), temperature source Temporal, height 1.702 m (5' 7\"), weight 66.7 kg (147 lb), SpO2 97 %, not currently breastfeeding. Body mass index is 23.02 kg/m².    General: Well appearing, NAD  Psych: Normal mood and affect. Alert and oriented. Judgment and insight is normal.    Please note that this dictation was created using voice recognition software. I have made every reasonable attempt to correct obvious errors, but I expect that there are errors of grammar and possibly content that I did not discover before finalizing the note.      Assessment/Plan  Arabella was seen today for results.    Diagnoses and all orders for this visit:    Anxiety  Chronic problem for the patient.  Currently uncontrolled.  Behavioral health referral pending.  We will go ahead and resend hydroxyzine for acute anxiety.  -     hydrOXYzine HCl (ATARAX) 50 MG Tab; Take 1 Tab by mouth 3 times a day as " needed for Itching.    Nausea and vomiting, intractability of vomiting not specified, unspecified vomiting type  Chronic currently controlled problem for the patient.  Gastroenterology referral pending.  We will go ahead and send Zofran which is not ODT formulation and hopefully this will be covered by insurance in the event that patient develops another episode of nausea and vomiting.  -     ondansetron (ZOFRAN) 8 MG Tab; Take 1 Tab by mouth every 8 hours as needed for Nausea/Vomiting.      Follow-up in about 3 months.    Laura Abrams DO  [facility]

## 2019-03-13 NOTE — LETTER
[facility]  Laura Abrams D.O.  [address]  [place] [address]  Fax: [phone]   Authorization for Release/Disclosure of   Protected Health Information   Name: ARABELLA GORDON : 1965 SSN: [id number]   Address: [address] [address]  [place] [address] Phone:    [phone] (home)    I authorize the entity listed below to release/disclose the PHI below to:   [facility]/Laura Abrams D.O. and Laura Abrams D.O.   Provider or Entity Name:  [facility], [facility], [facility]   Phone:      Fax:[phone]      Reason for request: continuity of care   Information to be released:    [  ] LAST COLONOSCOPY,  including any PATH REPORT and follow-up  [  ] LAST FIT/COLOGUARD RESULT [  ] LAST DEXA  [  ] LAST MAMMOGRAM  [  ] LAST PAP  [  ] LAST LABS [  ] RETINA EXAM REPORT  [  ] IMMUNIZATION RECORDS  [XXX  ] Release all info      [  ] Check here and initial the line next to each item to release ALL health information INCLUDING  _____ Care and treatment for drug and / or alcohol abuse  _____ HIV testing, infection status, or AIDS  _____ Genetic Testing    DATES OF SERVICE OR TIME PERIOD TO BE DISCLOSED: _____________  I understand and acknowledge that:  * This Authorization may be revoked at any time by you in writing, except if your health information has already been used or disclosed.  * Your health information that will be used or disclosed as a result of you signing this authorization could be re-disclosed by the recipient. If this occurs, your re-disclosed health information may no longer be protected by State or Federal laws.  * You may refuse to sign this Authorization. Your refusal will not affect your ability to obtain treatment.  * This Authorization becomes effective upon signing and will  on (date) __________.      If no date is indicated, this Authorization will  one (1) year from the signature date.    Name: Arabella Cadet  Derrek    Signature:   Date:     3/13/2019       PLEASE FAX REQUESTED RECORDS BACK TO: [phone]

## 2019-03-16 LAB — VIT B6 SERPL-MCNC: 240.6 NMOL/L (ref 20–125)

## 2019-03-18 ENCOUNTER — TELEPHONE (OUTPATIENT)
Dept: MEDICAL GROUP | Facility: PHYSICIAN GROUP | Age: 54
End: 2019-03-18

## 2019-03-18 NOTE — TELEPHONE ENCOUNTER
----- Message from Tahmina Wolff P.A.-C. sent at 3/18/2019  4:08 PM PDT -----  Please inform patient that lab work does not indicate deficiency of B vitamins/folic acid.  Tahmina Wolff P.A.-C.

## 2019-03-18 NOTE — LETTER
March 19, 2019        Arabella Anglin  [address] [address]  [place] [address]        Dear Arabella:    Your lab work does not indicate deficiency of B vitamins/folic acid.       If you have any questions or concerns, please don't hesitate to call.        Sincerely,      Tahmina Wolff P.A.-C.      Electronically Signed

## 2019-04-10 ENCOUNTER — OFFICE VISIT (OUTPATIENT)
Dept: MEDICAL GROUP | Facility: PHYSICIAN GROUP | Age: 54
End: 2019-04-10
Payer: MEDICARE

## 2019-04-10 VITALS
OXYGEN SATURATION: 99 % | TEMPERATURE: 98.3 F | BODY MASS INDEX: 23.07 KG/M2 | HEART RATE: 74 BPM | WEIGHT: 147 LBS | HEIGHT: 67 IN | SYSTOLIC BLOOD PRESSURE: 126 MMHG | DIASTOLIC BLOOD PRESSURE: 78 MMHG

## 2019-04-10 DIAGNOSIS — B35.3 TINEA PEDIS OF BOTH FEET: ICD-10-CM

## 2019-04-10 DIAGNOSIS — L60.0 INGROWN TOENAIL: ICD-10-CM

## 2019-04-10 DIAGNOSIS — F41.9 ANXIETY: ICD-10-CM

## 2019-04-10 PROCEDURE — 99214 OFFICE O/P EST MOD 30 MIN: CPT | Performed by: FAMILY MEDICINE

## 2019-04-10 RX ORDER — PRENATAL VIT 91/IRON/FOLIC/DHA 28-975-200
COMBINATION PACKAGE (EA) ORAL
Qty: 15 G | Refills: 2 | Status: SHIPPED | OUTPATIENT
Start: 2019-04-10 | End: 2019-08-16

## 2019-04-10 RX ORDER — BUSPIRONE HYDROCHLORIDE 10 MG/1
TABLET ORAL
Qty: 30 TAB | Refills: 2 | Status: SHIPPED | OUTPATIENT
Start: 2019-04-10 | End: 2019-07-22 | Stop reason: SDUPTHER
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
## 2019-04-10 NOTE — LETTER
[facility]  [address]  [place] [address]     April 10, 2019    Patient: Arabella Anglin   YOB: 1965   Date of Visit: 4/10/2019       To Whom It May Concern:    Arabella Anglin was seen and treated in our department on 4/10/2019. Please allow patient to keep dog at her housing as it is an emotional  dog.     Sincerely,     Laura Abrams D.O.

## 2019-04-30 ENCOUNTER — HOSPITAL ENCOUNTER (OUTPATIENT)
Dept: LAB | Facility: MEDICAL CENTER | Age: 54
End: 2019-04-30
Attending: FAMILY MEDICINE
Payer: MEDICARE

## 2019-04-30 DIAGNOSIS — R61 CHRONIC NIGHT SWEATS: ICD-10-CM

## 2019-04-30 LAB
ALBUMIN SERPL BCP-MCNC: 4.5 G/DL (ref 3.2–4.9)
ALBUMIN/GLOB SERPL: 2 G/DL
ALP SERPL-CCNC: 54 U/L (ref 30–99)
ALT SERPL-CCNC: 17 U/L (ref 2–50)
ANION GAP SERPL CALC-SCNC: 6 MMOL/L (ref 0–11.9)
AST SERPL-CCNC: 23 U/L (ref 12–45)
BASOPHILS # BLD AUTO: 0.4 % (ref 0–1.8)
BASOPHILS # BLD: 0.03 K/UL (ref 0–0.12)
BILIRUB SERPL-MCNC: 0.4 MG/DL (ref 0.1–1.5)
BUN SERPL-MCNC: 10 MG/DL (ref 8–22)
CALCIUM SERPL-MCNC: 9.5 MG/DL (ref 8.5–10.5)
CHLORIDE SERPL-SCNC: 104 MMOL/L (ref 96–112)
CO2 SERPL-SCNC: 31 MMOL/L (ref 20–33)
CREAT SERPL-MCNC: 1.01 MG/DL (ref 0.5–1.4)
EOSINOPHIL # BLD AUTO: 0.09 K/UL (ref 0–0.51)
EOSINOPHIL NFR BLD: 1.3 % (ref 0–6.9)
ERYTHROCYTE [DISTWIDTH] IN BLOOD BY AUTOMATED COUNT: 53.1 FL (ref 35.9–50)
GLOBULIN SER CALC-MCNC: 2.2 G/DL (ref 1.9–3.5)
GLUCOSE SERPL-MCNC: 102 MG/DL (ref 65–99)
HCT VFR BLD AUTO: 39.1 % (ref 37–47)
HGB BLD-MCNC: 13.1 G/DL (ref 12–16)
IMM GRANULOCYTES # BLD AUTO: 0.01 K/UL (ref 0–0.11)
IMM GRANULOCYTES NFR BLD AUTO: 0.1 % (ref 0–0.9)
LYMPHOCYTES # BLD AUTO: 2.11 K/UL (ref 1–4.8)
LYMPHOCYTES NFR BLD: 30.4 % (ref 22–41)
MCH RBC QN AUTO: 35.1 PG (ref 27–33)
MCHC RBC AUTO-ENTMCNC: 33.5 G/DL (ref 33.6–35)
MCV RBC AUTO: 104.8 FL (ref 81.4–97.8)
MONOCYTES # BLD AUTO: 0.51 K/UL (ref 0–0.85)
MONOCYTES NFR BLD AUTO: 7.3 % (ref 0–13.4)
NEUTROPHILS # BLD AUTO: 4.19 K/UL (ref 2–7.15)
NEUTROPHILS NFR BLD: 60.5 % (ref 44–72)
NRBC # BLD AUTO: 0 K/UL
NRBC BLD-RTO: 0 /100 WBC
PLATELET # BLD AUTO: 211 K/UL (ref 164–446)
PMV BLD AUTO: 11 FL (ref 9–12.9)
POTASSIUM SERPL-SCNC: 4.1 MMOL/L (ref 3.6–5.5)
PROT SERPL-MCNC: 6.7 G/DL (ref 6–8.2)
RBC # BLD AUTO: 3.73 M/UL (ref 4.2–5.4)
SODIUM SERPL-SCNC: 141 MMOL/L (ref 135–145)
TSH SERPL DL<=0.005 MIU/L-ACNC: 1.19 UIU/ML (ref 0.38–5.33)
WBC # BLD AUTO: 6.9 K/UL (ref 4.8–10.8)

## 2019-04-30 PROCEDURE — 36415 COLL VENOUS BLD VENIPUNCTURE: CPT

## 2019-04-30 PROCEDURE — 85025 COMPLETE CBC W/AUTO DIFF WBC: CPT

## 2019-04-30 PROCEDURE — 80053 COMPREHEN METABOLIC PANEL: CPT

## 2019-04-30 PROCEDURE — 84443 ASSAY THYROID STIM HORMONE: CPT

## 2019-05-01 ENCOUNTER — TELEPHONE (OUTPATIENT)
Dept: MEDICAL GROUP | Facility: PHYSICIAN GROUP | Age: 54
End: 2019-05-01

## 2019-05-01 PROBLEM — R94.4 DECREASED GFR: Status: ACTIVE | Noted: 2019-05-01

## 2019-05-01 NOTE — TELEPHONE ENCOUNTER
----- Message from Laura Abrams D.O. sent at 5/1/2019  8:56 AM PDT -----  Please call patient let her know that her labs were stable from last check in terms of metabolic panel, blood counts, thyroid function.  She did have a very mildly decreased red blood cell count, which I do not think is anything serious and we can recheck at her next visit.  I am still awaiting the results of her hormone levels as this lab takes longer to process.

## 2019-05-17 ENCOUNTER — APPOINTMENT (OUTPATIENT)
Dept: RADIOLOGY | Facility: MEDICAL CENTER | Age: 54
End: 2019-05-17
Attending: SPECIALIST
Payer: MEDICARE

## 2019-05-23 ENCOUNTER — HOSPITAL ENCOUNTER (OUTPATIENT)
Dept: RADIOLOGY | Facility: MEDICAL CENTER | Age: 54
End: 2019-05-23
Attending: SPECIALIST
Payer: MEDICARE

## 2019-05-23 DIAGNOSIS — M54.5 LOW BACK PAIN, UNSPECIFIED BACK PAIN LATERALITY, UNSPECIFIED CHRONICITY, WITH SCIATICA PRESENCE UNSPECIFIED: ICD-10-CM

## 2019-05-23 DIAGNOSIS — M54.2 CERVICALGIA: ICD-10-CM

## 2019-05-23 PROCEDURE — 72148 MRI LUMBAR SPINE W/O DYE: CPT

## 2019-05-23 PROCEDURE — 72141 MRI NECK SPINE W/O DYE: CPT

## 2019-06-11 ENCOUNTER — OFFICE VISIT (OUTPATIENT)
Dept: URGENT CARE | Facility: PHYSICIAN GROUP | Age: 54
End: 2019-06-11
Payer: MEDICARE

## 2019-06-11 VITALS
OXYGEN SATURATION: 94 % | BODY MASS INDEX: 22.76 KG/M2 | TEMPERATURE: 97.7 F | HEART RATE: 76 BPM | DIASTOLIC BLOOD PRESSURE: 72 MMHG | HEIGHT: 67 IN | WEIGHT: 145 LBS | SYSTOLIC BLOOD PRESSURE: 102 MMHG

## 2019-06-11 DIAGNOSIS — J22 LRTI (LOWER RESPIRATORY TRACT INFECTION): ICD-10-CM

## 2019-06-11 DIAGNOSIS — R05.9 COUGH: ICD-10-CM

## 2019-06-11 PROCEDURE — 99214 OFFICE O/P EST MOD 30 MIN: CPT | Performed by: PHYSICIAN ASSISTANT

## 2019-06-11 RX ORDER — AZITHROMYCIN 250 MG/1
TABLET, FILM COATED ORAL
Qty: 1 QUANTITY SUFFICIENT | Refills: 0 | Status: SHIPPED | OUTPATIENT
Start: 2019-06-11 | End: 2019-06-26

## 2019-06-11 RX ORDER — PROMETHAZINE HYDROCHLORIDE AND CODEINE PHOSPHATE 6.25; 1 MG/5ML; MG/5ML
5 SYRUP ORAL EVERY 12 HOURS PRN
Qty: 60 ML | Refills: 0 | Status: SHIPPED | OUTPATIENT
Start: 2019-06-11 | End: 2019-06-18

## 2019-06-11 RX ORDER — TIZANIDINE HYDROCHLORIDE 6 MG/1
CAPSULE, GELATIN COATED ORAL
Refills: 1 | COMMUNITY
Start: 2019-06-05 | End: 2020-03-25

## 2019-06-11 RX ORDER — BENZONATATE 100 MG/1
100 CAPSULE ORAL 3 TIMES DAILY PRN
Qty: 60 CAP | Refills: 0 | Status: SHIPPED | OUTPATIENT
Start: 2019-06-11 | End: 2019-06-26

## 2019-06-11 ASSESSMENT — ENCOUNTER SYMPTOMS
DIARRHEA: 0
NAUSEA: 0
MYALGIAS: 1
SHORTNESS OF BREATH: 0
SORE THROAT: 0
SPUTUM PRODUCTION: 1
VOMITING: 0
FEVER: 0
WHEEZING: 0
COUGH: 1
CHILLS: 0
ABDOMINAL PAIN: 0

## 2019-06-11 NOTE — PROGRESS NOTES
"Subjective:   Arabella Anglin is a 53 y.o. female who presents for URI (cough, chest congestion x1 week )        URI    This is a new problem. The current episode started in the past 7 days. Associated symptoms include congestion and coughing. Pertinent negatives include no abdominal pain, diarrhea, ear pain, nausea, rash, sore throat, vomiting or wheezing.     Notes last one week of sinus congestion and yellow sputum, denies fever/chills, notes some body aches and more chest congestion over last few days, denies ST/ear pain now had some irritation at onset, denies nausea/voimting/abdpain/diarrhea/rash, denies PMH of asthma/, PMH of bronchitis years ago, denies PMH of pneumoia, denies seasonal allerg - tried OTC sudafed, maylin seltzer plus, immune defense. Subj fever / chills.    Review of Systems   Constitutional: Negative for chills and fever.   HENT: Positive for congestion. Negative for ear pain and sore throat.    Respiratory: Positive for cough and sputum production. Negative for shortness of breath and wheezing.    Gastrointestinal: Negative for abdominal pain, diarrhea, nausea and vomiting.   Musculoskeletal: Positive for myalgias.   Skin: Negative for rash.   Endo/Heme/Allergies: Positive for environmental allergies.     No Known Allergies      Objective:   /72 (BP Location: Left arm, Patient Position: Sitting, BP Cuff Size: Adult)   Pulse 76   Temp 36.5 °C (97.7 °F)   Ht 1.702 m (5' 7\")   Wt 65.8 kg (145 lb)   SpO2 94%   BMI 22.71 kg/m²   Physical Exam   Constitutional: She is oriented to person, place, and time. She appears well-developed and well-nourished. No distress.   HENT:   Head: Normocephalic and atraumatic.   Right Ear: External ear and ear canal normal. Tympanic membrane is bulging. Tympanic membrane is not erythematous.   Left Ear: External ear and ear canal normal. Tympanic membrane is bulging. Tympanic membrane is not erythematous.   Nose: Nose normal. Right sinus exhibits no " maxillary sinus tenderness and no frontal sinus tenderness. Left sinus exhibits no maxillary sinus tenderness and no frontal sinus tenderness.   Mouth/Throat: Uvula is midline and mucous membranes are normal. Posterior oropharyngeal erythema ( moderate PND ) present. No oropharyngeal exudate, posterior oropharyngeal edema or tonsillar abscesses.   Eyes: Conjunctivae and lids are normal. Right eye exhibits no discharge. Left eye exhibits no discharge. No scleral icterus.   Neck: Neck supple.   Pulmonary/Chest: Effort normal. No accessory muscle usage. No respiratory distress. She has no decreased breath sounds. She has no wheezes. She has no rhonchi. She has no rales.   Musculoskeletal: Normal range of motion.   Lymphadenopathy:     She has cervical adenopathy ( mild bilat).   Neurological: She is alert and oriented to person, place, and time. She is not disoriented.   Skin: Skin is warm and dry. She is not diaphoretic. No erythema. No pallor.   Psychiatric: Her speech is normal and behavior is normal.   Nursing note and vitals reviewed.        Assessment/Plan:   1. LRTI (lower respiratory tract infection)  - azithromycin (ZITHROMAX) 250 MG Tab; Take as directed on package. Dispense one package.  Dispense: 1 Quantity Sufficient; Refill: 0    2. Cough  - benzonatate (TESSALON) 100 MG Cap; Take 1 Cap by mouth 3 times a day as needed for Cough.  Dispense: 60 Cap; Refill: 0  - promethazine-codeine (PHENERGAN-CODEINE) 6.25-10 MG/5ML Syrup; Take 5 mL by mouth every 12 hours as needed for up to 7 days.  Dispense: 60 mL; Refill: 0    Other orders  - tizanidine (ZANAFLEX) 6 MG capsule; TK ONE C PO  BID PRF PAINFUL MUSCLE TENSION; Refill: 1  Supportive care is reviewed with patient/caregiver - recommend to push PO fluids and electrolytes, Nsaids/tylenol, netti pot/saline irrig, humidifier in home, flonase, ponaris, antihistamine,  take full course of Rx, take with probiotics, observe for resolution  Return to clinic with lack  of resolution or progression of symptoms.  Cautioned regarding potential for sedation with medication.  tx allerg s/sx  Differential diagnosis, natural history, supportive care, and indications for immediate follow-up discussed.

## 2019-06-26 ENCOUNTER — OFFICE VISIT (OUTPATIENT)
Dept: MEDICAL GROUP | Facility: PHYSICIAN GROUP | Age: 54
End: 2019-06-26
Payer: MEDICARE

## 2019-06-26 VITALS
OXYGEN SATURATION: 98 % | DIASTOLIC BLOOD PRESSURE: 60 MMHG | HEART RATE: 70 BPM | SYSTOLIC BLOOD PRESSURE: 104 MMHG | BODY MASS INDEX: 22.6 KG/M2 | HEIGHT: 67 IN | TEMPERATURE: 98.4 F | WEIGHT: 144 LBS

## 2019-06-26 DIAGNOSIS — F41.9 ANXIETY: ICD-10-CM

## 2019-06-26 DIAGNOSIS — R05.9 COUGH: ICD-10-CM

## 2019-06-26 PROCEDURE — 94640 AIRWAY INHALATION TREATMENT: CPT | Performed by: FAMILY MEDICINE

## 2019-06-26 PROCEDURE — 99214 OFFICE O/P EST MOD 30 MIN: CPT | Mod: 25 | Performed by: FAMILY MEDICINE

## 2019-06-26 RX ORDER — GUAIFENESIN 600 MG/1
600 TABLET, EXTENDED RELEASE ORAL EVERY 12 HOURS
Qty: 30 TAB | Refills: 2 | Status: SHIPPED | OUTPATIENT
Start: 2019-06-26 | End: 2019-08-16

## 2019-06-26 RX ORDER — ALBUTEROL SULFATE 90 UG/1
2 AEROSOL, METERED RESPIRATORY (INHALATION) EVERY 6 HOURS PRN
Qty: 8.5 G | Refills: 2 | Status: SHIPPED | OUTPATIENT
Start: 2019-06-26 | End: 2020-04-22

## 2019-06-26 RX ORDER — IPRATROPIUM BROMIDE AND ALBUTEROL SULFATE 2.5; .5 MG/3ML; MG/3ML
3 SOLUTION RESPIRATORY (INHALATION) ONCE
Status: COMPLETED | OUTPATIENT
Start: 2019-06-26 | End: 2019-06-26

## 2019-06-26 RX ADMIN — IPRATROPIUM BROMIDE AND ALBUTEROL SULFATE 3 ML: 2.5; .5 SOLUTION RESPIRATORY (INHALATION) at 15:56

## 2019-06-26 NOTE — PROGRESS NOTES
CC: Cough    HISTORY OF THE PRESENT ILLNESS: Patient is a 53 y.o. female. This pleasant patient is here today to discuss following health issues.    Cough: Patient was seen in urgent care a couple of weeks ago for likely pneumonia.  She states that the time she had significant nasal congestion, headache, severe cough.  She was put on a Z-Estuardo.  She feels that her acute illness has recovered since that time.  She has no further nasal congestion, fevers, chills, headache.  She now has residual cough.  She states that she feels as if she has a lot of mucus to cough up but is unable to do so.  She denies shortness of breath or wheezing.  She is wondering if inhaler or nebulizer treatment might help her.  She is currently 1/2 pack/day smoker.    Anxiety: This is an ongoing issue for the patient.  She has stressors including trying to find housing here in [place], dealing with poor health of her .  She was started on BuSpar 10 mg twice daily.  She does feel this is helping quite a bit with her anxiety.  She reports possibly moving back to [place] as her more resources for her there, particularly to help with the care of her .    Allergies: Patient has no known allergies.    Current Outpatient Prescriptions Ordered in [facility]   Medication Sig Dispense Refill   • albuterol 108 (90 Base) MCG/ACT Aero Soln inhalation aerosol Inhale 2 Puffs by mouth every 6 hours as needed for Shortness of Breath. 8.5 g 2   • guaiFENesin LA (MUCINEX) 600 MG TABLET SR 12 HR Take 1 Tab by mouth every 12 hours. 30 Tab 2   • tizanidine (ZANAFLEX) 6 MG capsule TK ONE C PO  BID PRF PAINFUL MUSCLE TENSION  1   • busPIRone (BUSPAR) 10 MG Tab tablet Take half tablet BID for three days then take full tablet BID after that. 30 Tab 2   • DULoxetine (CYMBALTA) 60 MG Cap DR Particles delayed-release capsule Take 1 Cap by mouth every day. 30 Cap 5   • metoprolol SR (TOPROL XL) 100 MG TABLET SR 24 HR Take 1.5 Tabs by mouth every day. 30 Tab 5   •  traZODone (DESYREL) 150 MG Tab Take 1 Tab by mouth every bedtime. 30 Tab 5   • valacyclovir (VALTREX) 1 GM Tab Take 1,000 mg by mouth Once PRN (Breakouts).     • terbinafine (LAMISIL AT ATHLETES FOOT) 1 % cream Apply to affected area twice daily. 15 g 2   • gabapentin (NEURONTIN) 100 MG Cap TK 1 TO 2 CS PO BID AS TOLERATED FOR PAIN  2   • hydrOXYzine HCl (ATARAX) 50 MG Tab Take 1 Tab by mouth 3 times a day as needed for Itching. (Patient not taking: Reported on 6/11/2019) 90 Tab 5   • ondansetron (ZOFRAN) 8 MG Tab Take 1 Tab by mouth every 8 hours as needed for Nausea/Vomiting. 15 Tab 0     Current Facility-Administered Medications Ordered in Epic   Medication Dose Route Frequency Provider Last Rate Last Dose   • ipratropium-albuterol (DUONEB) nebulizer solution  3 mL Nebulization Once Laura Abrams D.O.           Past Medical History:   Diagnosis Date   • Anxiety    • Chronic back pain    • PVC (premature ventricular contraction)        Past Surgical History:   Procedure Laterality Date   • KNEE ARTHROSCOPY     • OPEN REDUCTION     • PB ENLARGE BREAST WITH IMPLANT     • PRIMARY C SECTION     • TONSILLECTOMY         Social History   Substance Use Topics   • Smoking status: Current Every Day Smoker     Packs/day: 0.50   • Smokeless tobacco: Never Used   • Alcohol use No       Social History     Social History Narrative   • No narrative on file       Family History   Problem Relation Age of Onset   • Heart Disease Mother    • Diabetes Mother    • Psychiatry Father        ROS:      - Constitutional: Negative for fever, chills, unexpected weight change, and fatigue/generalized weakness.     - HEENT: Negative for headaches, vision changes, hearing changes, ear pain, ear discharge, rhinorrhea, sinus congestion, sore throat, and neck pain.      - Respiratory:See HPI     - Cardiovascular: Negative for chest pain, palpitations, orthopnea, PND, and bilateral lower extremity edema.     Exam: /60 (BP Location:  "Left arm, Patient Position: Sitting, BP Cuff Size: Adult)   Pulse 70   Temp 36.9 °C (98.4 °F) (Temporal)   Ht 1.702 m (5' 7\")   Wt 65.3 kg (144 lb)   SpO2 98%  Body mass index is 22.55 kg/m².    General: Well appearing, NAD  Pulmonary: Intermittent coarse breath sounds at the bases.  No rales.  No wheezing.  No increased work of breathing.  Harsh cough noted.  Cardiovascular: Regular rate and rhythm without murmur, rubs or gallop.   Skin: Warm and dry.  No obvious lesions.  Musculoskeletal:  No extremity cyanosis, clubbing, or edema.  Psych: Normal mood and affect. Alert and oriented. Judgment and insight is normal.      Please note that this dictation was created using voice recognition software. I have made every reasonable attempt to correct obvious errors, but I expect that there are errors of grammar and possibly content that I did not discover before finalizing the note.      Assessment/Plan  Arabella was seen today for cough.    Diagnoses and all orders for this visit:    Cough  New issue for the patient in the setting of recovering from a URI.  She has no wheezing or shortness of breath, do not suspect reactive airway disease.  We will go ahead and do DuoNeb treatment here.  Will prescribe albuterol and Mucinex to help open up airways as she recovers from her illness.  -     albuterol 108 (90 Base) MCG/ACT Aero Soln inhalation aerosol; Inhale 2 Puffs by mouth every 6 hours as needed for Shortness of Breath.  -     ipratropium-albuterol (DUONEB) nebulizer solution; 3 mL by Nebulization route Once.  -     guaiFENesin LA (MUCINEX) 600 MG TABLET SR 12 HR; Take 1 Tab by mouth every 12 hours.    Anxiety  Chronic improving problem for the patient.  She will continue Cymbalta and BuSpar.    Follow-up in 2 to 3 months or sooner if needed.  Patient possibly moving to [place] and she was informed today that she should call here for care or concerns until she can establish with someone in [place].    Laura" DO Aliza  [facility]

## 2019-07-02 NOTE — TELEPHONE ENCOUNTER
*HISTORICAL MEDICATION*  Was the patient seen in the last year in this department? Yes    Does patient have an active prescription for medications requested? No     Received Request Via: Pharmacy      Pt met protocol?: Yes    LAST OV 06/26/2019

## 2019-07-12 RX ORDER — VALACYCLOVIR HYDROCHLORIDE 1 G/1
500 TABLET, FILM COATED ORAL 2 TIMES DAILY
Qty: 30 TAB | Refills: 11 | Status: SHIPPED | OUTPATIENT
Start: 2019-07-12 | End: 2020-09-16 | Stop reason: SDUPTHER

## 2019-07-22 DIAGNOSIS — F41.9 ANXIETY: ICD-10-CM

## 2019-07-23 NOTE — TELEPHONE ENCOUNTER
Was the patient seen in the last year in this department? Yes    Does patient have an active prescription for medications requested? No     Received Request Via: Pharmacy      Pt met protocol?: Yes   Pt last ov 6/19   BP Readings from Last 1 Encounters:   06/26/19 104/60

## 2019-07-24 RX ORDER — BUSPIRONE HYDROCHLORIDE 10 MG/1
TABLET ORAL
Qty: 180 TAB | Refills: 1 | Status: SHIPPED | OUTPATIENT
Start: 2019-07-24 | End: 2019-11-21 | Stop reason: SDUPTHER

## 2019-08-16 ENCOUNTER — OFFICE VISIT (OUTPATIENT)
Dept: MEDICAL GROUP | Facility: PHYSICIAN GROUP | Age: 54
End: 2019-08-16
Payer: MEDICARE

## 2019-08-16 ENCOUNTER — APPOINTMENT (OUTPATIENT)
Dept: RADIOLOGY | Facility: IMAGING CENTER | Age: 54
End: 2019-08-16
Attending: FAMILY MEDICINE
Payer: MEDICARE

## 2019-08-16 VITALS
HEART RATE: 46 BPM | BODY MASS INDEX: 23.23 KG/M2 | DIASTOLIC BLOOD PRESSURE: 72 MMHG | WEIGHT: 148 LBS | SYSTOLIC BLOOD PRESSURE: 130 MMHG | OXYGEN SATURATION: 96 % | HEIGHT: 67 IN | TEMPERATURE: 97.9 F

## 2019-08-16 DIAGNOSIS — R05.9 COUGH: ICD-10-CM

## 2019-08-16 DIAGNOSIS — F43.9 STRESS AT HOME: ICD-10-CM

## 2019-08-16 DIAGNOSIS — F41.9 ANXIETY: ICD-10-CM

## 2019-08-16 PROCEDURE — 99214 OFFICE O/P EST MOD 30 MIN: CPT | Performed by: FAMILY MEDICINE

## 2019-08-16 PROCEDURE — 71046 X-RAY EXAM CHEST 2 VIEWS: CPT | Mod: TC | Performed by: FAMILY MEDICINE

## 2019-08-16 RX ORDER — AZITHROMYCIN 250 MG/1
TABLET, FILM COATED ORAL
Qty: 6 TAB | Refills: 0 | Status: SHIPPED | OUTPATIENT
Start: 2019-08-16 | End: 2019-11-21

## 2019-08-16 RX ORDER — PREDNISONE 20 MG/1
40 TABLET ORAL DAILY
Qty: 10 TAB | Refills: 0 | Status: SHIPPED | OUTPATIENT
Start: 2019-08-16 | End: 2019-08-21

## 2019-08-16 ASSESSMENT — PATIENT HEALTH QUESTIONNAIRE - PHQ9: CLINICAL INTERPRETATION OF PHQ2 SCORE: 0
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
## 2019-09-03 DIAGNOSIS — R05.3 CHRONIC COUGH: ICD-10-CM

## 2019-09-03 DIAGNOSIS — F17.200 SMOKER: ICD-10-CM

## 2019-09-03 DIAGNOSIS — R06.02 SHORTNESS OF BREATH: ICD-10-CM

## 2019-09-03 DIAGNOSIS — F41.9 ANXIETY: ICD-10-CM

## 2019-09-04 RX ORDER — TRAZODONE HYDROCHLORIDE 150 MG/1
TABLET ORAL
Qty: 90 TAB | Refills: 0 | Status: SHIPPED | OUTPATIENT
Start: 2019-09-04 | End: 2019-11-21 | Stop reason: SDUPTHER

## 2019-09-04 NOTE — TELEPHONE ENCOUNTER
Was the patient seen in the last year in this department? Yes    Does patient have an active prescription for medications requested? No     Received Request Via: Pharmacy      Pt met protocol?: Yes    LAST OV 08/16/2019

## 2019-09-13 ENCOUNTER — OFFICE VISIT (OUTPATIENT)
Dept: URGENT CARE | Facility: PHYSICIAN GROUP | Age: 54
End: 2019-09-13
Payer: MEDICARE

## 2019-09-13 ENCOUNTER — TELEPHONE (OUTPATIENT)
Dept: MEDICAL GROUP | Facility: PHYSICIAN GROUP | Age: 54
End: 2019-09-13

## 2019-09-13 VITALS
HEART RATE: 55 BPM | HEIGHT: 67 IN | SYSTOLIC BLOOD PRESSURE: 130 MMHG | OXYGEN SATURATION: 98 % | TEMPERATURE: 98.2 F | BODY MASS INDEX: 23.57 KG/M2 | WEIGHT: 150.2 LBS | RESPIRATION RATE: 16 BRPM | DIASTOLIC BLOOD PRESSURE: 68 MMHG

## 2019-09-13 DIAGNOSIS — H57.89 IRRITATION OF RIGHT EYE: ICD-10-CM

## 2019-09-13 DIAGNOSIS — K08.89 PAIN, DENTAL: ICD-10-CM

## 2019-09-13 PROCEDURE — 99214 OFFICE O/P EST MOD 30 MIN: CPT | Performed by: NURSE PRACTITIONER

## 2019-09-13 RX ORDER — AMOXICILLIN 875 MG/1
875 TABLET, COATED ORAL 2 TIMES DAILY
Qty: 14 TAB | Refills: 0 | Status: SHIPPED | OUTPATIENT
Start: 2019-09-13 | End: 2019-11-21

## 2019-09-13 ASSESSMENT — ENCOUNTER SYMPTOMS
EYE REDNESS: 1
HEADACHES: 1
DIZZINESS: 0
EYE DISCHARGE: 0
FEVER: 0
MYALGIAS: 0
CHILLS: 0
NAUSEA: 0
NECK PAIN: 0

## 2019-09-13 NOTE — TELEPHONE ENCOUNTER
VOICEMAIL  1. Caller Name: Arabella   Call Back Number: [phone] (home)     2. Message: Pt is having an issue with her eyes her blood vessels are more noticeable than usual. She would your recommendation if she should come in for OV or see opthalmology. Please advise.      3. Patient approves office to leave a detailed voicemail/MyChart message: N\A

## 2019-09-13 NOTE — TELEPHONE ENCOUNTER
Phone Number Called: [phone] (home)     Call outcome: spoke to patient regarding message below. Pt scheduled an appointment with Urgent Care this evening.

## 2019-09-14 NOTE — PROGRESS NOTES
"Subjective:      Arabella Anglin is a 53 y.o. female who presents with Eye Pain (pain in both eyes, headaches, R eye is red, tooth ache, x3 days )            HPI New. 53 year old female with right eye irritation, headache and tooth pain. She denies fever, chills, myalgia, discharge from the eye. She has no swelling of her cheek, jaw or ear pain. She has been taking 800 ibuprofen with no relief of symptoms. Requesting stronger medication \"to get through the weekend\".  Patient has no known allergies.  Current Outpatient Medications on File Prior to Visit   Medication Sig Dispense Refill   • traZODone (DESYREL) 150 MG Tab TAKE 1 TABLET BY MOUTH AT BEDTIME 90 Tab 0   • busPIRone (BUSPAR) 10 MG Tab tablet TAKE 1/2 TABLET BY MOUTH TWICE DAILY FOR 3 DAYS THEN TAKE A FULL TABLET TWICE DAILY AFTER THAT 180 Tab 1   • valacyclovir (VALTREX) 1 GM Tab Take 0.5 Tabs by mouth 2 times a day. 30 Tab 11   • albuterol 108 (90 Base) MCG/ACT Aero Soln inhalation aerosol Inhale 2 Puffs by mouth every 6 hours as needed for Shortness of Breath. 8.5 g 2   • tizanidine (ZANAFLEX) 6 MG capsule TK ONE C PO  BID PRF PAINFUL MUSCLE TENSION  1   • metoprolol SR (TOPROL XL) 100 MG TABLET SR 24 HR Take 1.5 Tabs by mouth every day. 30 Tab 5   • azithromycin (ZITHROMAX) 250 MG Tab Take 2 tablets on day 1.  Take 1 tablet on day 2 through 5. (Patient not taking: Reported on 9/13/2019) 6 Tab 0   • ondansetron (ZOFRAN) 8 MG Tab Take 1 Tab by mouth every 8 hours as needed for Nausea/Vomiting. (Patient not taking: Reported on 9/13/2019) 15 Tab 0     No current facility-administered medications on file prior to visit.      Social History     Socioeconomic History   • Marital status: Single     Spouse name: Not on file   • Number of children: Not on file   • Years of education: Not on file   • Highest education level: Not on file   Occupational History   • Not on file   Social Needs   • Financial resource strain: Not on file   • Food insecurity:     " "Worry: Not on file     Inability: Not on file   • Transportation needs:     Medical: Not on file     Non-medical: Not on file   Tobacco Use   • Smoking status: Current Every Day Smoker     Packs/day: 0.50   • Smokeless tobacco: Never Used   Substance and Sexual Activity   • Alcohol use: No   • Drug use: Yes     Types: Inhaled, Marijuana     Comment: Usually every day, has not smoked since Sunday   • Sexual activity: Not on file   Lifestyle   • Physical activity:     Days per week: Not on file     Minutes per session: Not on file   • Stress: Not on file   Relationships   • Social connections:     Talks on phone: Not on file     Gets together: Not on file     Attends Shinto service: Not on file     Active member of club or organization: Not on file     Attends meetings of clubs or organizations: Not on file     Relationship status: Not on file   • Intimate partner violence:     Fear of current or ex partner: Not on file     Emotionally abused: Not on file     Physically abused: Not on file     Forced sexual activity: Not on file   Other Topics Concern   • Not on file   Social History Narrative   • Not on file     Breast Cancer-related family history is not on file..      Review of Systems   Constitutional: Negative for chills and fever.   HENT: Negative for ear pain.         Oral pain   Eyes: Positive for redness. Negative for discharge.   Gastrointestinal: Negative for nausea.   Musculoskeletal: Negative for myalgias and neck pain.   Neurological: Positive for headaches. Negative for dizziness.          Objective:     /68 (BP Location: Left arm, Patient Position: Sitting, BP Cuff Size: Adult)   Pulse (!) 55   Temp 36.8 °C (98.2 °F) (Temporal)   Resp 16   Ht 1.702 m (5' 7\")   Wt 68.1 kg (150 lb 3.2 oz)   SpO2 98%   BMI 23.52 kg/m²      Physical Exam   Constitutional: She is oriented to person, place, and time. She appears well-developed and well-nourished. No distress.   HENT:   Head: Normocephalic and " atraumatic.   Right Ear: External ear and ear canal normal. Tympanic membrane is not injected and not perforated. No middle ear effusion.   Left Ear: External ear and ear canal normal. Tympanic membrane is not injected and not perforated.  No middle ear effusion.   Nose: Mucosal edema present.   Mouth/Throat: Abnormal dentition. Dental caries present. No oropharyngeal exudate or posterior oropharyngeal erythema.   Eyes: Conjunctivae are normal. Right eye exhibits no discharge. Left eye exhibits no discharge.   Neck: Normal range of motion. Neck supple.   Cardiovascular: Normal rate, regular rhythm and normal heart sounds.   No murmur heard.  Pulmonary/Chest: Effort normal and breath sounds normal. No respiratory distress.   Musculoskeletal: Normal range of motion.   Normal movement of all 4 extremities.   Lymphadenopathy:     She has no cervical adenopathy.        Right: No supraclavicular adenopathy present.        Left: No supraclavicular adenopathy present.   Neurological: She is alert and oriented to person, place, and time. Gait normal.   Skin: Skin is warm and dry.   Psychiatric: She has a normal mood and affect. Her behavior is normal. Thought content normal.   Nursing note and vitals reviewed.              Assessment/Plan:     1. Pain, dental  amoxicillin (AMOXIL) 875 MG tablet   2. Irritation of right eye       Patient requesting stronger pain medications however after reviewing [facility], she has history of being on buprenorphine (12/18) by a doctor in [place] which is for opioid dependence so I have decline this

## 2019-09-24 ENCOUNTER — APPOINTMENT (OUTPATIENT)
Dept: PULMONOLOGY | Facility: HOSPICE | Age: 54
End: 2019-09-24
Attending: FAMILY MEDICINE
Payer: MEDICARE

## 2019-11-21 ENCOUNTER — OFFICE VISIT (OUTPATIENT)
Dept: MEDICAL GROUP | Facility: PHYSICIAN GROUP | Age: 54
End: 2019-11-21
Payer: MEDICARE

## 2019-11-21 VITALS
DIASTOLIC BLOOD PRESSURE: 64 MMHG | HEIGHT: 67 IN | HEART RATE: 60 BPM | OXYGEN SATURATION: 97 % | TEMPERATURE: 97.6 F | WEIGHT: 153 LBS | BODY MASS INDEX: 24.01 KG/M2 | SYSTOLIC BLOOD PRESSURE: 120 MMHG

## 2019-11-21 DIAGNOSIS — F41.9 ANXIETY: ICD-10-CM

## 2019-11-21 DIAGNOSIS — J40 BRONCHITIS: ICD-10-CM

## 2019-11-21 DIAGNOSIS — I49.3 PVC'S (PREMATURE VENTRICULAR CONTRACTIONS): ICD-10-CM

## 2019-11-21 DIAGNOSIS — Z23 NEED FOR VACCINATION: ICD-10-CM

## 2019-11-21 PROCEDURE — 90686 IIV4 VACC NO PRSV 0.5 ML IM: CPT | Performed by: FAMILY MEDICINE

## 2019-11-21 PROCEDURE — G0009 ADMIN PNEUMOCOCCAL VACCINE: HCPCS | Performed by: FAMILY MEDICINE

## 2019-11-21 PROCEDURE — 90732 PPSV23 VACC 2 YRS+ SUBQ/IM: CPT | Performed by: FAMILY MEDICINE

## 2019-11-21 PROCEDURE — G0008 ADMIN INFLUENZA VIRUS VAC: HCPCS | Performed by: FAMILY MEDICINE

## 2019-11-21 PROCEDURE — 99214 OFFICE O/P EST MOD 30 MIN: CPT | Mod: 25 | Performed by: FAMILY MEDICINE

## 2019-11-21 RX ORDER — PREDNISONE 20 MG/1
40 TABLET ORAL DAILY
Qty: 10 TAB | Refills: 0 | Status: SHIPPED | OUTPATIENT
Start: 2019-11-21 | End: 2019-11-26

## 2019-11-21 RX ORDER — METOPROLOL SUCCINATE 100 MG/1
100 TABLET, EXTENDED RELEASE ORAL DAILY
Qty: 90 TAB | Refills: 3 | Status: SHIPPED | OUTPATIENT
Start: 2019-11-21 | End: 2020-12-28 | Stop reason: SDUPTHER

## 2019-11-21 RX ORDER — BUSPIRONE HYDROCHLORIDE 10 MG/1
10 TABLET ORAL 2 TIMES DAILY
Qty: 180 TAB | Refills: 3 | Status: SHIPPED | OUTPATIENT
Start: 2019-11-21 | End: 2021-03-01 | Stop reason: SDUPTHER

## 2019-11-21 RX ORDER — TRAZODONE HYDROCHLORIDE 150 MG/1
TABLET ORAL
Qty: 90 TAB | Refills: 3 | Status: SHIPPED | OUTPATIENT
Start: 2019-11-21 | End: 2020-11-25 | Stop reason: SDUPTHER

## 2019-11-21 RX ORDER — AZITHROMYCIN 250 MG/1
TABLET, FILM COATED ORAL
Qty: 6 TAB | Refills: 0 | Status: SHIPPED | OUTPATIENT
Start: 2019-11-21 | End: 2020-01-02

## 2019-11-21 NOTE — PROGRESS NOTES
CC: Respiratory infection    HISTORY OF THE PRESENT ILLNESS: Patient is a 54 y.o. female. This pleasant patient is here today to discuss following health issues.    Patient is here today with concerns for a respiratory infection.  Notes that it started about a week ago with productive cough, subjective fevers and chills, headache, body ache and mild sore throat.  Notes that multiple grandchildren living in the same vicinity as her are sick and also unvaccinated.    She feels as if she is overall getting better, but does note that she is having some wheezing and cough.  In the past we have been concerned that she has COPD given her heavy smoking history.  Patient has not yet gotten her pulmonary function testing done.  She has not had any fevers or chills for about 3 to 4 days.    Also needing refills on trazodone, metoprolol and buspirone.    Allergies: Patient has no known allergies.    Current Outpatient Medications Ordered in Epic   Medication Sig Dispense Refill   • azithromycin (ZITHROMAX) 250 MG Tab Take 2 tablets on day 1.  Take 1 tablet daily on day 2 through 5. 6 Tab 0   • predniSONE (DELTASONE) 20 MG Tab Take 2 Tabs by mouth every day for 5 days. 10 Tab 0   • metoprolol SR (TOPROL XL) 100 MG TABLET SR 24 HR Take 1 Tab by mouth every day. 90 Tab 3   • busPIRone (BUSPAR) 10 MG Tab tablet Take 1 Tab by mouth 2 times a day. 180 Tab 3   • traZODone (DESYREL) 150 MG Tab TAKE 1 TABLET BY MOUTH AT BEDTIME 90 Tab 3   • valacyclovir (VALTREX) 1 GM Tab Take 0.5 Tabs by mouth 2 times a day. 30 Tab 11   • albuterol 108 (90 Base) MCG/ACT Aero Soln inhalation aerosol Inhale 2 Puffs by mouth every 6 hours as needed for Shortness of Breath. 8.5 g 2   • tizanidine (ZANAFLEX) 6 MG capsule TK ONE C PO  BID PRF PAINFUL MUSCLE TENSION  1     No current [facility]-ordered facility-administered medications on file.        Past Medical History:   Diagnosis Date   • Anxiety    • Chronic back pain    • PVC (premature ventricular  "contraction)        Past Surgical History:   Procedure Laterality Date   • KNEE ARTHROSCOPY     • OPEN REDUCTION     • PB ENLARGE BREAST WITH IMPLANT     • PRIMARY C SECTION     • TONSILLECTOMY         Social History     Tobacco Use   • Smoking status: Current Every Day Smoker     Packs/day: 0.50   • Smokeless tobacco: Never Used   Substance Use Topics   • Alcohol use: No   • Drug use: Yes     Types: Inhaled, Marijuana     Comment: Usually every day, has not smoked since Sunday       Social History     Patient does not qualify to have social determinant information on file (likely too young).   Social History Narrative   • Not on file       Family History   Problem Relation Age of Onset   • Heart Disease Mother    • Diabetes Mother    • Psychiatric Illness Father        ROS:     - Constitutional: Negative for fever, chills, unexpected weight change, and fatigue/generalized weakness.     - HEENT positive for nasal congestion.     - Respiratory: Positive for cough, wheezing and sputum production.       - Cardiovascular: Negative for chest pain, palpitations, orthopnea, PND, and bilateral lower extremity edema.     Exam: /64 (BP Location: Right arm, Patient Position: Sitting, BP Cuff Size: Adult)   Pulse 60   Temp 36.4 °C (97.6 °F) (Temporal)   Ht 1.702 m (5' 7\")   Wt 69.4 kg (153 lb)   SpO2 97%  Body mass index is 23.96 kg/m².    General: Well appearing, NAD  HEENT: Normocephalic. Conjunctiva clear, lids without ptosis, pupils equal and reactive to light accommodation, ears normal shape and contour, canals are clear bilaterally, tympanic membranes without bulging or erythema and normal cone of light, oropharynx is without erythema, edema or exudates.   Pulmonary: Diffuse expiratory wheezing noted and harsh cough noted as well.  Cardiovascular: Regular rate and rhythm without murmur, rubs or gallop.   Skin: Warm and dry.  No obvious lesions.  Musculoskeletal:  No extremity cyanosis, clubbing, or " edema.  Psych: Normal mood and affect. Alert and oriented. Judgment and insight is normal.    Please note that this dictation was created using voice recognition software. I have made every reasonable attempt to correct obvious errors, but I expect that there are errors of grammar and possibly content that I did not discover before finalizing the note.      Assessment/Plan  Arabella was seen today for uri.    Diagnoses and all orders for this visit:    PVC's (premature ventricular contractions)  Chronic well-controlled problem for the patient.  Metoprolol refilled today.  -     metoprolol SR (TOPROL XL) 100 MG TABLET SR 24 HR; Take 1 Tab by mouth every day.    Anxiety  Chronic well-controlled problem for the patient.  Buspirone and trazodone refilled today.  -     busPIRone (BUSPAR) 10 MG Tab tablet; Take 1 Tab by mouth 2 times a day.  -     traZODone (DESYREL) 150 MG Tab; TAKE 1 TABLET BY MOUTH AT BEDTIME    Need for vaccination  -     Influenza Vaccine Quad Injection (PF)  -     PneumoVax PPV23 =>1yo    Bronchitis  New uncontrolled issue for the patient.  I strongly suspect COPD exacerbation, though patient has not yet gotten her pulmonary function test.  Will treat with a azithromycin and prednisone x5 days.  Patient agrees to get her pulmonary function testing done but of asked her to do after she recovers from this acute illness.  -     azithromycin (ZITHROMAX) 250 MG Tab; Take 2 tablets on day 1.  Take 1 tablet daily on day 2 through 5.  -     predniSONE (DELTASONE) 20 MG Tab; Take 2 Tabs by mouth every day for 5 days.      Follow-up in 3 months or sooner if needed.    Laura Abrams,   [facility] [place]

## 2019-11-21 NOTE — LETTER
November 21, 2019        Arabella Anglin  [address] [place] [address]        Dear Arabella:    As one of your medical providers, we greatly value the trust you have placed in [facility]. Our goal is to provide you with the best healthcare experience possible, and we appreciate having you as our patient.     It has come to our attention that you have missed your appointment with one of our providers on 11/20/19 without cancelling ahead of time. We understand that life gets busy at times, but please remember that we set aside the providers’ time to take care of you. We trust you understand our concern, and hope we can count on seeing you at your future appointments. Three or more no-shows can be cause for dismissal from our practice.    In the event that you cannot make it to one of your scheduled appointments, please reschedule at least 24 hours in advance by calling [phone] and asking for Medical Group scheduling. Our schedulers are available 7am to 9pm, Monday through Friday and from 7am to 6pm, on Weekends and Holidays.    Thank you for trusting us with your healthcare, and we look forward to seeing you at your next appointment. If you have any questions or feel that you have received this letter in error, please do not hesitate to call.      Sincerely,    [facility]

## 2019-12-31 ENCOUNTER — OFFICE VISIT (OUTPATIENT)
Dept: MEDICAL GROUP | Facility: PHYSICIAN GROUP | Age: 54
End: 2019-12-31
Payer: MEDICARE

## 2019-12-31 VITALS
HEIGHT: 67 IN | WEIGHT: 155 LBS | BODY MASS INDEX: 24.33 KG/M2 | SYSTOLIC BLOOD PRESSURE: 122 MMHG | DIASTOLIC BLOOD PRESSURE: 64 MMHG | OXYGEN SATURATION: 94 % | TEMPERATURE: 98.2 F | HEART RATE: 62 BPM

## 2019-12-31 DIAGNOSIS — J40 BRONCHITIS: ICD-10-CM

## 2019-12-31 DIAGNOSIS — R45.89 DEPRESSED MOOD: ICD-10-CM

## 2019-12-31 DIAGNOSIS — F41.9 ANXIETY: ICD-10-CM

## 2019-12-31 PROCEDURE — 99215 OFFICE O/P EST HI 40 MIN: CPT | Performed by: FAMILY MEDICINE

## 2019-12-31 RX ORDER — PREDNISONE 20 MG/1
40 TABLET ORAL DAILY
Qty: 10 TAB | Refills: 0 | Status: SHIPPED | OUTPATIENT
Start: 2019-12-31 | End: 2020-01-05

## 2019-12-31 RX ORDER — ESCITALOPRAM OXALATE 10 MG/1
10 TABLET ORAL DAILY
Qty: 30 TAB | Refills: 11 | Status: SHIPPED | OUTPATIENT
Start: 2019-12-31 | End: 2020-01-02

## 2019-12-31 RX ORDER — DOXYCYCLINE HYCLATE 100 MG
100 TABLET ORAL 2 TIMES DAILY
Qty: 10 TAB | Refills: 0 | Status: SHIPPED | OUTPATIENT
Start: 2019-12-31 | End: 2020-01-05

## 2020-01-02 ENCOUNTER — OFFICE VISIT (OUTPATIENT)
Dept: MEDICAL GROUP | Facility: PHYSICIAN GROUP | Age: 55
End: 2020-01-02
Payer: MEDICARE

## 2020-01-02 VITALS
BODY MASS INDEX: 24.17 KG/M2 | WEIGHT: 154 LBS | OXYGEN SATURATION: 97 % | SYSTOLIC BLOOD PRESSURE: 138 MMHG | TEMPERATURE: 98.4 F | HEIGHT: 67 IN | HEART RATE: 61 BPM | DIASTOLIC BLOOD PRESSURE: 78 MMHG

## 2020-01-02 DIAGNOSIS — F41.9 ANXIETY: ICD-10-CM

## 2020-01-02 PROCEDURE — 99214 OFFICE O/P EST MOD 30 MIN: CPT | Performed by: FAMILY MEDICINE

## 2020-01-02 RX ORDER — LORAZEPAM 0.5 MG/1
0.5 TABLET ORAL
Qty: 30 TAB | Refills: 0 | Status: SHIPPED | OUTPATIENT
Start: 2020-01-02 | End: 2020-02-01

## 2020-01-02 RX ORDER — ESCITALOPRAM OXALATE 20 MG/1
TABLET ORAL
Qty: 30 TAB | Refills: 11 | Status: SHIPPED | OUTPATIENT
Start: 2020-01-02 | End: 2020-03-25

## 2020-01-02 RX ORDER — LORAZEPAM 0.5 MG/1
0.5 TABLET ORAL
Qty: 30 TAB | Refills: 0 | Status: SHIPPED | OUTPATIENT
Start: 2020-01-02 | End: 2020-01-02 | Stop reason: SDUPTHER

## 2020-01-02 NOTE — PROGRESS NOTES
CC: Anxiety    HISTORY OF THE PRESENT ILLNESS: Patient is a 54 y.o. female. This pleasant patient is here today to discuss the following health issues.    Patient is here today to discuss her anxiety.  She was actually present 2 days ago for an appointment and daughter was present for this appointment as well.  Patient has struggled with very severe anxiety and depression throughout her life and was requesting treatment options 2 days ago, but states that daughter is very anti-medicine so she did not feel comfortable openly discussing treatment options in front of her.    As above, patient struggling with anxiety and depression severely throughout her life.  She has been on SSRIs in the past.  She is currently on buspirone and trazodone.  She did start low-dose Lexapro 2 days ago after discussion during her appointment.  She notes that she is also intermittently taking Lorazepam due to severe panic attacks.    She reports that during this coming months she has a lot of changes in things to deal with from a family standpoint.  Anxiety has been quite severe and she is having essentially daily panic attacks at this time.  She would like to get back on lorazepam for the short-term until the Lexapro can start taking effect.  She has successfully gone on and off of lorazepam in the past without any significant tolerance or withdrawal.    She is trying to get in with a psychiatrist and has called a couple of offices and left messages to see what their wait time is.    Allergies: Patient has no known allergies.    Current Outpatient Medications Ordered in Epic   Medication Sig Dispense Refill   • escitalopram (LEXAPRO) 20 MG tablet Take one pill daily during week 2 and beyond. 30 Tab 11   • LORazepam (ATIVAN) 0.5 MG Tab Take 1 Tab by mouth 1 time daily as needed for Anxiety for up to 30 days. 30 Tab 0   • predniSONE (DELTASONE) 20 MG Tab Take 2 Tabs by mouth every day for 5 days. 10 Tab 0   • doxycycline (VIBRAMYCIN) 100 MG  "Tab Take 1 Tab by mouth 2 times a day for 5 days. 10 Tab 0   • metoprolol SR (TOPROL XL) 100 MG TABLET SR 24 HR Take 1 Tab by mouth every day. 90 Tab 3   • busPIRone (BUSPAR) 10 MG Tab tablet Take 1 Tab by mouth 2 times a day. 180 Tab 3   • traZODone (DESYREL) 150 MG Tab TAKE 1 TABLET BY MOUTH AT BEDTIME 90 Tab 3   • valacyclovir (VALTREX) 1 GM Tab Take 0.5 Tabs by mouth 2 times a day. 30 Tab 11   • albuterol 108 (90 Base) MCG/ACT Aero Soln inhalation aerosol Inhale 2 Puffs by mouth every 6 hours as needed for Shortness of Breath. 8.5 g 2   • tizanidine (ZANAFLEX) 6 MG capsule TK ONE C PO  BID PRF PAINFUL MUSCLE TENSION  1     No current Epic-ordered facility-administered medications on file.        Past Medical History:   Diagnosis Date   • Anxiety    • Chronic back pain    • PVC (premature ventricular contraction)        Past Surgical History:   Procedure Laterality Date   • KNEE ARTHROSCOPY     • OPEN REDUCTION     • PB ENLARGE BREAST WITH IMPLANT     • PRIMARY C SECTION     • TONSILLECTOMY         Social History     Tobacco Use   • Smoking status: Current Every Day Smoker     Packs/day: 0.50   • Smokeless tobacco: Never Used   Substance Use Topics   • Alcohol use: No   • Drug use: Yes     Types: Inhaled, Marijuana     Comment: Usually every day, has not smoked since Sunday       Social History     Patient does not qualify to have social determinant information on file (likely too young).   Social History Narrative   • Not on file       Family History   Problem Relation Age of Onset   • Heart Disease Mother    • Diabetes Mother    • Psychiatric Illness Father        ROS:     - HEENT positive for nasal congestion due to respiratory infection.    - Respiratory: Positive for cough due to respiratory infection.      Exam: /78 (BP Location: Left arm, Patient Position: Sitting, BP Cuff Size: Adult)   Pulse 61   Temp 36.9 °C (98.4 °F) (Temporal)   Ht 1.702 m (5' 7\")   Wt 69.9 kg (154 lb)   SpO2 97%  Body mass " index is 24.12 kg/m².    General: Well appearing, NAD  Skin: Warm and dry.  No obvious lesions.  Musculoskeletal:  No extremity cyanosis, clubbing, or edema.  Psych: Normal mood and affect. Alert and oriented. Judgment and insight is normal.    Please note that this dictation was created using voice recognition software. I have made every reasonable attempt to correct obvious errors, but I expect that there are errors of grammar and possibly content that I did not discover before finalizing the note.      Assessment/Plan  Arabella was seen today for depression.    Diagnoses and all orders for this visit:    Anxiety  Chronic and worsening problem for the patient.  She did start taking Lexapro 10 mg 2 days ago.  We will go ahead and increase dose 1 week from now to 20 mg.  I am also okay with doing short-term lorazepam as this is worked well for the patient with past flares of her anxiety.  We did discuss risks of long-term benzodiazepine use at length including tolerance, addiction, increased risk for dementia.  She agrees that she will only use the lorazepam on an as-needed basis for panic attacks and plans to get off of it when Lexapro starts taking effect.  We will have close follow-up in 1 month, and patient will let me know if mood is getting worse and not better in the meantime.  -     escitalopram (LEXAPRO) 20 MG tablet; Take one pill daily during week 2 and beyond.  -     LORazepam (ATIVAN) 0.5 MG Tab; Take 1 Tab by mouth 1 time daily as needed for Anxiety for up to 30 days.    Obtained and reviewed patient utilization report from [facility] pharmacy database on 1/2/2020 3:56 PM  prior to writing prescription for controlled substance II, III or IV per [place] bill . Based on assessment of the report,my physical exam if necessary and the patient's health problem, the prescription is medically necessary.       Close follow-up in 4 weeks.    Laura Abrams,   [facility]

## 2020-01-02 NOTE — PROGRESS NOTES
CC: Respiratory infection    HISTORY OF THE PRESENT ILLNESS: Patient is a 54 y.o. female. This pleasant patient is here today to discuss the following health issues.    Patient is here today with primary concern of respiratory infection.  Notes that about 4 days ago or so she began to have significant nasal congestion and headache.  She feels as if it has progressed to a chest cold and she now has chest congestion and cough.  Cough is productive of green sputum.  She denies fevers, chills, myalgias, headache.  Entire family is sick with a cold.  She denies shortness of breath or wheezing at this time.  She does continue to smoke about 1/2 pack/day.  States that she wanted to catch this early as she has ended up with respiratory infections requiring antibiotics multiple times in the past year.    Also here with concerns for depression.  She actually [facility] as her depression and anxiety is quite severe.  They were not able to do anything for her other than give her a list of psychiatrist who she can call to get scheduled with.  She is currently only on buspirone for her anxiety and trazodone for sleep.  Daughter is present and feels that depression should be treated without medication including herbal supplements and vitamins.  Patient feels she needs something to help her until she can get in to see psychiatry.  Daughter states that she tried Zoloft and was too sedated on this medication.     Allergies: Patient has no known allergies.    Current Outpatient Medications Ordered in Epic   Medication Sig Dispense Refill   • predniSONE (DELTASONE) 20 MG Tab Take 2 Tabs by mouth every day for 5 days. 10 Tab 0   • doxycycline (VIBRAMYCIN) 100 MG Tab Take 1 Tab by mouth 2 times a day for 5 days. 10 Tab 0   • escitalopram (LEXAPRO) 10 MG Tab Take 1 Tab by mouth every day. 30 Tab 11   • metoprolol SR (TOPROL XL) 100 MG TABLET SR 24 HR Take 1 Tab by mouth every day. 90 Tab 3   •  busPIRone (BUSPAR) 10 MG Tab tablet Take 1 Tab by mouth 2 times a day. 180 Tab 3   • traZODone (DESYREL) 150 MG Tab TAKE 1 TABLET BY MOUTH AT BEDTIME 90 Tab 3   • valacyclovir (VALTREX) 1 GM Tab Take 0.5 Tabs by mouth 2 times a day. 30 Tab 11   • albuterol 108 (90 Base) MCG/ACT Aero Soln inhalation aerosol Inhale 2 Puffs by mouth every 6 hours as needed for Shortness of Breath. 8.5 g 2   • tizanidine (ZANAFLEX) 6 MG capsule TK ONE C PO  BID PRF PAINFUL MUSCLE TENSION  1     No current Epic-ordered facility-administered medications on file.        Past Medical History:   Diagnosis Date   • Anxiety    • Chronic back pain    • PVC (premature ventricular contraction)        Past Surgical History:   Procedure Laterality Date   • KNEE ARTHROSCOPY     • OPEN REDUCTION     • PB ENLARGE BREAST WITH IMPLANT     • PRIMARY C SECTION     • TONSILLECTOMY         Social History     Tobacco Use   • Smoking status: Current Every Day Smoker     Packs/day: 0.50   • Smokeless tobacco: Never Used   Substance Use Topics   • Alcohol use: No   • Drug use: Yes     Types: Inhaled, Marijuana     Comment: Usually every day, has not smoked since Sunday       Social History     Patient does not qualify to have social determinant information on file (likely too young).   Social History Narrative   • Not on file       Family History   Problem Relation Age of Onset   • Heart Disease Mother    • Diabetes Mother    • Psychiatric Illness Father        ROS:     - Constitutional: Positive for fatigue.  Negative for fever, chills, unexpected weight change, and generalized weakness.     - HEENT positive for headache, sinus congestion, sore throat.    - Respiratory: Positive for productive cough and chest congestion.  Negative for dyspnea, wheezing, and crackles.      - Cardiovascular: Negative for chest pain, palpitations, orthopnea, PND, and bilateral lower extremity edema.       Exam: /64 (BP Location: Right arm, Patient Position: Sitting, BP Cuff  "Size: Adult)   Pulse 62   Temp 36.8 °C (98.2 °F) (Temporal)   Ht 1.702 m (5' 7\")   Wt 70.3 kg (155 lb)   SpO2 94%  Body mass index is 24.28 kg/m².    General: Well appearing, NAD  HEENT: Normocephalic. Conjunctiva clear, lids without ptosis, pupils equal and reactive to light accommodation, ears normal shape and contour, canals are clear bilaterally, tympanic membranes without bulging or erythema and normal cone of light, oropharynx is without erythema, edema or exudates.   Neck: Supple without JVD. No thyromegaly or nodules  Pulmonary: Clear to ausculation.  Normal effort. No rales, ronchi, or wheezing.  Harsh cough noted.  Cardiovascular: Regular rate and rhythm without murmur, rubs or gallop.   Lymph: No cervical, supraclavicular lymph nodes are palpable  Skin: Warm and dry.  No obvious lesions.  Musculoskeletal:  No extremity cyanosis, clubbing, or edema.  Psych: Normal mood and affect. Alert and oriented. Judgment and insight is normal.    Please note that this dictation was created using voice recognition software. I have made every reasonable attempt to correct obvious errors, but I expect that there are errors of grammar and possibly content that I did not discover before finalizing the note.      Assessment/Plan  Arabella was seen today for uri.    Diagnoses and all orders for this visit:    Anxiety  Depressed Mood  These have been ongoing issues for the patient over the past year and a half, of varying severity.  Feel patient would benefit from antidepressant but daughter is quite against this during today's encounter.  Patient is actually very open to it.  Large amount of time was spent today discussing appropriate treatment options for depression and anxiety and ultimately patient agreed to start Lexapro as below.  Side effects were discussed with patient.  We will follow-up in about 4 to 6 weeks from now.  -     escitalopram (LEXAPRO) 10 MG Tab; Take 1 Tab by mouth every day.    Bronchitis  New " uncontrolled problem for the patient.  At this point with a normal exam and no fevers, I suspect that this is viral in nature, though patient tends towards what I suspect are COPD exacerbations given her smoking history (though no formal PFTs on file).  At this point I have given her prescription for prednisone and doxycycline but asked her to wait another 2 to 3 days to see how she does prior to starting it.  If she begins to develop wheezing, dyspnea or worsening cough, fevers or chills she can go ahead and start the medications as below.  Red flags reviewed with patient as well.  -     predniSONE (DELTASONE) 20 MG Tab; Take 2 Tabs by mouth every day for 5 days.  -     doxycycline (VIBRAMYCIN) 100 MG Tab; Take 1 Tab by mouth 2 times a day for 5 days.      Counseling: Patient was seen for a total of 40 minutes face-to-face by myself, with more than half of the time spent counseling on various medication options for depression and appropriate treatment for depression and anxiety with both patient and daughter.    Follow up in 6 weeks for depression.     Laura Abrams DO  [facility]

## 2020-02-28 ENCOUNTER — APPOINTMENT (OUTPATIENT)
Dept: RADIOLOGY | Facility: IMAGING CENTER | Age: 55
End: 2020-02-28
Attending: NURSE PRACTITIONER
Payer: MEDICARE

## 2020-02-28 ENCOUNTER — OFFICE VISIT (OUTPATIENT)
Dept: URGENT CARE | Facility: PHYSICIAN GROUP | Age: 55
End: 2020-02-28
Payer: MEDICARE

## 2020-02-28 VITALS
SYSTOLIC BLOOD PRESSURE: 110 MMHG | OXYGEN SATURATION: 96 % | WEIGHT: 155 LBS | RESPIRATION RATE: 16 BRPM | BODY MASS INDEX: 24.33 KG/M2 | DIASTOLIC BLOOD PRESSURE: 62 MMHG | HEART RATE: 116 BPM | HEIGHT: 67 IN | TEMPERATURE: 103.8 F

## 2020-02-28 DIAGNOSIS — R06.2 WHEEZE: ICD-10-CM

## 2020-02-28 DIAGNOSIS — R68.89 FLU-LIKE SYMPTOMS: ICD-10-CM

## 2020-02-28 DIAGNOSIS — R50.9 FEVER, UNSPECIFIED FEVER CAUSE: ICD-10-CM

## 2020-02-28 LAB
FLUAV+FLUBV AG SPEC QL IA: NORMAL
INT CON NEG: NEGATIVE
INT CON POS: POSITIVE

## 2020-02-28 PROCEDURE — 94640 AIRWAY INHALATION TREATMENT: CPT | Performed by: NURSE PRACTITIONER

## 2020-02-28 PROCEDURE — 71046 X-RAY EXAM CHEST 2 VIEWS: CPT | Mod: TC | Performed by: NURSE PRACTITIONER

## 2020-02-28 PROCEDURE — 87804 INFLUENZA ASSAY W/OPTIC: CPT | Performed by: NURSE PRACTITIONER

## 2020-02-28 PROCEDURE — 99214 OFFICE O/P EST MOD 30 MIN: CPT | Mod: 25 | Performed by: NURSE PRACTITIONER

## 2020-02-28 RX ORDER — METHYLPREDNISOLONE 4 MG/1
TABLET ORAL
Qty: 21 TAB | Refills: 0 | Status: SHIPPED | OUTPATIENT
Start: 2020-02-28 | End: 2020-03-25

## 2020-02-28 RX ORDER — OXYCODONE HYDROCHLORIDE AND ACETAMINOPHEN 5; 325 MG/1; MG/1
TABLET ORAL
COMMUNITY
Start: 2020-02-26 | End: 2020-06-15

## 2020-02-28 RX ORDER — OSELTAMIVIR PHOSPHATE 75 MG/1
75 CAPSULE ORAL 2 TIMES DAILY
Qty: 10 CAP | Refills: 0 | Status: SHIPPED | OUTPATIENT
Start: 2020-02-28 | End: 2020-03-25

## 2020-02-28 RX ORDER — ACETAMINOPHEN 500 MG
500-1000 TABLET ORAL EVERY 6 HOURS PRN
Qty: 30 TAB | Refills: 0 | Status: SHIPPED | OUTPATIENT
Start: 2020-02-28 | End: 2021-02-26

## 2020-02-28 RX ORDER — IPRATROPIUM BROMIDE AND ALBUTEROL SULFATE 2.5; .5 MG/3ML; MG/3ML
3 SOLUTION RESPIRATORY (INHALATION) ONCE
Status: COMPLETED | OUTPATIENT
Start: 2020-02-28 | End: 2020-02-28

## 2020-02-28 RX ORDER — ACETAMINOPHEN 500 MG
500 TABLET ORAL ONCE
Status: COMPLETED | OUTPATIENT
Start: 2020-02-28 | End: 2020-02-28

## 2020-02-28 RX ADMIN — IPRATROPIUM BROMIDE AND ALBUTEROL SULFATE 3 ML: 2.5; .5 SOLUTION RESPIRATORY (INHALATION) at 18:45

## 2020-02-28 RX ADMIN — Medication 500 MG: at 19:00

## 2020-02-28 ASSESSMENT — ENCOUNTER SYMPTOMS
FEVER: 1
WEAKNESS: 0
CONSTIPATION: 0
SHORTNESS OF BREATH: 0
DIZZINESS: 0
COUGH: 1
EYE DISCHARGE: 0
WHEEZING: 0
PALPITATIONS: 0
CHILLS: 1
EYE REDNESS: 0
SPUTUM PRODUCTION: 0
ABDOMINAL PAIN: 0
SORE THROAT: 0
NAUSEA: 0
VOMITING: 0
ORTHOPNEA: 0
DIARRHEA: 0
MYALGIAS: 1
HEADACHES: 1

## 2020-02-28 ASSESSMENT — FIBROSIS 4 INDEX: FIB4 SCORE: 1.45

## 2020-02-29 NOTE — PROGRESS NOTES
"Subjective:      Arabella Anglin is a 54 y.o. female who presents with Fever (bodyaches, chills, headache, x this am )            HPI  Fever, flu like symptoms started this morning. Took her Percocet this morning as it has Tylenol, states unable to take Ibuprofen, no other otc meds used. Nasal congestion, mild runny nose, sore throat, states \"feels like razor blades\". States h/o PNA last year. Smoker, 1/2 pack/day. H/o COPD. Has albuterol inhaler but has not used. Wheeze. Fatigue, malaise.    PMH:  has a past medical history of Anxiety, Chronic back pain, and PVC (premature ventricular contraction).  MEDS:   Current Outpatient Medications:   •  oxyCODONE-acetaminophen (PERCOCET) 5-325 MG Tab, TK 1 T PO BID PRN FOR 30 DAYS, Disp: , Rfl:   •  metoprolol SR (TOPROL XL) 100 MG TABLET SR 24 HR, Take 1 Tab by mouth every day., Disp: 90 Tab, Rfl: 3  •  busPIRone (BUSPAR) 10 MG Tab tablet, Take 1 Tab by mouth 2 times a day., Disp: 180 Tab, Rfl: 3  •  traZODone (DESYREL) 150 MG Tab, TAKE 1 TABLET BY MOUTH AT BEDTIME, Disp: 90 Tab, Rfl: 3  •  valacyclovir (VALTREX) 1 GM Tab, Take 0.5 Tabs by mouth 2 times a day., Disp: 30 Tab, Rfl: 11  •  albuterol 108 (90 Base) MCG/ACT Aero Soln inhalation aerosol, Inhale 2 Puffs by mouth every 6 hours as needed for Shortness of Breath., Disp: 8.5 g, Rfl: 2  •  escitalopram (LEXAPRO) 20 MG tablet, Take one pill daily during week 2 and beyond. (Patient not taking: Reported on 2/28/2020), Disp: 30 Tab, Rfl: 11  •  tizanidine (ZANAFLEX) 6 MG capsule, TK ONE C PO  BID PRF PAINFUL MUSCLE TENSION, Disp: , Rfl: 1  ALLERGIES: No Known Allergies  SURGHX:   Past Surgical History:   Procedure Laterality Date   • KNEE ARTHROSCOPY     • OPEN REDUCTION     • PB ENLARGE BREAST WITH IMPLANT     • PRIMARY C SECTION     • TONSILLECTOMY       SOCHX:  reports that she has been smoking. She has been smoking about 0.50 packs per day. She has never used smokeless tobacco. She reports current drug use. Drugs: " "Inhaled and Marijuana. She reports that she does not drink alcohol.  FH: Family history was reviewed, no pertinent findings to report    Review of Systems   Constitutional: Positive for chills, fever and malaise/fatigue.   HENT: Positive for congestion. Negative for ear pain and sore throat.    Eyes: Negative for discharge and redness.   Respiratory: Positive for cough. Negative for sputum production, shortness of breath and wheezing.    Cardiovascular: Negative for chest pain, palpitations and orthopnea.   Gastrointestinal: Negative for abdominal pain, constipation, diarrhea, nausea and vomiting.   Musculoskeletal: Positive for myalgias.   Skin: Negative for itching and rash.   Neurological: Positive for headaches. Negative for dizziness and weakness.   All other systems reviewed and are negative.         Objective:     /62 (BP Location: Right arm, Patient Position: Sitting, BP Cuff Size: Adult)   Pulse (!) 116   Temp (!) 39.9 °C (103.8 °F) (Tympanic)   Resp 16   Ht 1.702 m (5' 7\")   Wt 70.3 kg (155 lb)   SpO2 93%   Breastfeeding No   BMI 24.28 kg/m²      Physical Exam  Vitals signs reviewed.   Constitutional:       General: She is awake. She is not in acute distress.     Appearance: She is well-developed. She is ill-appearing. She is not toxic-appearing or diaphoretic.      Comments: Appears fatigued.    HENT:      Head: Normocephalic.      Right Ear: Tympanic membrane, ear canal and external ear normal.      Left Ear: Tympanic membrane, ear canal and external ear normal.      Nose: Mucosal edema, congestion and rhinorrhea present.      Mouth/Throat:      Mouth: Mucous membranes are dry.      Pharynx: Uvula midline. Posterior oropharyngeal erythema present. No pharyngeal swelling, oropharyngeal exudate or uvula swelling.   Eyes:      Conjunctiva/sclera: Conjunctivae normal.      Pupils: Pupils are equal, round, and reactive to light.   Neck:      Musculoskeletal: Normal range of motion and neck " supple.   Cardiovascular:      Rate and Rhythm: Regular rhythm.   Pulmonary:      Effort: Pulmonary effort is normal. No accessory muscle usage or respiratory distress.      Breath sounds: Normal breath sounds and air entry. No stridor, decreased air movement or transmitted upper airway sounds. No decreased breath sounds, wheezing, rhonchi or rales.   Musculoskeletal: Normal range of motion.   Skin:     General: Skin is warm and dry.   Neurological:      Mental Status: She is alert and oriented to person, place, and time.      GCS: GCS eye subscore is 4. GCS verbal subscore is 5. GCS motor subscore is 6.      Cranial Nerves: Cranial nerves are intact.      Sensory: Sensation is intact.      Motor: Motor function is intact.      Coordination: Coordination is intact.      Gait: Gait is intact.   Psychiatric:         Behavior: Behavior is cooperative.      Comments: Patient appears sleepy but will wake up when spoken to. Understands commands and and answers questions appropriately.                  Assessment/Plan:       1. Fever, unspecified fever cause    - POCT Influenza A/B  - oseltamivir (TAMIFLU) 75 MG Cap; Take 1 Cap by mouth 2 times a day.  Dispense: 10 Cap; Refill: 0  - acetaminophen (TYLENOL) tablet 500 mg  - acetaminophen (TYLENOL) 500 MG Tab; Take 1-2 Tabs by mouth every 6 hours as needed.  Dispense: 30 Tab; Refill: 0    2. Flu-like symptoms    - POCT Influenza A/B  - oseltamivir (TAMIFLU) 75 MG Cap; Take 1 Cap by mouth 2 times a day.  Dispense: 10 Cap; Refill: 0    3. Wheeze    - ipratropium-albuterol (DUONEB) nebulizer solution  - DX-CHEST-2 VIEWS; Future  - methylPREDNISolone (MEDROL DOSEPAK) 4 MG Tablet Therapy Pack; Follow schedule on package instructions.  Dispense: 21 Tab; Refill: 0  Patient requesting Tylenol Rx  Will treat for flu like symptoms and high fever with Tamilfu, patient agrees to this plan of care  Increase water intake  May use Tylenol prn for fever or body aches  Get rest  May use  saline nasal spray prn for nasal congestion  Salt water gargle prn  May use OTC cough suppressant medications like plain Robitussin/Delsym prn  Monitor for fevers, productive cough, SOB, CP, chest tightness, sinus problems- need re-evaluation

## 2020-03-18 DIAGNOSIS — F41.9 ANXIETY: ICD-10-CM

## 2020-03-19 NOTE — TELEPHONE ENCOUNTER
Was the patient seen in the last year in this department? Yes    Does patient have an active prescription for medications requested? No     Received Request Via: Pharmacy    Pt met protocol?: Yes     Last OV 01/02/2020

## 2020-03-20 RX ORDER — HYDROXYZINE 50 MG/1
TABLET, FILM COATED ORAL
Qty: 90 TAB | Refills: 0 | Status: SHIPPED | OUTPATIENT
Start: 2020-03-20 | End: 2020-04-22

## 2020-03-25 ENCOUNTER — OFFICE VISIT (OUTPATIENT)
Dept: MEDICAL GROUP | Facility: PHYSICIAN GROUP | Age: 55
End: 2020-03-25
Payer: MEDICARE

## 2020-03-25 VITALS
BODY MASS INDEX: 22.76 KG/M2 | OXYGEN SATURATION: 96 % | HEART RATE: 109 BPM | SYSTOLIC BLOOD PRESSURE: 124 MMHG | DIASTOLIC BLOOD PRESSURE: 86 MMHG | HEIGHT: 67 IN | TEMPERATURE: 98.5 F | WEIGHT: 145 LBS

## 2020-03-25 DIAGNOSIS — K92.1 HEMATOCHEZIA: ICD-10-CM

## 2020-03-25 DIAGNOSIS — R10.32 LLQ PAIN: ICD-10-CM

## 2020-03-25 DIAGNOSIS — R19.7 DIARRHEA, UNSPECIFIED TYPE: ICD-10-CM

## 2020-03-25 PROCEDURE — 99214 OFFICE O/P EST MOD 30 MIN: CPT | Performed by: FAMILY MEDICINE

## 2020-03-25 RX ORDER — METRONIDAZOLE 500 MG/1
500 TABLET ORAL 3 TIMES DAILY
Qty: 21 TAB | Refills: 0 | Status: SHIPPED | OUTPATIENT
Start: 2020-03-25 | End: 2020-04-01

## 2020-03-25 RX ORDER — GABAPENTIN 300 MG/1
300 CAPSULE ORAL 3 TIMES DAILY
COMMUNITY
End: 2021-09-05

## 2020-03-25 RX ORDER — CIPROFLOXACIN 500 MG/1
500 TABLET, FILM COATED ORAL 2 TIMES DAILY
Qty: 14 TAB | Refills: 0 | Status: SHIPPED | OUTPATIENT
Start: 2020-03-25 | End: 2020-04-01

## 2020-03-25 ASSESSMENT — FIBROSIS 4 INDEX: FIB4 SCORE: 1.45

## 2020-03-25 ASSESSMENT — PATIENT HEALTH QUESTIONNAIRE - PHQ9: CLINICAL INTERPRETATION OF PHQ2 SCORE: 0

## 2020-03-26 NOTE — PROGRESS NOTES
"CC: Abdominal pain, blood in stools    HISTORY OF THE PRESENT ILLNESS: Patient is a 54 y.o. female. This pleasant patient is here today to discuss the following issue.    Patient is here for new issue of diarrhea and blood in the stools as well as left lower quadrant pain which started a couple of days ago.  Patient states that she began to have severe abdominal cramping and left lower quadrant pain.  She ended up with very significant multiple bouts of diarrhea.  She states that she \"smelled blood in her urine\" but never turned on the light to see how much was there.  The following day she had about a quarter sized clot of bright red blood in her stool.  She reports persistent loss of appetite, did have one episode of vomiting at the onset.  She has no sick contacts.  She denies fevers or chills.  She actually thinks her symptoms are somewhat better today.  She has had no further bowel movements.  She has never had a colonoscopy, though has been referred multiple times.    Allergies: Patient has no known allergies.    Current Outpatient Medications Ordered in Epic   Medication Sig Dispense Refill   • gabapentin (NEURONTIN) 300 MG Cap Take 300 mg by mouth 3 times a day.     • ciprofloxacin (CIPRO) 500 MG Tab Take 1 Tab by mouth 2 times a day for 7 days. 14 Tab 0   • metroNIDAZOLE (FLAGYL) 500 MG Tab Take 1 Tab by mouth 3 times a day for 7 days. 21 Tab 0   • hydrOXYzine HCl (ATARAX) 50 MG Tab TAKE 1 TABLET BY MOUTH THREE TIMES DAILY AS NEEDED FOR ITCHING 90 Tab 0   • oxyCODONE-acetaminophen (PERCOCET) 5-325 MG Tab TK 1 T PO BID PRN FOR 30 DAYS     • acetaminophen (TYLENOL) 500 MG Tab Take 1-2 Tabs by mouth every 6 hours as needed. 30 Tab 0   • metoprolol SR (TOPROL XL) 100 MG TABLET SR 24 HR Take 1 Tab by mouth every day. 90 Tab 3   • busPIRone (BUSPAR) 10 MG Tab tablet Take 1 Tab by mouth 2 times a day. 180 Tab 3   • traZODone (DESYREL) 150 MG Tab TAKE 1 TABLET BY MOUTH AT BEDTIME 90 Tab 3   • valacyclovir (VALTREX) " "1 GM Tab Take 0.5 Tabs by mouth 2 times a day. 30 Tab 11   • albuterol 108 (90 Base) MCG/ACT Aero Soln inhalation aerosol Inhale 2 Puffs by mouth every 6 hours as needed for Shortness of Breath. 8.5 g 2     No current Epic-ordered facility-administered medications on file.        Past Medical History:   Diagnosis Date   • Anxiety    • Chronic back pain    • PVC (premature ventricular contraction)        Past Surgical History:   Procedure Laterality Date   • KNEE ARTHROSCOPY     • OPEN REDUCTION     • PB ENLARGE BREAST WITH IMPLANT     • PRIMARY C SECTION     • TONSILLECTOMY         Social History     Tobacco Use   • Smoking status: Current Every Day Smoker     Packs/day: 0.50   • Smokeless tobacco: Never Used   Substance Use Topics   • Alcohol use: No   • Drug use: Yes     Types: Inhaled, Marijuana     Comment: Usually every day, has not smoked since Sunday       Social History     Social History Narrative   • Not on file       Family History   Problem Relation Age of Onset   • Heart Disease Mother    • Diabetes Mother    • Psychiatric Illness Father        ROS:     - Constitutional: Negative for fever, chills, unexpected weight change, and fatigue/generalized weakness.     - Respiratory: Negative for cough, sputum production, chest congestion, dyspnea, wheezing, and crackles.      - Cardiovascular: Negative for chest pain, palpitations, orthopnea, PND, and bilateral lower extremity edema.     Exam: /86 (BP Location: Left arm, Patient Position: Sitting, BP Cuff Size: Adult)   Pulse (!) 109   Temp 36.9 °C (98.5 °F) (Temporal)   Ht 1.702 m (5' 7\")   Wt 65.8 kg (145 lb)   SpO2 96%  Body mass index is 22.71 kg/m².    General: Well appearing, NAD  Pulmonary: Clear to ausculation.  Normal effort. No rales, ronchi, or wheezing.  Cardiovascular: Regular rate and rhythm without murmur, rubs or gallop.   Abdomen: Moderate left lower quadrant tenderness.  Soft, nondistended. Normal bowel sounds. Liver and spleen are " not palpable. No rebound or guarding the later  Skin: Warm and dry.  No obvious lesions.  Musculoskeletal:  No extremity cyanosis, clubbing, or edema.  Psych: Normal mood and affect. Alert and oriented. Judgment and insight is normal.    Please note that this dictation was created using voice recognition software. I have made every reasonable attempt to correct obvious errors, but I expect that there are errors of grammar and possibly content that I did not discover before finalizing the note.      Assessment/Plan  Arabella was seen today for abdominal pain.    Diagnoses and all orders for this visit:    Hematochezia  Diarrhea, unspecified type  LLQ pain  New uncontrolled problems for the patient.  She does have some moderate left lower quadrant pain today, but no rebound or guarding.  Based on her symptoms, I suspect possible diverticulitis.  Patient is quite financially limited so we will go ahead and empirically treat.  I have also ordered labs as below.  I did have a fairly extended discussion with her about the necessity of seeing gastroenterology and referral has been placed at this time.  We will follow-up via phone visit on Friday.  Patient instructed to go to the emergency department with any worsening symptoms of severe abdominal pain, increasing blood in the stool, fevers or chills.  -     CBC WITH DIFFERENTIAL; Future  -     REFERRAL TO GASTROENTEROLOGY  -     Comp Metabolic Panel; Future  -     ciprofloxacin (CIPRO) 500 MG Tab; Take 1 Tab by mouth 2 times a day for 7 days.  -     metroNIDAZOLE (FLAGYL) 500 MG Tab; Take 1 Tab by mouth 3 times a day for 7 days.    Follow-up via phone visit in 2 days.    Laura Abrams DO  [facility]

## 2020-03-27 ENCOUNTER — OFFICE VISIT (OUTPATIENT)
Dept: MEDICAL GROUP | Facility: PHYSICIAN GROUP | Age: 55
End: 2020-03-27
Payer: MEDICARE

## 2020-03-27 DIAGNOSIS — R10.32 LLQ PAIN: ICD-10-CM

## 2020-03-27 DIAGNOSIS — R19.7 DIARRHEA, UNSPECIFIED TYPE: ICD-10-CM

## 2020-03-27 DIAGNOSIS — K92.1 HEMATOCHEZIA: ICD-10-CM

## 2020-03-27 PROCEDURE — 99441 PR PHYSICIAN TELEPHONE EVALUATION 5-10 MIN: CPT | Performed by: FAMILY MEDICINE

## 2020-04-21 DIAGNOSIS — R05.9 COUGH: ICD-10-CM

## 2020-04-21 DIAGNOSIS — F41.9 ANXIETY: ICD-10-CM

## 2020-04-22 RX ORDER — HYDROXYZINE 50 MG/1
TABLET, FILM COATED ORAL
Qty: 90 TAB | Refills: 1 | Status: SHIPPED | OUTPATIENT
Start: 2020-04-22 | End: 2020-06-18 | Stop reason: SDUPTHER

## 2020-04-22 RX ORDER — ALBUTEROL SULFATE 90 UG/1
AEROSOL, METERED RESPIRATORY (INHALATION)
Qty: 1 INHALER | Refills: 1 | Status: SHIPPED | OUTPATIENT
Start: 2020-04-22 | End: 2020-08-11 | Stop reason: SDUPTHER

## 2020-04-22 NOTE — TELEPHONE ENCOUNTER
Was the patient seen in the last year in this department? Yes    Does patient have an active prescription for medications requested? No     Received Request Via: Pharmacy      Pt met protocol?: Yes   Pt last ov 3/27/20

## 2020-06-15 ENCOUNTER — OFFICE VISIT (OUTPATIENT)
Dept: MEDICAL GROUP | Facility: PHYSICIAN GROUP | Age: 55
End: 2020-06-15
Payer: MEDICARE

## 2020-06-15 VITALS
SYSTOLIC BLOOD PRESSURE: 122 MMHG | WEIGHT: 152 LBS | HEIGHT: 67 IN | DIASTOLIC BLOOD PRESSURE: 64 MMHG | BODY MASS INDEX: 23.86 KG/M2 | TEMPERATURE: 98.4 F | OXYGEN SATURATION: 98 % | HEART RATE: 64 BPM

## 2020-06-15 DIAGNOSIS — Z11.59 NEED FOR HEPATITIS C SCREENING TEST: ICD-10-CM

## 2020-06-15 DIAGNOSIS — R05.3 CHRONIC COUGH: ICD-10-CM

## 2020-06-15 DIAGNOSIS — D75.89 MACROCYTOSIS WITHOUT ANEMIA: ICD-10-CM

## 2020-06-15 DIAGNOSIS — L60.0 INGROWN TOENAIL: ICD-10-CM

## 2020-06-15 DIAGNOSIS — Z12.31 ENCOUNTER FOR SCREENING MAMMOGRAM FOR BREAST CANCER: ICD-10-CM

## 2020-06-15 DIAGNOSIS — F41.9 ANXIETY: ICD-10-CM

## 2020-06-15 DIAGNOSIS — E87.6 HYPOKALEMIA: ICD-10-CM

## 2020-06-15 DIAGNOSIS — I83.93 VARICOSE VEINS OF BOTH LOWER EXTREMITIES, UNSPECIFIED WHETHER COMPLICATED: ICD-10-CM

## 2020-06-15 DIAGNOSIS — R60.0 BILATERAL LOWER EXTREMITY EDEMA: ICD-10-CM

## 2020-06-15 PROCEDURE — 99214 OFFICE O/P EST MOD 30 MIN: CPT | Performed by: FAMILY MEDICINE

## 2020-06-15 RX ORDER — OXYCODONE AND ACETAMINOPHEN 7.5; 325 MG/1; MG/1
1 TABLET ORAL 2 TIMES DAILY PRN
COMMUNITY
End: 2021-09-05

## 2020-06-15 ASSESSMENT — FIBROSIS 4 INDEX: FIB4 SCORE: 1.45

## 2020-06-15 NOTE — PROGRESS NOTES
"CC: Ingrown toenail    HISTORY OF THE PRESENT ILLNESS: Patient is a 54 y.o. female. This pleasant patient is here today for follow-up.    Patient is here today for follow-up.  She reports that she is \"doing well and not doing well\".  She reports significant continued stress regarding her housing here in [place] in addition to stress regarding her 's memory loss.  She has had significant anxiety in the past.  She is currently on buspirone, supposed to be twice daily.  However, patient reports she is only taking the medication once.  Reports she uses the hydroxyzine on a very regular basis.  In the past we trialed her on Zoloft but patient reports that she \"did not like the medication and does not want to take it\".  Does not want to change her current anxiety medications in spite of the fact that anxiety is not well controlled.    Previously referred to podiatry at her last visit for toenail fungus and ingrown toenails.  Referral was closed as patient never scheduled.  Now patient is asking for new referral.    Also has chronic cough and dyspnea with very heavy smoking history.  Pulmonary function testing was ordered at her last visit and again, she did not do it.  Asking for new referral again.    Patient is also asking for referral to a vascular specialist.  She has quite a few varicose veins on her lower extremities and some intermittent lower extremity edema.    She is due for mammography, lab work.  Reports that she is getting colonoscopy done soon.    Allergies: Patient has no known allergies.    Current Outpatient Medications Ordered in Epic   Medication Sig Dispense Refill   • oxyCODONE-acetaminophen (PERCOCET) 7.5-325 MG per tablet Take 1 Tab by mouth 2 times a day as needed.     • hydrOXYzine HCl (ATARAX) 50 MG Tab TAKE 1 TABLET BY MOUTH THREE TIMES DAILY AS NEEDED FOR ITCHING 90 Tab 1   • VENTOLIN  (90 Base) MCG/ACT Aero Soln inhalation aerosol INHALE 2 PUFFS BY MOUTH EVERY 6 HOURS AS NEEDED FOR " "SHORTNESS OF BREATH 1 Inhaler 1   • gabapentin (NEURONTIN) 300 MG Cap Take 300 mg by mouth 3 times a day.     • acetaminophen (TYLENOL) 500 MG Tab Take 1-2 Tabs by mouth every 6 hours as needed. 30 Tab 0   • metoprolol SR (TOPROL XL) 100 MG TABLET SR 24 HR Take 1 Tab by mouth every day. 90 Tab 3   • busPIRone (BUSPAR) 10 MG Tab tablet Take 1 Tab by mouth 2 times a day. 180 Tab 3   • traZODone (DESYREL) 150 MG Tab TAKE 1 TABLET BY MOUTH AT BEDTIME 90 Tab 3   • valacyclovir (VALTREX) 1 GM Tab Take 0.5 Tabs by mouth 2 times a day. 30 Tab 11     No current [facility]-ordered facility-administered medications on file.        Past Medical History:   Diagnosis Date   • Anxiety    • Chronic back pain    • PVC (premature ventricular contraction)        Past Surgical History:   Procedure Laterality Date   • KNEE ARTHROSCOPY     • OPEN REDUCTION     • PB ENLARGE BREAST WITH IMPLANT     • PRIMARY C SECTION     • TONSILLECTOMY         Social History     Tobacco Use   • Smoking status: Current Every Day Smoker     Packs/day: 0.50   • Smokeless tobacco: Never Used   Substance Use Topics   • Alcohol use: No   • Drug use: Yes     Types: Inhaled, Marijuana     Comment: Usually every day, has not smoked since Sunday       Social History     Social History Narrative   • Not on file       Family History   Problem Relation Age of Onset   • Heart Disease Mother    • Diabetes Mother    • Psychiatric Illness Father        ROS:     - Constitutional: Negative for fever, chills, unexpected weight change, and fatigue/generalized weakness.     - Respiratory: Positive for chronic dyspnea, wheezing and cough.    - Cardiovascular: Negative for chest pain, palpitations, orthopnea, PND, and bilateral lower extremity edema.     Exam: /64 (BP Location: Left arm, Patient Position: Sitting, BP Cuff Size: Adult)   Pulse 64   Temp 36.9 °C (98.4 °F) (Temporal)   Ht 1.702 m (5' 7\")   Wt 68.9 kg (152 lb)   SpO2 98%  Body mass index is 23.81 " kg/m².    General: Well appearing, NAD  Pulmonary: Diminished breath sounds throughout with occasional intermittent expiratory wheeze.  Normal effort.   Cardiovascular: Regular rate and rhythm without murmur, rubs or gallop.   Skin: Warm and dry.  No obvious lesions.  Musculoskeletal:  No extremity cyanosis, clubbing, or edema.  Psych: Anxious and tangential.  Alert and oriented. Judgment and insight is normal.    Please note that this dictation was created using voice recognition software. I have made every reasonable attempt to correct obvious errors, but I expect that there are errors of grammar and possibly content that I did not discover before finalizing the note.      Assessment/Plan  Arabella was seen today for follow-up, medication refill and referral needed.    Diagnoses and all orders for this visit:    Chronic cough  Chronic issue, had chest x-ray in February 2020 which was normal.  Pulmonary function test have been ordered.  I strongly suspect COPD based on lung exam and smoking history.  Again these test are reordered since patient did not pursue them the first time.  -     PULMONARY FUNCTION TESTS -Test requested: Complete Pulmonary Function Test; Future    Ingrown toenail  Chronic issue, patient again referred back to podiatry.  -     REFERRAL TO PODIATRY    Varicose veins of both lower extremities, unspecified whether complicated  Chronic issue, referral to vascular surgery placed.  -     REFERRAL TO VASCULAR SURGERY    Bilateral lower extremity edema  Chronic issue, due for lab work at this time anyhow and referred to vascular surgery as above.  -     CBC WITH DIFFERENTIAL; Future  -     Comp Metabolic Panel; Future  -     TSH+FREE T4  -     REFERRAL TO VASCULAR SURGERY    Macrocytosis without anemia  Chronic issue, due for repeat CBC.  -     CBC WITH DIFFERENTIAL; Future    Hypokalemia  Previous issue, CMP ordered.  -     Comp Metabolic Panel; Future    Need for hepatitis C screening test  -     HCV  Agnes ( 7911-5673 [employer]); Future    Anxiety  Chronic issue, not well controlled.  Patient not interested in pursuing different treatment other than her buspirone and hydroxyzine at this time.  She has been referred to behavioral health in the past but never followed up.    Encounter for screening mammogram for breast cancer  -     MA-SCREENING MAMMO BILAT W/TOMOSYNTHESIS W/CAD; Future      Follow-up in 3 months or sooner if needed.    Laura Abrams DO  [facility]

## 2020-06-18 DIAGNOSIS — F41.9 ANXIETY: ICD-10-CM

## 2020-06-22 RX ORDER — HYDROXYZINE 50 MG/1
TABLET, FILM COATED ORAL
Qty: 90 TAB | Refills: 0 | Status: SHIPPED | OUTPATIENT
Start: 2020-06-22 | End: 2020-09-16 | Stop reason: SDUPTHER

## 2020-06-26 ENCOUNTER — HOSPITAL ENCOUNTER (EMERGENCY)
Facility: MEDICAL CENTER | Age: 55
End: 2020-06-26
Attending: EMERGENCY MEDICINE
Payer: MEDICARE

## 2020-06-26 VITALS
OXYGEN SATURATION: 93 % | RESPIRATION RATE: 18 BRPM | BODY MASS INDEX: 23.84 KG/M2 | WEIGHT: 151.9 LBS | DIASTOLIC BLOOD PRESSURE: 53 MMHG | HEART RATE: 78 BPM | TEMPERATURE: 98.6 F | HEIGHT: 67 IN | SYSTOLIC BLOOD PRESSURE: 116 MMHG

## 2020-06-26 DIAGNOSIS — R10.13 EPIGASTRIC ABDOMINAL PAIN: ICD-10-CM

## 2020-06-26 DIAGNOSIS — R11.2 NON-INTRACTABLE VOMITING WITH NAUSEA, UNSPECIFIED VOMITING TYPE: ICD-10-CM

## 2020-06-26 LAB
ALBUMIN SERPL BCP-MCNC: 4.5 G/DL (ref 3.2–4.9)
ALBUMIN/GLOB SERPL: 1.7 G/DL
ALP SERPL-CCNC: 58 U/L (ref 30–99)
ALT SERPL-CCNC: 15 U/L (ref 2–50)
ANION GAP SERPL CALC-SCNC: 15 MMOL/L (ref 7–16)
AST SERPL-CCNC: 20 U/L (ref 12–45)
BASOPHILS # BLD AUTO: 0.4 % (ref 0–1.8)
BASOPHILS # BLD: 0.03 K/UL (ref 0–0.12)
BILIRUB SERPL-MCNC: 0.5 MG/DL (ref 0.1–1.5)
BUN SERPL-MCNC: 17 MG/DL (ref 8–22)
CALCIUM SERPL-MCNC: 9.2 MG/DL (ref 8.5–10.5)
CHLORIDE SERPL-SCNC: 106 MMOL/L (ref 96–112)
CO2 SERPL-SCNC: 19 MMOL/L (ref 20–33)
CREAT SERPL-MCNC: 0.87 MG/DL (ref 0.5–1.4)
EKG IMPRESSION: NORMAL
EOSINOPHIL # BLD AUTO: 0.01 K/UL (ref 0–0.51)
EOSINOPHIL NFR BLD: 0.1 % (ref 0–6.9)
ERYTHROCYTE [DISTWIDTH] IN BLOOD BY AUTOMATED COUNT: 41.5 FL (ref 35.9–50)
GLOBULIN SER CALC-MCNC: 2.6 G/DL (ref 1.9–3.5)
GLUCOSE SERPL-MCNC: 135 MG/DL (ref 65–99)
HCT VFR BLD AUTO: 43.4 % (ref 37–47)
HGB BLD-MCNC: 15.2 G/DL (ref 12–16)
IMM GRANULOCYTES # BLD AUTO: 0.02 K/UL (ref 0–0.11)
IMM GRANULOCYTES NFR BLD AUTO: 0.3 % (ref 0–0.9)
LIPASE SERPL-CCNC: 23 U/L (ref 11–82)
LYMPHOCYTES # BLD AUTO: 0.91 K/UL (ref 1–4.8)
LYMPHOCYTES NFR BLD: 12.4 % (ref 22–41)
MCH RBC QN AUTO: 32.9 PG (ref 27–33)
MCHC RBC AUTO-ENTMCNC: 35 G/DL (ref 33.6–35)
MCV RBC AUTO: 93.9 FL (ref 81.4–97.8)
MONOCYTES # BLD AUTO: 0.29 K/UL (ref 0–0.85)
MONOCYTES NFR BLD AUTO: 3.9 % (ref 0–13.4)
NEUTROPHILS # BLD AUTO: 6.1 K/UL (ref 2–7.15)
NEUTROPHILS NFR BLD: 82.9 % (ref 44–72)
NRBC # BLD AUTO: 0 K/UL
NRBC BLD-RTO: 0 /100 WBC
PLATELET # BLD AUTO: 208 K/UL (ref 164–446)
PMV BLD AUTO: 10.2 FL (ref 9–12.9)
POTASSIUM SERPL-SCNC: 3.7 MMOL/L (ref 3.6–5.5)
PROT SERPL-MCNC: 7.1 G/DL (ref 6–8.2)
RBC # BLD AUTO: 4.62 M/UL (ref 4.2–5.4)
SODIUM SERPL-SCNC: 140 MMOL/L (ref 135–145)
WBC # BLD AUTO: 7.4 K/UL (ref 4.8–10.8)

## 2020-06-26 PROCEDURE — 99285 EMERGENCY DEPT VISIT HI MDM: CPT

## 2020-06-26 PROCEDURE — 85025 COMPLETE CBC W/AUTO DIFF WBC: CPT

## 2020-06-26 PROCEDURE — 93005 ELECTROCARDIOGRAM TRACING: CPT | Performed by: EMERGENCY MEDICINE

## 2020-06-26 PROCEDURE — 700105 HCHG RX REV CODE 258: Performed by: EMERGENCY MEDICINE

## 2020-06-26 PROCEDURE — 96374 THER/PROPH/DIAG INJ IV PUSH: CPT

## 2020-06-26 PROCEDURE — 83690 ASSAY OF LIPASE: CPT

## 2020-06-26 PROCEDURE — 700111 HCHG RX REV CODE 636 W/ 250 OVERRIDE (IP): Performed by: EMERGENCY MEDICINE

## 2020-06-26 PROCEDURE — 80053 COMPREHEN METABOLIC PANEL: CPT

## 2020-06-26 PROCEDURE — 96375 TX/PRO/DX INJ NEW DRUG ADDON: CPT

## 2020-06-26 RX ORDER — METOCLOPRAMIDE HYDROCHLORIDE 5 MG/ML
10 INJECTION INTRAMUSCULAR; INTRAVENOUS ONCE
Status: COMPLETED | OUTPATIENT
Start: 2020-06-26 | End: 2020-06-26

## 2020-06-26 RX ORDER — ONDANSETRON 4 MG/1
4 TABLET, FILM COATED ORAL EVERY 8 HOURS PRN
Qty: 10 EACH | Refills: 1 | Status: SHIPPED | OUTPATIENT
Start: 2020-06-26 | End: 2021-01-08

## 2020-06-26 RX ORDER — SODIUM CHLORIDE, SODIUM LACTATE, POTASSIUM CHLORIDE, CALCIUM CHLORIDE 600; 310; 30; 20 MG/100ML; MG/100ML; MG/100ML; MG/100ML
1000 INJECTION, SOLUTION INTRAVENOUS ONCE
Status: COMPLETED | OUTPATIENT
Start: 2020-06-26 | End: 2020-06-26

## 2020-06-26 RX ORDER — LORAZEPAM 2 MG/ML
1 INJECTION INTRAMUSCULAR ONCE
Status: COMPLETED | OUTPATIENT
Start: 2020-06-26 | End: 2020-06-26

## 2020-06-26 RX ORDER — HALOPERIDOL 5 MG/ML
5 INJECTION INTRAMUSCULAR ONCE
Status: COMPLETED | OUTPATIENT
Start: 2020-06-26 | End: 2020-06-26

## 2020-06-26 RX ORDER — ONDANSETRON 2 MG/ML
4 INJECTION INTRAMUSCULAR; INTRAVENOUS ONCE
Status: COMPLETED | OUTPATIENT
Start: 2020-06-26 | End: 2020-06-26

## 2020-06-26 RX ADMIN — METOCLOPRAMIDE 10 MG: 5 INJECTION, SOLUTION INTRAMUSCULAR; INTRAVENOUS at 13:57

## 2020-06-26 RX ADMIN — SODIUM CHLORIDE, POTASSIUM CHLORIDE, SODIUM LACTATE AND CALCIUM CHLORIDE 1000 ML: 600; 310; 30; 20 INJECTION, SOLUTION INTRAVENOUS at 10:51

## 2020-06-26 RX ADMIN — HALOPERIDOL LACTATE 5 MG: 5 INJECTION, SOLUTION INTRAMUSCULAR at 10:47

## 2020-06-26 RX ADMIN — LORAZEPAM 1 MG: 2 INJECTION INTRAMUSCULAR; INTRAVENOUS at 10:47

## 2020-06-26 RX ADMIN — ONDANSETRON 4 MG: 2 INJECTION INTRAMUSCULAR; INTRAVENOUS at 10:48

## 2020-06-26 ASSESSMENT — LIFESTYLE VARIABLES: DO YOU DRINK ALCOHOL: NO

## 2020-06-26 ASSESSMENT — FIBROSIS 4 INDEX: FIB4 SCORE: 1.45

## 2020-06-26 NOTE — ED NOTES
Pt attempted to leave with IV in place, Pt stated she wanted to leave now. Pt Given discharge instructions/ prescriptions/ home care instructions, Pt verbalized understanding of instructions given, pt ambulatory to [facility].

## 2020-06-26 NOTE — ED TRIAGE NOTES
Chief Complaint   Patient presents with   • N/V     Pt bib ems from home for complaint of N/V since this morning at 0500hrs. Pt Has history of diverticulitis, and marijuana hyperemesis syndrome. Pt given 100mcg fentynl and 4mg zofran IV PTA.    • Bradycardia     Pt noted to be bradycardiac in 40's upon arrival.

## 2020-06-26 NOTE — ED PROVIDER NOTES
ED Provider Note    CHIEF COMPLAINT  Chief Complaint   Patient presents with   • N/V     Pt bib ems from home for complaint of N/V since this morning at 0500hrs. Pt Has history of diverticulitis, and marijuana hyperemesis syndrome. Pt given 100mcg fentynl and 4mg zofran IV PTA.    • Bradycardia     Pt noted to be bradycardiac in 40's upon arrival.        HPI  Arabella Anglin is a 54 y.o. female with a history of cannabinoid hyperemesis syndrome, diverticulitis, chronic back pain who presents with complaints of intractable nausea, vomiting, and epigastric abdominal pain since 5:00 this morning.  The patient has a history of similar episodes in the past, last occurring about 1 year ago.  The patient says she continues smoke marijuana, but has not had any symptoms until today.  She has not been to keep any food or fluids down.  She has had no blood in her emesis or stools.  She is also complaining of pain to the upper abdomen worse with vomiting.  She denies fever, shaking chills, sore throat, cough, congestion, any difficulty breathing.  She has had no diarrhea, bloody stools or black stools.    REVIEW OF SYSTEMS  See HPI for further details. All other systems are negative.     PAST MEDICAL HISTORY  Past Medical History:   Diagnosis Date   • Anxiety    • Chronic back pain    • PVC (premature ventricular contraction)        FAMILY HISTORY  Family History   Problem Relation Age of Onset   • Heart Disease Mother    • Diabetes Mother    • Psychiatric Illness Father        SOCIAL HISTORY  Social History     Tobacco Use   • Smoking status: Current Every Day Smoker     Packs/day: 0.50   • Smokeless tobacco: Never Used   Substance Use Topics   • Alcohol use: No   • Drug use: Yes     Types: Inhaled, Marijuana     Comment: Usually every day, has not smoked since Sunday      Social History     Substance and Sexual Activity   Drug Use Yes   • Types: Inhaled, Marijuana    Comment: Usually every day, has not smoked since  "Sunday       SURGICAL HISTORY  Past Surgical History:   Procedure Laterality Date   • KNEE ARTHROSCOPY     • OPEN REDUCTION     • PB ENLARGE BREAST WITH IMPLANT     • PRIMARY C SECTION     • TONSILLECTOMY         CURRENT MEDICATIONS  Home Medications    **Home medications have not yet been reviewed for this encounter**         ALLERGIES  No Known Allergies    PHYSICAL EXAM0  VITAL SIGNS: Blood Pressure 116/53   Pulse 78   Temperature 37 °C (98.6 °F) (Temporal)   Respiration 18   Height 1.702 m (5' 7\")   Weight 68.9 kg (151 lb 14.4 oz)   Oxygen Saturation 93%   Body Mass Index 23.79 kg/m²   Constitutional: Awake, alert, actively vomiting clear liquid and phlegm, and dry heaving.  HENT: Atraumatic. Bilateral external ears normal, mucous membranes moist, throat nonerythematous without exudates, nose is normal.  Eyes: PERRL, EOMI, conjunctiva moist, noninjected.  Neck: Nontender, Normal range of motion, No nuchal rigidity, No stridor.   Lymphatic: No lymphadenopathy noted.   Cardiovascular: Regular rhythm, tachycardic, rate in the 100s, no murmurs, rubs, gallops.  Thorax & Lungs:  Good breath sounds bilaterally, no wheezes, rales, or retractions.  No chest tenderness.  Abdomen: Bowel sounds normal, Soft, nondistended, there is tenderness to the epigastrium, no tenderness to the right upper quadrant, no Kenney sign, no tenderness to lower abdomen, no rebound, guarding, masses.  Back: No CVA or spinal tenderness.  Extremities: Intact distal pulses, No edema, No tenderness.   Skin: Warm, Dry, No rashes.   Musculoskeletal: No joint swelling or tenderness.  Neurologic: Alert & oriented x 3, sensory and motor function normal. No focal deficits.   Psychiatric: Affect normal, Judgment normal, Mood normal.     EKG  EKG Interpretation:  Interpreted by me    Rhythm: Sinus bradycardia  Rate: 45  Intervals: Normal  Axis: normal  Ectopy: none  Conduction: normal  ST Segments: no evidence of elevation or depression  T Waves: no " acute change  Q Waves: none  Clinical Impression: No acute injury or ischemic pattern.   Comparison to previous EKG from February 2019 shows there are no acute changes.      LABS  Labs Reviewed   CBC WITH DIFFERENTIAL - Abnormal; Notable for the following components:       Result Value    Neutrophils-Polys 82.90 (*)     Lymphocytes 12.40 (*)     Lymphs (Absolute) 0.91 (*)     All other components within normal limits   COMP METABOLIC PANEL - Abnormal; Notable for the following components:    Co2 19 (*)     Glucose 135 (*)     All other components within normal limits   LIPASE   ESTIMATED GFR       All labs reviewed by me.      RADIOLOGY/PROCEDURES  No orders to display       The radiologist's interpretations of all radiological studies have been reviewed by me.        COURSE & MEDICAL DECISION MAKING  Pertinent Labs & Imaging studies reviewed. (See chart for details)  The patient presents with the above complaints.  She was actively vomiting on presentation.  IV is placed, she was given a liter bolus of lactated Ringer's.  Because of her history of cyclic vomiting, she was given Haldol 5 mg, Benadryl 25 mg, Ativan 1 mg IV.  EKG was obtained prior to administration of the medications and showed no elongation of the QTc interval.    CBC was normal with a white count 7400, 83% polys, chemistry shows a CO2 of 19, and a gap 15, glucose 135, otherwise normal, lipase normal.  EKG shows a sinus bradycardia, but the patient is on metoprolol which is likely the cause.  He shows no evidence of sepsis, has no white count, no fever.  She does have some mild dehydration with elevated anion gap.  Patient was noted to be resting comfortably with no further vomiting.  She was given ice water which she tolerated without problems.  She was then given some apple juice and subsequently had an episode of vomiting.  She was given additional Reglan 10 mg IV.  Plan was to admit the patient however after word she was said she was feeling  much better and wanted to be discharged home.  The patient will be prescribed Zofran for any further nausea.  She is told to drink plenty of fluids stay on a bland diet.  She is to return to the ER for any recurrent vomiting, worsening pain, fever, bloody stools or black stools, or any other problems.        FINAL IMPRESSION  1. Non-intractable vomiting with nausea, unspecified vomiting type    2. Epigastric abdominal pain          Electronically signed by: Charly Pinedo M.D., 6/26/2020 1:36 PM

## 2020-08-11 DIAGNOSIS — R05.9 COUGH: ICD-10-CM

## 2020-08-11 RX ORDER — ALBUTEROL SULFATE 90 UG/1
AEROSOL, METERED RESPIRATORY (INHALATION)
Qty: 1 EACH | Refills: 5 | Status: SHIPPED | OUTPATIENT
Start: 2020-08-11 | End: 2021-08-16

## 2020-08-27 ENCOUNTER — OFFICE VISIT (OUTPATIENT)
Dept: URGENT CARE | Facility: CLINIC | Age: 55
End: 2020-08-27
Payer: MEDICARE

## 2020-08-27 VITALS
SYSTOLIC BLOOD PRESSURE: 120 MMHG | BODY MASS INDEX: 22.29 KG/M2 | WEIGHT: 142 LBS | TEMPERATURE: 97.9 F | HEART RATE: 70 BPM | HEIGHT: 67 IN | OXYGEN SATURATION: 97 % | DIASTOLIC BLOOD PRESSURE: 70 MMHG | RESPIRATION RATE: 16 BRPM

## 2020-08-27 DIAGNOSIS — K04.7 DENTAL ABSCESS: ICD-10-CM

## 2020-08-27 PROCEDURE — 99214 OFFICE O/P EST MOD 30 MIN: CPT | Performed by: FAMILY MEDICINE

## 2020-08-27 RX ORDER — AMOXICILLIN AND CLAVULANATE POTASSIUM 875; 125 MG/1; MG/1
1 TABLET, FILM COATED ORAL 2 TIMES DAILY
Qty: 14 TAB | Refills: 0 | Status: SHIPPED | OUTPATIENT
Start: 2020-08-27 | End: 2020-09-03

## 2020-08-27 ASSESSMENT — FIBROSIS 4 INDEX: FIB4 SCORE: 1.37

## 2020-08-28 NOTE — PROGRESS NOTES
Subjective:      Chief Complaint   Patient presents with   • Dental Pain     abcess tooth               Dental Pain - upper left molar  This is a new problem. The current episode started in the past 7 days. The problem occurs constantly (constant, throbbing). The problem has been worsening. Pertinent negatives include no chills, congestion, fatigue, fever, nausea, visual change or vomiting. Chewing aggravates the symptoms. Pt has tried tylenol for the symptoms - no improvement.         Social History     Tobacco Use   • Smoking status: Current Every Day Smoker     Packs/day: 0.50   • Smokeless tobacco: Never Used   Substance Use Topics   • Alcohol use: No   • Drug use: Yes     Types: Inhaled, Marijuana     Comment: Usually every day, has not smoked since Sunday         Current Outpatient Medications on File Prior to Visit   Medication Sig Dispense Refill   • albuterol (VENTOLIN HFA) 108 (90 Base) MCG/ACT Aero Soln inhalation aerosol INHALE 2 PUFFS BY MOUTH EVERY 6 HOURS AS NEEDED FOR SHORTNESS OF BREATH 1 Each 5   • ondansetron (ZOFRAN) 4 MG Tab tablet Take 1 Tab by mouth every 8 hours as needed for Nausea/Vomiting for up to 10 doses. 10 Each 1   • gabapentin (NEURONTIN) 300 MG Cap Take 300 mg by mouth 3 times a day.     • acetaminophen (TYLENOL) 500 MG Tab Take 1-2 Tabs by mouth every 6 hours as needed. 30 Tab 0   • metoprolol SR (TOPROL XL) 100 MG TABLET SR 24 HR Take 1 Tab by mouth every day. 90 Tab 3   • busPIRone (BUSPAR) 10 MG Tab tablet Take 1 Tab by mouth 2 times a day. 180 Tab 3   • valacyclovir (VALTREX) 1 GM Tab Take 0.5 Tabs by mouth 2 times a day. 30 Tab 11   • hydrOXYzine HCl (ATARAX) 50 MG Tab TAKE 1 TABLET BY MOUTH THREE TIMES DAILY AS NEEDED FOR ITCHING (Patient not taking: Reported on 8/27/2020) 90 Tab 0   • oxyCODONE-acetaminophen (PERCOCET) 7.5-325 MG per tablet Take 1 Tab by mouth 2 times a day as needed.     • traZODone (DESYREL) 150 MG Tab TAKE 1 TABLET BY MOUTH AT BEDTIME (Patient not taking:  "Reported on 8/27/2020) 90 Tab 3     No current facility-administered medications on file prior to visit.          Past Medical History:   Diagnosis Date   • Anxiety    • Chronic back pain    • PVC (premature ventricular contraction)            Review of Systems   Constitutional: Negative for fever, chills and malaise/fatigue.   Eyes: Negative for vision changes, d/c.    Respiratory: Negative for cough and sputum production.    Cardiovascular: Negative for chest pain and palpitations.   Gastrointestinal: Negative for nausea, vomiting, abdominal pain, diarrhea and constipation.   Genitourinary: Negative for dysuria, urgency and frequency.   Skin: Negative for rash or  itching.   Neurological: Negative for dizziness and tingling.   Psychiatric/Behavioral: Negative for depression.   Hematologic/lymphatic - denies bruising or excessive bleeding  All other systems reviewed and are negative.       Objective:     /70   Pulse 70   Temp 36.6 °C (97.9 °F) (Temporal)   Resp 16   Ht 1.702 m (5' 7\")   Wt 64.4 kg (142 lb)   SpO2 97%     Physical Exam   Constitutional: pt is oriented to person, place, and time. Pt appears well-developed. No distress.   HENT:   Head: Normocephalic and atraumatic.   Mouth/Throat:     Gingival swelling and tenderness around 2nd   Upper   Left   molar  Eyes: Conjunctivae are normal. Pupils are equal, round, and reactive to light. No scleral icterus.   Cardiovascular: Normal rate and regular rhythm.    Pulmonary/Chest: Effort normal and breath sounds normal. No respiratory distress. Pt has no wheezes.   Neurological: pt is alert and oriented to person, place, and time. No cranial nerve deficit.   Skin: Skin is warm. He is not diaphoretic. No erythema.   Psychiatric:  behavior is normal.   Nursing note and vitals reviewed.              Assessment/Plan:     1. Dental abscess     - amoxicillin-clavulanate (AUGMENTIN) 875-125 MG Tab; Take 1 Tab by mouth 2 times a day for 7 days.  Dispense: 14 " Tab; Refill: 0      F/u with dentist ASAP

## 2020-09-16 DIAGNOSIS — F41.9 ANXIETY: ICD-10-CM

## 2020-09-16 RX ORDER — VALACYCLOVIR HYDROCHLORIDE 1 G/1
500 TABLET, FILM COATED ORAL 2 TIMES DAILY
Qty: 90 TAB | Refills: 5 | Status: SHIPPED | OUTPATIENT
Start: 2020-09-16 | End: 2021-12-06

## 2020-09-16 RX ORDER — HYDROXYZINE 50 MG/1
TABLET, FILM COATED ORAL
Qty: 90 TAB | Refills: 5 | Status: SHIPPED | OUTPATIENT
Start: 2020-09-16 | End: 2021-06-09

## 2020-11-25 DIAGNOSIS — F41.9 ANXIETY: ICD-10-CM

## 2020-11-25 RX ORDER — TRAZODONE HYDROCHLORIDE 150 MG/1
TABLET ORAL
Qty: 90 TAB | Refills: 3 | Status: SHIPPED | OUTPATIENT
Start: 2020-11-25 | End: 2021-10-18

## 2020-12-18 ENCOUNTER — OFFICE VISIT (OUTPATIENT)
Dept: URGENT CARE | Facility: PHYSICIAN GROUP | Age: 55
End: 2020-12-18
Payer: MEDICARE

## 2020-12-18 VITALS
RESPIRATION RATE: 16 BRPM | OXYGEN SATURATION: 98 % | HEIGHT: 67 IN | BODY MASS INDEX: 22.44 KG/M2 | WEIGHT: 143 LBS | DIASTOLIC BLOOD PRESSURE: 80 MMHG | HEART RATE: 58 BPM | SYSTOLIC BLOOD PRESSURE: 126 MMHG | TEMPERATURE: 98.1 F

## 2020-12-18 DIAGNOSIS — S40.021A SUPERFICIAL BRUISING OF ARM, RIGHT, INITIAL ENCOUNTER: ICD-10-CM
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
## 2020-12-18 PROCEDURE — 99213 OFFICE O/P EST LOW 20 MIN: CPT | Performed by: PHYSICIAN ASSISTANT

## 2020-12-18 RX ORDER — LORAZEPAM 1 MG/1
2 TABLET ORAL
COMMUNITY
Start: 2020-12-13 | End: 2020-12-23

## 2020-12-18 RX ORDER — FAMOTIDINE 20 MG/1
20 TABLET, FILM COATED ORAL
COMMUNITY
Start: 2020-12-10 | End: 2021-01-08 | Stop reason: SDUPTHER

## 2020-12-18 RX ORDER — SUCRALFATE 1 G/1
1 TABLET ORAL
COMMUNITY
Start: 2020-12-10 | End: 2021-12-10

## 2020-12-18 ASSESSMENT — ENCOUNTER SYMPTOMS
HEADACHES: 0
NECK PAIN: 1

## 2020-12-18 ASSESSMENT — FIBROSIS 4 INDEX: FIB4 SCORE: 1.390301714023175292
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
## 2020-12-28 DIAGNOSIS — I49.3 PVC'S (PREMATURE VENTRICULAR CONTRACTIONS): ICD-10-CM

## 2020-12-29 RX ORDER — METOPROLOL SUCCINATE 100 MG/1
100 TABLET, EXTENDED RELEASE ORAL DAILY
Qty: 90 TAB | Refills: 1 | Status: SHIPPED | OUTPATIENT
Start: 2020-12-29 | End: 2021-07-06

## 2020-12-29 NOTE — TELEPHONE ENCOUNTER
Refill X 6 months, sent to pharmacy.Pt. Seen in the last 6 months per protocol.   Lab Results   Component Value Date/Time    SODIUM 140 06/26/2020 10:38 AM    POTASSIUM 3.7 06/26/2020 10:38 AM    CHLORIDE 106 06/26/2020 10:38 AM    CO2 19 (L) 06/26/2020 10:38 AM    GLUCOSE 135 (H) 06/26/2020 10:38 AM    BUN 17 06/26/2020 10:38 AM    CREATININE 0.87 06/26/2020 10:38 AM

## 2021-01-08 ENCOUNTER — OFFICE VISIT (OUTPATIENT)
Dept: MEDICAL GROUP | Facility: PHYSICIAN GROUP | Age: 56
End: 2021-01-08
Payer: MEDICARE

## 2021-01-08 VITALS
TEMPERATURE: 98 F | HEART RATE: 54 BPM | SYSTOLIC BLOOD PRESSURE: 130 MMHG | BODY MASS INDEX: 23.54 KG/M2 | WEIGHT: 150 LBS | DIASTOLIC BLOOD PRESSURE: 72 MMHG | OXYGEN SATURATION: 99 % | HEIGHT: 67 IN

## 2021-01-08 DIAGNOSIS — R10.13 EPIGASTRIC PAIN: ICD-10-CM

## 2021-01-08 DIAGNOSIS — Z12.31 ENCOUNTER FOR SCREENING MAMMOGRAM FOR MALIGNANT NEOPLASM OF BREAST: ICD-10-CM

## 2021-01-08 DIAGNOSIS — R11.2 CANNABINOID HYPEREMESIS SYNDROME: ICD-10-CM

## 2021-01-08 DIAGNOSIS — F12.90 CANNABINOID HYPEREMESIS SYNDROME: ICD-10-CM

## 2021-01-08 DIAGNOSIS — R11.0 CHRONIC NAUSEA: ICD-10-CM

## 2021-01-08 DIAGNOSIS — Z23 NEED FOR VACCINATION: ICD-10-CM

## 2021-01-08 DIAGNOSIS — Z98.82 BREAST IMPLANT STATUS: ICD-10-CM

## 2021-01-08 PROCEDURE — 99214 OFFICE O/P EST MOD 30 MIN: CPT | Mod: 25 | Performed by: FAMILY MEDICINE

## 2021-01-08 PROCEDURE — 90686 IIV4 VACC NO PRSV 0.5 ML IM: CPT | Performed by: FAMILY MEDICINE

## 2021-01-08 PROCEDURE — 90732 PPSV23 VACC 2 YRS+ SUBQ/IM: CPT | Performed by: FAMILY MEDICINE

## 2021-01-08 PROCEDURE — G0008 ADMIN INFLUENZA VIRUS VAC: HCPCS | Performed by: FAMILY MEDICINE

## 2021-01-08 PROCEDURE — G0009 ADMIN PNEUMOCOCCAL VACCINE: HCPCS | Performed by: FAMILY MEDICINE

## 2021-01-08 RX ORDER — ONDANSETRON 4 MG/1
4 TABLET, ORALLY DISINTEGRATING ORAL EVERY 6 HOURS PRN
Qty: 30 TAB | Refills: 2 | Status: SHIPPED | OUTPATIENT
Start: 2021-01-08

## 2021-01-08 RX ORDER — FAMOTIDINE 20 MG/1
20 TABLET, FILM COATED ORAL 2 TIMES DAILY
Qty: 180 TAB | Refills: 1 | Status: SHIPPED | OUTPATIENT
Start: 2021-01-08

## 2021-01-08 ASSESSMENT — FIBROSIS 4 INDEX: FIB4 SCORE: 1.390301714023175292

## 2021-01-08 ASSESSMENT — PATIENT HEALTH QUESTIONNAIRE - PHQ9: CLINICAL INTERPRETATION OF PHQ2 SCORE: 0
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
## 2021-02-26 ENCOUNTER — OFFICE VISIT (OUTPATIENT)
Dept: URGENT CARE | Facility: PHYSICIAN GROUP | Age: 56
End: 2021-02-26
Payer: MEDICARE

## 2021-02-26 VITALS
WEIGHT: 140 LBS | BODY MASS INDEX: 22.5 KG/M2 | TEMPERATURE: 98.3 F | HEART RATE: 67 BPM | DIASTOLIC BLOOD PRESSURE: 82 MMHG | RESPIRATION RATE: 14 BRPM | SYSTOLIC BLOOD PRESSURE: 118 MMHG | OXYGEN SATURATION: 97 % | HEIGHT: 66 IN

## 2021-02-26 DIAGNOSIS — R06.2 WHEEZING: ICD-10-CM

## 2021-02-26 DIAGNOSIS — J22 LRTI (LOWER RESPIRATORY TRACT INFECTION): ICD-10-CM

## 2021-02-26 PROCEDURE — 99213 OFFICE O/P EST LOW 20 MIN: CPT | Performed by: FAMILY MEDICINE

## 2021-02-26 RX ORDER — AZITHROMYCIN 250 MG/1
TABLET, FILM COATED ORAL
Qty: 6 TABLET | Refills: 0 | Status: SHIPPED | OUTPATIENT
Start: 2021-02-26 | End: 2021-09-05

## 2021-02-26 RX ORDER — BENZONATATE 100 MG/1
100 CAPSULE ORAL 3 TIMES DAILY PRN
Qty: 30 CAPSULE | Refills: 0 | Status: SHIPPED | OUTPATIENT
Start: 2021-02-26 | End: 2021-09-05

## 2021-02-26 RX ORDER — PREDNISONE 20 MG/1
TABLET ORAL
Qty: 10 TABLET | Refills: 0 | Status: SHIPPED | OUTPATIENT
Start: 2021-02-26

## 2021-02-26 ASSESSMENT — FIBROSIS 4 INDEX: FIB4 SCORE: 1.390301714023175292

## 2021-02-26 ASSESSMENT — PAIN SCALES - GENERAL: PAINLEVEL: NO PAIN

## 2021-02-26 NOTE — PATIENT INSTRUCTIONS
Start your oral antibiotic daily as directed for 5 days  Start oral steroid daily as directed for 5 days  Use your albuterol inhaler every 4 hours as needed for wheezing  Cough tablet every 8 hours as needed  Follow up if not significantly improved as expected in 7 days, sooner if any worsening or new symptoms        Bronchitis  Bronchitis is a problem of the air tubes leading to your lungs. This problem makes it hard for air to get in and out of the lungs. You may cough a lot because your air tubes are narrow. Going without care can cause lasting (chronic) bronchitis.  HOME CARE   · Drink enough fluids to keep your pee (urine) clear or pale yellow.  · Use a cool mist humidifier.  · Quit smoking if you smoke. If you keep smoking, the bronchitis might not get better.  · Only take medicine as told by your doctor.  GET HELP RIGHT AWAY IF:   · Coughing keeps you awake.  · You start to wheeze.  · You become more sick or weak.  · You have a hard time breathing or get short of breath.  · You cough up blood.  · Coughing lasts more than 2 weeks.  · You have a fever.  · Your baby is older than 3 months with a rectal temperature of 102° F (38.9° C) or higher.  · Your baby is 3 months old or younger with a rectal temperature of 100.4° F (38° C) or higher.  MAKE SURE YOU:  · Understand these instructions.  · Will watch your condition.  · Will get help right away if you are not doing well or get worse.  Document Released: 06/05/2009 Document Revised: 03/11/2013 Document Reviewed: 11/19/2010  ExitCare® Patient Information ©2014 [place], [facility].    Bronchospasm, Adult  A bronchospasm is a spasm or tightening of the airways going into the lungs. During a bronchospasm breathing becomes more difficult because the airways get smaller. When this happens there can be coughing, a whistling sound when breathing (wheezing), and difficulty breathing. Bronchospasm is often associated with asthma, but not all patients who experience a  bronchospasm have asthma.  What are the causes?  A bronchospasm is caused by inflammation or irritation of the airways. The inflammation or irritation may be triggered by:  · Allergies (such as to animals, pollen, food, or mold). Allergens that cause bronchospasm may cause wheezing immediately after exposure or many hours later.  · Infection. Viral infections are believed to be the most common cause of bronchospasm.  · Exercise.  · Irritants (such as pollution, cigarette smoke, strong odors, aerosol sprays, and paint fumes).  · Weather changes. Winds increase molds and pollens in the air. Rain refreshes the air by washing irritants out. Cold air may cause inflammation.  · Stress and emotional upset.  What are the signs or symptoms?  · Wheezing.  · Excessive nighttime coughing.  · Frequent or severe coughing with a simple cold.  · Chest tightness.  · Shortness of breath.  How is this diagnosed?  Bronchospasm is usually diagnosed through a history and physical exam. Tests, such as chest X-rays, are sometimes done to look for other conditions.  How is this treated?  · Inhaled medicines can be given to open up your airways and help you breathe. The medicines can be given using either an inhaler or a nebulizer machine.  · Corticosteroid medicines may be given for severe bronchospasm, usually when it is associated with asthma.  Follow these instructions at home:  · Always have a plan prepared for seeking medical care. Know when to call your health care provider and local emergency services (911 in the [place] Know where you can access local emergency care.  · Only take medicines as directed by your health care provider.  · If you were prescribed an inhaler or nebulizer machine, ask your health care provider to explain how to use it correctly. Always use a spacer with your inhaler if you were given one.  · It is necessary to remain calm during an attack. Try to relax and breathe more slowly.  · Control your home environment  in the following ways:  ¨ Change your heating and air conditioning filter at least once a month.  ¨ Limit your use of fireplaces and wood stoves.  ¨ Do not smoke and do not allow smoking in your home.  ¨ Avoid exposure to perfumes and fragrances.  ¨ Get rid of pests (such as roaches and mice) and their droppings.  ¨ Throw away plants if you see mold on them.  ¨ Keep your house clean and dust free.  ¨ Replace carpet with wood, tile, or vinyl jesus. Carpet can trap dander and dust.  ¨ Use allergy-proof pillows, mattress covers, and box spring covers.  ¨ Wash bed sheets and blankets every week in hot water and dry them in a dryer.  ¨ Use blankets that are made of polyester or cotton.  ¨ Wash hands frequently.  Contact a health care provider if:  · You have muscle aches.  · You have chest pain.  · The sputum changes from clear or white to yellow, green, gray, or bloody.  · The sputum you cough up gets thicker.  · There are problems that may be related to the medicine you are given, such as a rash, itching, swelling, or trouble breathing.  Get help right away if:  · You have worsening wheezing and coughing even after taking your prescribed medicines.  · You have increased difficulty breathing.  · You develop severe chest pain.  This information is not intended to replace advice given to you by your health care provider. Make sure you discuss any questions you have with your health care provider.  Document Released: 12/20/2004 Document Revised: 05/25/2017 Document Reviewed: 06/09/2014  Elsevier Interactive Patient Education © 2017 [employer]

## 2021-02-26 NOTE — PROGRESS NOTES
"Subjective:      Arabella Anglin is a 55 y.o. female who presents with Shortness of Breath (sx started a few years ago with lung issues. PT states she just found out yesterday her  has pnemonia. Pt states it has been going on for a week. )            This is a new problem.  55-year-old smoker presenting for evaluation of more productive cough for over the past couple of weeks.  Has been using albuterol little more frequently although she has not used it today.  No fever or chills reported.  Her  was just diagnosed with pneumonia.  She continues to smoke.  Denies any other URI symptoms.      Review of Systems   All other systems reviewed and are negative.         Objective:     /82 (BP Location: Right arm, Patient Position: Sitting, BP Cuff Size: Adult)   Pulse 67   Temp 36.8 °C (98.3 °F) (Temporal)   Resp 14   Ht 1.676 m (5' 6\")   Wt 63.5 kg (140 lb)   SpO2 97%   BMI 22.60 kg/m²      Physical Exam  Constitutional:       General: She is not in acute distress.     Appearance: She is not ill-appearing, toxic-appearing or diaphoretic.   HENT:      Head: Normocephalic and atraumatic.      Right Ear: External ear normal.      Left Ear: External ear normal.      Nose: No rhinorrhea.      Mouth/Throat:      Mouth: Mucous membranes are moist.      Pharynx: Oropharynx is clear. No oropharyngeal exudate or posterior oropharyngeal erythema.   Eyes:      Conjunctiva/sclera: Conjunctivae normal.   Cardiovascular:      Rate and Rhythm: Normal rate and regular rhythm.      Heart sounds: No murmur. No friction rub. No gallop.    Pulmonary:      Effort: Pulmonary effort is normal. No respiratory distress.      Breath sounds: No stridor. Wheezing (Few scattered wheezes on inspiration noted in the upper lobe) present. No rhonchi or rales.   Musculoskeletal:      Cervical back: Neck supple.   Lymphadenopathy:      Cervical: No cervical adenopathy.   Skin:     General: Skin is warm.      Coloration: Skin " is not jaundiced or pale.   Neurological:      Mental Status: She is alert and oriented to person, place, and time.   Psychiatric:         Mood and Affect: Mood normal.                 Assessment/Plan:        1. LRTI (lower respiratory tract infection)  - azithromycin (ZITHROMAX) 250 MG Tab; 500mg on day One, then 250mg daily until finished  Dispense: 6 tablet; Refill: 0  - benzonatate (TESSALON) 100 MG Cap; Take 1 capsule by mouth 3 times a day as needed for Cough.  Dispense: 30 capsule; Refill: 0    2. Wheezing  - predniSONE (DELTASONE) 20 MG Tab; Take 40mg daily x 5 days  Dispense: 10 tablet; Refill: 0    She does have albuterol at home so encouraged her to use it more frequently as she does have slightly wheezing chest on inspiration in the upper lobe.  Given her history of tobacco use in the the fact that she is slightly wheezy would recommend a short course of oral steroid plus oral antibiotic to help cover for other bacterial causes.  Offered to get obtain an x-ray but she would like to get it treated if it does not change the management at the present time and I agree to do so.  Azithromycin per orders.  Follow-up in the next 5 to 7 days if not significantly better, sooner if any worsening.  Plan per orders and instructions  Warning signs reviewed

## 2021-03-01 ENCOUNTER — APPOINTMENT (OUTPATIENT)
Dept: RADIOLOGY | Facility: MEDICAL CENTER | Age: 56
End: 2021-03-01
Attending: FAMILY MEDICINE
Payer: MEDICARE

## 2021-03-01 DIAGNOSIS — F41.9 ANXIETY: ICD-10-CM

## 2021-03-02 RX ORDER — BUSPIRONE HYDROCHLORIDE 10 MG/1
10 TABLET ORAL 2 TIMES DAILY
Qty: 180 TABLET | Refills: 1 | Status: SHIPPED | OUTPATIENT
Start: 2021-03-02 | End: 2021-10-18

## 2021-03-24 ENCOUNTER — HOSPITAL ENCOUNTER (OUTPATIENT)
Dept: RADIOLOGY | Facility: MEDICAL CENTER | Age: 56
End: 2021-03-24
Attending: FAMILY MEDICINE
Payer: MEDICARE

## 2021-03-24 DIAGNOSIS — Z12.31 ENCOUNTER FOR SCREENING MAMMOGRAM FOR MALIGNANT NEOPLASM OF BREAST: ICD-10-CM

## 2021-04-07 ENCOUNTER — TELEPHONE (OUTPATIENT)
Dept: MEDICAL GROUP | Facility: PHYSICIAN GROUP | Age: 56
End: 2021-04-07

## 2021-04-07 DIAGNOSIS — N64.89 DISTORTION OF CONTOUR OF BREAST: ICD-10-CM

## 2021-04-07 NOTE — TELEPHONE ENCOUNTER
Patient is concerned about the shape of her left breast. The breast center told her she needs a diagnostic mammogram (with implants). Can you order that please? Thank you.

## 2021-06-09 DIAGNOSIS — F41.9 ANXIETY: ICD-10-CM

## 2021-06-09 RX ORDER — HYDROXYZINE 50 MG/1
TABLET, FILM COATED ORAL
Qty: 90 TABLET | Refills: 5 | Status: SHIPPED | OUTPATIENT
Start: 2021-06-09 | End: 2022-04-11

## 2021-07-03 DIAGNOSIS — I49.3 PVC'S (PREMATURE VENTRICULAR CONTRACTIONS): ICD-10-CM

## 2021-07-06 RX ORDER — METOPROLOL SUCCINATE 100 MG/1
TABLET, EXTENDED RELEASE ORAL
Qty: 90 TABLET | Refills: 1 | Status: SHIPPED | OUTPATIENT
Start: 2021-07-06

## 2021-08-15 DIAGNOSIS — R05.9 COUGH: ICD-10-CM

## 2021-08-16 RX ORDER — ALBUTEROL SULFATE 90 UG/1
AEROSOL, METERED RESPIRATORY (INHALATION)
Qty: 1 EACH | Refills: 5 | Status: SHIPPED | OUTPATIENT
Start: 2021-08-16

## 2021-09-05 ENCOUNTER — OFFICE VISIT (OUTPATIENT)
Dept: URGENT CARE | Facility: PHYSICIAN GROUP | Age: 56
End: 2021-09-05
Payer: MEDICARE

## 2021-09-05 VITALS
DIASTOLIC BLOOD PRESSURE: 88 MMHG | TEMPERATURE: 98.1 F | BODY MASS INDEX: 22.02 KG/M2 | OXYGEN SATURATION: 94 % | WEIGHT: 137 LBS | HEIGHT: 66 IN | HEART RATE: 90 BPM | SYSTOLIC BLOOD PRESSURE: 140 MMHG | RESPIRATION RATE: 16 BRPM

## 2021-09-05 DIAGNOSIS — K04.7 DENTAL INFECTION: ICD-10-CM

## 2021-09-05 PROCEDURE — 99213 OFFICE O/P EST LOW 20 MIN: CPT | Performed by: FAMILY MEDICINE

## 2021-09-05 RX ORDER — GABAPENTIN 800 MG/1
800 TABLET ORAL
COMMUNITY
Start: 2021-03-21

## 2021-09-05 RX ORDER — ALBUTEROL SULFATE 90 UG/1
1-2 AEROSOL, METERED RESPIRATORY (INHALATION)
COMMUNITY
Start: 2021-07-24 | End: 2022-07-24

## 2021-09-05 RX ORDER — METHYL SALICYLATE/MENTHOL
1 CREAM (GRAM) TOPICAL
COMMUNITY

## 2021-09-05 RX ORDER — ONDANSETRON 4 MG/1
4 TABLET, ORALLY DISINTEGRATING ORAL
COMMUNITY
Start: 2021-06-23 | End: 2021-09-05

## 2021-09-05 RX ORDER — LORAZEPAM 1 MG/1
1 TABLET ORAL
COMMUNITY
Start: 2021-04-14

## 2021-09-05 RX ORDER — VALACYCLOVIR HYDROCHLORIDE 1 G/1
1000 TABLET, FILM COATED ORAL
COMMUNITY
End: 2021-09-05

## 2021-09-05 RX ORDER — AMOXICILLIN AND CLAVULANATE POTASSIUM 875; 125 MG/1; MG/1
1 TABLET, FILM COATED ORAL 2 TIMES DAILY
Qty: 14 TABLET | Refills: 0 | Status: SHIPPED | OUTPATIENT
Start: 2021-09-05 | End: 2021-09-12

## 2021-09-05 RX ORDER — METOPROLOL SUCCINATE 100 MG/1
100 TABLET, EXTENDED RELEASE ORAL DAILY
COMMUNITY
End: 2021-09-05

## 2021-09-05 RX ORDER — HYDROXYZINE 50 MG/1
50 TABLET, FILM COATED ORAL
COMMUNITY
End: 2022-04-12

## 2021-09-05 RX ORDER — BUSPIRONE HYDROCHLORIDE 10 MG/1
10 TABLET ORAL
COMMUNITY
End: 2021-09-05

## 2021-09-05 RX ORDER — OXYCODONE AND ACETAMINOPHEN 7.5; 325 MG/1; MG/1
1 TABLET ORAL
COMMUNITY
End: 2021-09-05

## 2021-09-05 ASSESSMENT — ENCOUNTER SYMPTOMS
WEIGHT LOSS: 0
VOMITING: 0
EYE REDNESS: 0
NAUSEA: 0
MYALGIAS: 0
EYE DISCHARGE: 0

## 2021-09-05 ASSESSMENT — FIBROSIS 4 INDEX: FIB4 SCORE: 1.390301714023175292

## 2021-09-05 NOTE — PROGRESS NOTES
"Subjective     Arabella Anglin is a 55 y.o. female who presents with Tooth Ache (poss tooth abscess, R lower jaw pain, swelling, x3 days )            3 days right lower molar toothache.  Severe at times.  Known caries and lost filling.  No significant swelling. Worse with pressure and hot/cold.  No fever no trismus.  Minimal relief with OTC analgesic.  No other aggravating or alleviating factors.      Review of Systems   Constitutional: Negative for malaise/fatigue and weight loss.   Eyes: Negative for discharge and redness.   Gastrointestinal: Negative for nausea and vomiting.   Musculoskeletal: Negative for joint pain and myalgias.   Skin: Negative for itching and rash.              Objective     /88 (BP Location: Left arm, Patient Position: Sitting, BP Cuff Size: Adult)   Pulse 90   Temp 36.7 °C (98.1 °F) (Temporal)   Resp 16   Ht 1.676 m (5' 6\")   Wt 62.1 kg (137 lb)   SpO2 94%   BMI 22.11 kg/m²      Physical Exam  Constitutional:       General: She is not in acute distress.     Appearance: She is well-developed.   HENT:      Head: Normocephalic and atraumatic.      Mouth/Throat:     Eyes:      Conjunctiva/sclera: Conjunctivae normal.   Skin:     General: Skin is warm and dry.      Findings: No rash.   Neurological:      Mental Status: She is alert and oriented to person, place, and time.                             Assessment & Plan         1. Dental infection  amoxicillin-clavulanate (AUGMENTIN) 875-125 MG Tab     Differential diagnosis, natural history, supportive care, and indications for immediate follow-up discussed at length.     F/u dentist              "

## 2021-10-18 DIAGNOSIS — F41.9 ANXIETY: ICD-10-CM

## 2021-10-18 RX ORDER — TRAZODONE HYDROCHLORIDE 150 MG/1
TABLET ORAL
Qty: 90 TABLET | Refills: 0 | Status: SHIPPED | OUTPATIENT
Start: 2021-10-18 | End: 2021-12-20

## 2021-10-18 RX ORDER — BUSPIRONE HYDROCHLORIDE 10 MG/1
TABLET ORAL
Qty: 180 TABLET | Refills: 0 | Status: SHIPPED | OUTPATIENT
Start: 2021-10-18

## 2021-11-09 ENCOUNTER — TELEPHONE (OUTPATIENT)
Dept: MEDICAL GROUP | Facility: PHYSICIAN GROUP | Age: 56
End: 2021-11-09

## 2021-11-09 ENCOUNTER — OFFICE VISIT (OUTPATIENT)
Dept: MEDICAL GROUP | Facility: PHYSICIAN GROUP | Age: 56
End: 2021-11-09
Payer: MEDICARE

## 2021-11-09 ENCOUNTER — HOSPITAL ENCOUNTER (OUTPATIENT)
Facility: MEDICAL CENTER | Age: 56
End: 2021-11-09
Attending: STUDENT IN AN ORGANIZED HEALTH CARE EDUCATION/TRAINING PROGRAM
Payer: MEDICARE

## 2021-11-09 VITALS
HEIGHT: 66 IN | HEART RATE: 46 BPM | SYSTOLIC BLOOD PRESSURE: 100 MMHG | DIASTOLIC BLOOD PRESSURE: 56 MMHG | TEMPERATURE: 98.5 F | OXYGEN SATURATION: 96 % | BODY MASS INDEX: 22.02 KG/M2 | WEIGHT: 137 LBS

## 2021-11-09 DIAGNOSIS — R05.9 COUGH: ICD-10-CM

## 2021-11-09 DIAGNOSIS — Z20.818 EXPOSURE TO STREPTOCOCCAL PHARYNGITIS: ICD-10-CM

## 2021-11-09 PROBLEM — D75.89 MACROCYTOSIS WITHOUT ANEMIA: Status: RESOLVED | Noted: 2019-02-19 | Resolved: 2021-11-09

## 2021-11-09 PROBLEM — R94.4 DECREASED GFR: Status: RESOLVED | Noted: 2019-05-01 | Resolved: 2021-11-09

## 2021-11-09 PROBLEM — R11.10 VOMITING: Status: RESOLVED | Noted: 2019-02-15 | Resolved: 2021-11-09

## 2021-11-09 LAB
COVID ORDER STATUS COVID19: NORMAL
FLUAV+FLUBV AG SPEC QL IA: NORMAL
INT CON NEG: NEGATIVE
INT CON NEG: NEGATIVE
INT CON POS: POSITIVE
INT CON POS: POSITIVE
S PYO AG THROAT QL: NEGATIVE

## 2021-11-09 PROCEDURE — U0005 INFEC AGEN DETEC AMPLI PROBE: HCPCS

## 2021-11-09 PROCEDURE — 87804 INFLUENZA ASSAY W/OPTIC: CPT | Performed by: STUDENT IN AN ORGANIZED HEALTH CARE EDUCATION/TRAINING PROGRAM

## 2021-11-09 PROCEDURE — 87880 STREP A ASSAY W/OPTIC: CPT | Performed by: STUDENT IN AN ORGANIZED HEALTH CARE EDUCATION/TRAINING PROGRAM

## 2021-11-09 PROCEDURE — U0003 INFECTIOUS AGENT DETECTION BY NUCLEIC ACID (DNA OR RNA); SEVERE ACUTE RESPIRATORY SYNDROME CORONAVIRUS 2 (SARS-COV-2) (CORONAVIRUS DISEASE [COVID-19]), AMPLIFIED PROBE TECHNIQUE, MAKING USE OF HIGH THROUGHPUT TECHNOLOGIES AS DESCRIBED BY CMS-2020-01-R: HCPCS

## 2021-11-09 PROCEDURE — 99213 OFFICE O/P EST LOW 20 MIN: CPT | Performed by: STUDENT IN AN ORGANIZED HEALTH CARE EDUCATION/TRAINING PROGRAM

## 2021-11-09 ASSESSMENT — FIBROSIS 4 INDEX: FIB4 SCORE: 1.42

## 2021-11-09 NOTE — LETTER
Arabella Anglin  [address] [place] [address]            11/12/2021        Dear Arabella,      After several attempts, Laura Abrams D.O.'s office has been unable to reach you by phone regarding your recent test results.     We ask that you contact us at [phone] at your earliest convenience. Our office hours are from 8:00a - 5:00p Monday thru Friday.    Kind regards,   Elena Leblanc, Med Ass't

## 2021-11-09 NOTE — TELEPHONE ENCOUNTER
Phone Number Called: [phone] (home)       Call outcome: Did not leave a detailed message. Requested patient to call back.

## 2021-11-09 NOTE — PROGRESS NOTES
Subjective:     CC: Cough x2 days    HPI:   Arabella presents today with cough and congestion x 2 days. PCP Dr. Abrams.    Patient is not vaccinated to COVID-19 or influenza.  She states that she had a recent exposure to someone with strep pharyngitis, the denies any sore throat or fever.  Here today to make sure she does not need antibiotics.  Does endorse nontender cervical lymph nodes recently.  States that the cough started 2 days ago without chest pain or shortness of breath with some scant amount of phlegm production without hemoptysis.  States that she had a left frontal/maxillary headache yesterday that was sharp and now resolved.  States that her nasal congestion is actually improved today.  Denies any body aches, dizziness/lightheadedness, chills, loss of taste or smell, diarrhea, abdominal pain, nausea and vomiting.  States that she has a few contacts with upper respiratory symptoms.  Takes care of her grandchildren and picks them up from school during the week daily.  Does smoke cigarettes, has albuterol at home and not needing more frequently.    Current Outpatient Medications Ordered in Epic   Medication Sig Dispense Refill   • traZODone (DESYREL) 150 MG Tab TAKE 1 TABLET BY MOUTH ONCE DAILY AT BEDTIME 90 Tablet 0   • busPIRone (BUSPAR) 10 MG Tab tablet TAKE 1 TABLET BY MOUTH TWICE DAILY 180 Tablet 0   • LORazepam (ATIVAN) 1 MG Tab Take 1 mg by mouth.     • Menthol-Methyl Salicylate (THERA-GESIC) 0.5-15 % Cream Apply 1 Application topically.     • albuterol 108 (90 Base) MCG/ACT Aero Soln inhalation aerosol Inhale 1-2 Puffs.     • gabapentin (NEURONTIN) 800 MG tablet Take 800 mg by mouth.     • hydrOXYzine HCl (ATARAX) 50 MG Tab Take 50 mg by mouth.     • VENTOLIN  (90 Base) MCG/ACT Aero Soln inhalation aerosol INHALE 2 PUFFS BY MOUTH EVERY 6 HOURS RPF SHORTNESS OF BREATH 1 Each 5   • metoprolol SR (TOPROL XL) 100 MG TABLET SR 24 HR TAKE 1 TABLET BY MOUTH EVERY DAY 90 tablet 1   • hydrOXYzine HCl  "(ATARAX) 50 MG Tab TAKE 1 TABLET BY MOUTH THREE TIMES DAILY AS NEEDED FOR ITCHING 90 tablet 5   • predniSONE (DELTASONE) 20 MG Tab Take 40mg daily x 5 days 10 tablet 0   • ondansetron (ZOFRAN ODT) 4 MG TABLET DISPERSIBLE Take 1 Tab by mouth every 6 hours as needed for Nausea. 30 Tab 2   • famotidine (PEPCID) 20 MG Tab Take 1 Tab by mouth 2 times a day. 180 Tab 1   • sucralfate (CARAFATE) 1 GM Tab Take 1 g by mouth.     • valacyclovir (VALTREX) 1 GM Tab Take 0.5 Tabs by mouth 2 times a day. 90 Tab 5     No current Epic-ordered facility-administered medications on file.     ROS:  See HPI    Objective:     Exam:  /56 (BP Location: Left arm, Patient Position: Sitting, BP Cuff Size: Adult)   Pulse (!) 46   Temp 36.9 °C (98.5 °F) (Temporal)   Ht 1.676 m (5' 6\")   Wt 62.1 kg (137 lb)   SpO2 96%   BMI 22.11 kg/m²  Body mass index is 22.11 kg/m².    Physical Exam:  Constitutional: Well-developed and well-nourished. No acute distress.   Skin: Skin is warm and dry. No rash noted.  Head: Atraumatic without lesions.  Eyes: Conjunctivae and extraocular motions are normal. Pupils are equal, round. No scleral icterus.   Ears:  External ears unremarkable. Tympanic membranes clear and intact.  Nose: Nares patent.  Turbinate edema/erythema, more notable on right. No discharge.  No sinus tenderness.  Mouth/Throat: Oropharynx is clear and moist. Posterior pharynx without erythema or exudates.  Tonsils surgically absent.  Neck: Supple, trachea midline. Normal range of motion. No thyromegaly present.  Bilateral shotty nontender cervical lymphadenopathy.  Cardiovascular: Regular rhythm, bradycardic, S1 and S2 without murmur, rubs, or gallops.  Lungs: Normal inspiratory effort, CTA bilaterally, no wheezes/rhonchi/rales  Extremities: No cyanosis, clubbing, erythema, nor edema.  Neurological: Alert and oriented x 3. No gross/focal deficits.  Psychiatric:  Behavior, mood, and affect are appropriate.    Assessment & Plan:     56 " y.o. female with the following -     1. Cough  This is an acute condition in this chronic tobacco user who is unvaccinated to COVID-19/influenza.  Fortunately no findings suggestive of bacterial infection at this time, reviewed signs and symptoms of common bacterial infections and the return precautions.  Discussed anticipatory guidance for viral infections, may use over-the-counter treatments and saline rinses especially for congestion (already has at home) as needed.  Okay to use albuterol as needed for cough and gave return precautions for shortness of breath.  Aware of benefits of tobacco cessation. Rapid flu negative. Unfortunately rapid Covid test was invalid so we will be sending off the PCR.  Provided her with [facility]COV patient fact sheet in case she were to test positive for Covid given that she is higher risk for severe disease. Recommended isolation until the test is back and symptoms improved without fever x 24hrs.  - POCT Influenza A/B  - SARS-CoV-2, PCR (In-House); Future    2. Exposure to Streptococcal pharyngitis  Reviewed results with patient, rapid strep negative.  - POCT Rapid Strep A    Return if symptoms worsen or fail to improve.    Please note that this dictation was created using voice recognition software. I have made every reasonable attempt to correct obvious errors, but I expect that there are errors of grammar and possibly content that I did not discover before finalizing the note.

## 2021-11-09 NOTE — TELEPHONE ENCOUNTER
----- Message from Barb Marcial D.O. sent at 11/9/2021 12:02 PM PST -----  Please call to let patient that her rapid Covid test was not able to be validated in clinic so we will send the more accurate PCR to the lab. Her flu test was negative.

## 2021-11-10 ENCOUNTER — TELEPHONE (OUTPATIENT)
Dept: MEDICAL GROUP | Facility: PHYSICIAN GROUP | Age: 56
End: 2021-11-10

## 2021-11-10 LAB
SARS-COV-2 RNA RESP QL NAA+PROBE: NOTDETECTED
SPECIMEN SOURCE: NORMAL

## 2021-11-10 NOTE — LETTER
Arabella Anglin  [address] [place] [address]            11/11/2021        Dear Arabella,      After several attempts, Laura Abrams D.O.'s office has been unable to reach you by phone regarding your recent test results.     We ask that you contact us at [phone] at your earliest convenience. Our office hours are from 8:00a - 5:00p Monday thru Friday.    Kind regards,   Elena Leblanc, Med Ass't

## 2021-11-11 NOTE — TELEPHONE ENCOUNTER
----- Message from Barb Marcial D.O. sent at 11/10/2021 11:48 AM PST -----  Please make patient aware that her PCR Covid test was negative.  She still may be contagious from a viral infection standpoint so hand hygiene is important in its reasonable to still isolate until she is improved after 24 hours and has not had any fevers-I did discuss that with her at her appointment.

## 2021-12-06 RX ORDER — VALACYCLOVIR HYDROCHLORIDE 1 G/1
TABLET, FILM COATED ORAL
Qty: 45 TABLET | Refills: 0 | Status: SHIPPED | OUTPATIENT
Start: 2021-12-06

## 2022-03-16 ENCOUNTER — NON-PROVIDER VISIT (OUTPATIENT)
Dept: CARDIOLOGY | Facility: MEDICAL CENTER | Age: 57
End: 2022-03-16
Payer: MEDICARE

## 2022-03-16 NOTE — PROGRESS NOTES
Patient enrolled in 14 day XT/AT  Zio Patch program per Laura Abrams .  In clinic hookup by Tara.  >Currently pending EOS.  Monitor serial number: [id number]

## 2022-03-20 ENCOUNTER — OFFICE VISIT (OUTPATIENT)
Dept: URGENT CARE | Facility: CLINIC | Age: 57
End: 2022-03-20
Payer: MEDICARE

## 2022-03-20 ENCOUNTER — HOSPITAL ENCOUNTER (OUTPATIENT)
Facility: MEDICAL CENTER | Age: 57
End: 2022-03-20
Attending: PHYSICIAN ASSISTANT
Payer: MEDICARE

## 2022-03-20 VITALS
DIASTOLIC BLOOD PRESSURE: 68 MMHG | TEMPERATURE: 96.8 F | HEART RATE: 71 BPM | OXYGEN SATURATION: 98 % | SYSTOLIC BLOOD PRESSURE: 126 MMHG | HEIGHT: 66 IN | RESPIRATION RATE: 24 BRPM | BODY MASS INDEX: 22.5 KG/M2 | WEIGHT: 140 LBS

## 2022-03-20 DIAGNOSIS — J22 LRTI (LOWER RESPIRATORY TRACT INFECTION): ICD-10-CM

## 2022-03-20 DIAGNOSIS — F17.200 TOBACCO DEPENDENCE: ICD-10-CM

## 2022-03-20 LAB — COVID ORDER STATUS COVID19: NORMAL

## 2022-03-20 PROCEDURE — U0005 INFEC AGEN DETEC AMPLI PROBE: HCPCS

## 2022-03-20 PROCEDURE — U0003 INFECTIOUS AGENT DETECTION BY NUCLEIC ACID (DNA OR RNA); SEVERE ACUTE RESPIRATORY SYNDROME CORONAVIRUS 2 (SARS-COV-2) (CORONAVIRUS DISEASE [COVID-19]), AMPLIFIED PROBE TECHNIQUE, MAKING USE OF HIGH THROUGHPUT TECHNOLOGIES AS DESCRIBED BY CMS-2020-01-R: HCPCS

## 2022-03-20 PROCEDURE — 99214 OFFICE O/P EST MOD 30 MIN: CPT | Performed by: PHYSICIAN ASSISTANT

## 2022-03-20 RX ORDER — AZITHROMYCIN 250 MG/1
TABLET, FILM COATED ORAL
Qty: 6 TABLET | Refills: 0 | Status: SHIPPED | OUTPATIENT
Start: 2022-03-20 | End: 2022-03-22 | Stop reason: SDUPTHER

## 2022-03-20 RX ORDER — BENZONATATE 100 MG/1
200 CAPSULE ORAL 3 TIMES DAILY PRN
Qty: 60 CAPSULE | Refills: 0 | Status: SHIPPED | OUTPATIENT
Start: 2022-03-20

## 2022-03-20 RX ORDER — ALBUTEROL SULFATE 90 UG/1
2 AEROSOL, METERED RESPIRATORY (INHALATION) EVERY 6 HOURS PRN
Qty: 8.5 G | Refills: 0 | Status: SHIPPED | OUTPATIENT
Start: 2022-03-20 | End: 2022-06-27 | Stop reason: SDUPTHER

## 2022-03-20 ASSESSMENT — ENCOUNTER SYMPTOMS
HEADACHES: 0
DIZZINESS: 0
PALPITATIONS: 1
COUGH: 1
FEVER: 0
SPUTUM PRODUCTION: 0
NAUSEA: 0
DIAPHORESIS: 1
DIARRHEA: 0
SHORTNESS OF BREATH: 1
VOMITING: 0
WHEEZING: 0
ABDOMINAL PAIN: 0
MYALGIAS: 0
CHILLS: 0
SINUS PAIN: 0
SORE THROAT: 0

## 2022-03-20 ASSESSMENT — FIBROSIS 4 INDEX: FIB4 SCORE: 1.42

## 2022-03-20 NOTE — PROGRESS NOTES
Subjective:   Arabella Anglin is a 56 y.o. female who presents for Cough (Head cold, lung discomfort, congestion, SOB, little runny nose, night coughs, wants a COVID x 2 weeks)      HPI:  This is a very pleasant 56-year-old female presenting to the clinic with cough and congestion x2 weeks.  Patient states her symptoms have gradually migrated down into her chest.  She has been coughing frequently and producing discolored sputum.  She does describe intermittent bouts of shortness of breath. States she has borderline COPD.  Occasionally uses albuterol as needed which provides mild improvement.  Used to have a steroid inhaler however has not been using this recently.  She has not been running a fever.  Denies any body aches or chills.  Occasionally has night sweats however this is a chronic issue for her.  Multiple household members are experiencing similar symptoms at this time.  She has been trying OTC cough and cold medication with no improvement.    Review of Systems   Constitutional: Positive for diaphoresis (chronic night sweats) and malaise/fatigue. Negative for chills and fever.   HENT: Positive for congestion. Negative for ear pain, sinus pain and sore throat.    Respiratory: Positive for cough and shortness of breath. Negative for sputum production and wheezing.    Cardiovascular: Positive for palpitations (currently wearing holter monitor). Negative for chest pain.   Gastrointestinal: Negative for abdominal pain, diarrhea, nausea and vomiting.   Musculoskeletal: Negative for myalgias.   Neurological: Negative for dizziness and headaches.       Medications:    • albuterol Aers  • azithromycin Tabs  • benzonatate Caps  • busPIRone Tabs  • famotidine Tabs  • fluticasone-salmeterol Aepb  • gabapentin  • hydrOXYzine HCl Tabs  • LORazepam Tabs  • metoprolol SR Tb24  • ondansetron Tbdp  • predniSONE Tabs  • Thera-Gesic Crea  • traZODone Tabs  • valacyclovir Tabs  • Ventolin HFA Aers    Allergies: Patient has  "no known allergies.    Problem List: Arabella Anglin does not have any pertinent problems on file.    Surgical History:  Past Surgical History:   Procedure Laterality Date   • KNEE ARTHROSCOPY     • OPEN REDUCTION     • MD BREAST AUGMENTATION WITH IMPLANT     • PRIMARY C SECTION     • TONSILLECTOMY         Past Social Hx: Arabella Anglin  reports that she has been smoking cigarettes. She has been smoking about 0.50 packs per day. She has never used smokeless tobacco. She reports current drug use. Drugs: Inhaled and Marijuana. She reports that she does not drink alcohol.     Past Family Hx:  Arabella Anglin family history includes Diabetes in her mother; Heart Disease in her mother; Psychiatric Illness in her father.     Problem list, medications, and allergies reviewed by myself today in Epic.     Objective:     /68 (BP Location: Right arm, Patient Position: Sitting, BP Cuff Size: Adult)   Pulse 71   Temp 36 °C (96.8 °F) (Temporal)   Resp (!) 24   Ht 1.676 m (5' 6\")   Wt 63.5 kg (140 lb)   SpO2 98%   BMI 22.60 kg/m²     Physical Exam  Constitutional:       General: She is not in acute distress.     Appearance: Normal appearance. She is not ill-appearing, toxic-appearing or diaphoretic.   HENT:      Head: Normocephalic and atraumatic.      Right Ear: Tympanic membrane, ear canal and external ear normal.      Left Ear: Tympanic membrane, ear canal and external ear normal.      Nose: Congestion and rhinorrhea present.      Mouth/Throat:      Mouth: Mucous membranes are moist.      Pharynx: No oropharyngeal exudate or posterior oropharyngeal erythema.   Eyes:      Conjunctiva/sclera: Conjunctivae normal.   Cardiovascular:      Rate and Rhythm: Normal rate and regular rhythm.      Pulses: Normal pulses.      Heart sounds: Normal heart sounds.   Pulmonary:      Effort: Pulmonary effort is normal.      Breath sounds: Normal breath sounds. No wheezing, rhonchi or rales.      Comments: Frequent " dry cough throughout exam  Musculoskeletal:      Cervical back: Normal range of motion. No muscular tenderness.   Lymphadenopathy:      Cervical: No cervical adenopathy.   Skin:     General: Skin is warm and dry.      Capillary Refill: Capillary refill takes less than 2 seconds.   Neurological:      Mental Status: She is alert.   Psychiatric:         Mood and Affect: Mood normal.         Thought Content: Thought content normal.           Assessment/Plan:     Comments/MDM:     • Due to the nature and length of the illness we will begin antibiotic treatment at this time.  History of borderline COPD.  Refilled her albuterol and Advair.  Increase fluid intake.  Patient still wishes to be tested for COVID-19.  We will send this out and results to be available in [facility] in the next 24 to 36 hours.  Home supportive care.  Follow-up in clinic for any worsening symptoms.  Call with questions or concerns.     Diagnosis and associated orders:     1. LRTI (lower respiratory tract infection)  COVID/SARS CoV-2 PCR    azithromycin (ZITHROMAX) 250 MG Tab    benzonatate (TESSALON) 100 MG Cap    albuterol 108 (90 Base) MCG/ACT Aero Soln inhalation aerosol    fluticasone-salmeterol (ADVAIR) 100-50 MCG/DOSE AEROSOL POWDER, BREATH ACTIVATED   2. Tobacco dependence  albuterol 108 (90 Base) MCG/ACT Aero Soln inhalation aerosol    fluticasone-salmeterol (ADVAIR) 100-50 MCG/DOSE AEROSOL POWDER, BREATH ACTIVATED            Differential diagnosis, natural history, supportive care, and indications for immediate follow-up discussed.    I personally reviewed prior external notes and test results pertinent to today's visit.     Advised the patient to follow-up with the primary care physician for recheck, reevaluation, and consideration of further management.    Please note that this dictation was created using voice recognition software. I have made reasonable attempt to correct obvious errors, but I expect that there are errors of grammar and  possibly content that I did not discover before finalizing the note.    This note was electronically signed by JITENDRA Ennis PA-C

## 2022-03-21 LAB
SARS-COV-2 RNA RESP QL NAA+PROBE: NOTDETECTED
SPECIMEN SOURCE: NORMAL

## 2022-03-22 DIAGNOSIS — J22 LRTI (LOWER RESPIRATORY TRACT INFECTION): ICD-10-CM

## 2022-03-22 RX ORDER — AZITHROMYCIN 250 MG/1
TABLET, FILM COATED ORAL
Qty: 6 TABLET | Refills: 0 | Status: SHIPPED | OUTPATIENT
Start: 2022-03-22

## 2022-03-23 DIAGNOSIS — F41.9 ANXIETY: ICD-10-CM

## 2022-03-23 RX ORDER — TRAZODONE HYDROCHLORIDE 150 MG/1
150 TABLET ORAL
Qty: 90 TABLET | Refills: 0 | OUTPATIENT
Start: 2022-03-23

## 2022-04-09 DIAGNOSIS — F41.9 ANXIETY: ICD-10-CM

## 2022-04-12 RX ORDER — HYDROXYZINE 50 MG/1
50 TABLET, FILM COATED ORAL 3 TIMES DAILY PRN
Qty: 90 TABLET | Refills: 0 | Status: SHIPPED | OUTPATIENT
Start: 2022-04-12

## 2022-04-21 NOTE — PROGRESS NOTES
As of 4/21/22, [facility] so I called the patient to remind her to send it back.  She is going to send it today.  Reading should be available in about a week.

## 2022-04-28 ENCOUNTER — TELEPHONE (OUTPATIENT)
Dept: CARDIOLOGY | Facility: MEDICAL CENTER | Age: 57
End: 2022-04-28
Payer: MEDICARE

## 2022-06-27 DIAGNOSIS — J22 LRTI (LOWER RESPIRATORY TRACT INFECTION): ICD-10-CM

## 2022-06-27 DIAGNOSIS — F17.200 TOBACCO DEPENDENCE: ICD-10-CM

## 2022-06-27 RX ORDER — ALBUTEROL SULFATE 90 UG/1
2 AEROSOL, METERED RESPIRATORY (INHALATION) EVERY 6 HOURS PRN
Qty: 8.5 G | Refills: 0 | Status: SHIPPED | OUTPATIENT
Start: 2022-06-27

## 2022-06-27 NOTE — TELEPHONE ENCOUNTER
Received request via: Pharmacy    Was the patient seen in the last year in this department? Yes, 11/2021    Does the patient have an active prescription (recently filled or refills available) for medication(s) requested? No

## 2022-09-17 ENCOUNTER — HOSPITAL ENCOUNTER (EMERGENCY)
Facility: MEDICAL CENTER | Age: 57
End: 2022-09-17
Attending: EMERGENCY MEDICINE
Payer: MEDICARE

## 2022-09-17 VITALS
WEIGHT: 145.28 LBS | DIASTOLIC BLOOD PRESSURE: 77 MMHG | RESPIRATION RATE: 16 BRPM | OXYGEN SATURATION: 97 % | SYSTOLIC BLOOD PRESSURE: 130 MMHG | TEMPERATURE: 98 F | BODY MASS INDEX: 23.35 KG/M2 | HEART RATE: 67 BPM | HEIGHT: 66 IN

## 2022-09-17 DIAGNOSIS — R11.15 CYCLICAL VOMITING: ICD-10-CM

## 2022-09-17 LAB
ALBUMIN SERPL BCP-MCNC: 5.2 G/DL (ref 3.2–4.9)
ALBUMIN/GLOB SERPL: 2.1 G/DL
ALP SERPL-CCNC: 61 U/L (ref 30–99)
ALT SERPL-CCNC: 14 U/L (ref 2–50)
ANION GAP SERPL CALC-SCNC: 14 MMOL/L (ref 7–16)
AST SERPL-CCNC: 18 U/L (ref 12–45)
BASOPHILS # BLD AUTO: 0.3 % (ref 0–1.8)
BASOPHILS # BLD: 0.03 K/UL (ref 0–0.12)
BILIRUB SERPL-MCNC: 0.5 MG/DL (ref 0.1–1.5)
BUN SERPL-MCNC: 14 MG/DL (ref 8–22)
CALCIUM SERPL-MCNC: 9.7 MG/DL (ref 8.4–10.2)
CHLORIDE SERPL-SCNC: 98 MMOL/L (ref 96–112)
CO2 SERPL-SCNC: 25 MMOL/L (ref 20–33)
CREAT SERPL-MCNC: 1 MG/DL (ref 0.5–1.4)
EOSINOPHIL # BLD AUTO: 0.01 K/UL (ref 0–0.51)
EOSINOPHIL NFR BLD: 0.1 % (ref 0–6.9)
ERYTHROCYTE [DISTWIDTH] IN BLOOD BY AUTOMATED COUNT: 45.4 FL (ref 35.9–50)
GFR SERPLBLD CREATININE-BSD FMLA CKD-EPI: 65 ML/MIN/1.73 M 2
GLOBULIN SER CALC-MCNC: 2.5 G/DL (ref 1.9–3.5)
GLUCOSE SERPL-MCNC: 114 MG/DL (ref 65–99)
HCT VFR BLD AUTO: 44.7 % (ref 37–47)
HGB BLD-MCNC: 15.4 G/DL (ref 12–16)
IMM GRANULOCYTES # BLD AUTO: 0.02 K/UL (ref 0–0.11)
IMM GRANULOCYTES NFR BLD AUTO: 0.2 % (ref 0–0.9)
LACTATE SERPL-SCNC: 1.6 MMOL/L (ref 0.5–2)
LIPASE SERPL-CCNC: 73 U/L (ref 7–58)
LYMPHOCYTES # BLD AUTO: 1.35 K/UL (ref 1–4.8)
LYMPHOCYTES NFR BLD: 13.1 % (ref 22–41)
MCH RBC QN AUTO: 33.8 PG (ref 27–33)
MCHC RBC AUTO-ENTMCNC: 34.5 G/DL (ref 33.6–35)
MCV RBC AUTO: 98 FL (ref 81.4–97.8)
MONOCYTES # BLD AUTO: 0.48 K/UL (ref 0–0.85)
MONOCYTES NFR BLD AUTO: 4.7 % (ref 0–13.4)
NEUTROPHILS # BLD AUTO: 8.42 K/UL (ref 2–7.15)
NEUTROPHILS NFR BLD: 81.6 % (ref 44–72)
NRBC # BLD AUTO: 0 K/UL
NRBC BLD-RTO: 0 /100 WBC
PLATELET # BLD AUTO: 250 K/UL (ref 164–446)
PMV BLD AUTO: 10.1 FL (ref 9–12.9)
POTASSIUM SERPL-SCNC: 3.2 MMOL/L (ref 3.6–5.5)
PROT SERPL-MCNC: 7.7 G/DL (ref 6–8.2)
RBC # BLD AUTO: 4.56 M/UL (ref 4.2–5.4)
SODIUM SERPL-SCNC: 137 MMOL/L (ref 135–145)
WBC # BLD AUTO: 10.3 K/UL (ref 4.8–10.8)

## 2022-09-17 PROCEDURE — 700105 HCHG RX REV CODE 258: Performed by: EMERGENCY MEDICINE

## 2022-09-17 PROCEDURE — 96374 THER/PROPH/DIAG INJ IV PUSH: CPT

## 2022-09-17 PROCEDURE — 36415 COLL VENOUS BLD VENIPUNCTURE: CPT

## 2022-09-17 PROCEDURE — A9270 NON-COVERED ITEM OR SERVICE: HCPCS | Performed by: EMERGENCY MEDICINE

## 2022-09-17 PROCEDURE — 99284 EMERGENCY DEPT VISIT MOD MDM: CPT

## 2022-09-17 PROCEDURE — 80053 COMPREHEN METABOLIC PANEL: CPT

## 2022-09-17 PROCEDURE — 83605 ASSAY OF LACTIC ACID: CPT

## 2022-09-17 PROCEDURE — 83690 ASSAY OF LIPASE: CPT

## 2022-09-17 PROCEDURE — 700102 HCHG RX REV CODE 250 W/ 637 OVERRIDE(OP): Performed by: EMERGENCY MEDICINE

## 2022-09-17 PROCEDURE — 700111 HCHG RX REV CODE 636 W/ 250 OVERRIDE (IP): Performed by: EMERGENCY MEDICINE

## 2022-09-17 PROCEDURE — 85025 COMPLETE CBC W/AUTO DIFF WBC: CPT

## 2022-09-17 PROCEDURE — 96375 TX/PRO/DX INJ NEW DRUG ADDON: CPT

## 2022-09-17 RX ORDER — DIPHENHYDRAMINE HYDROCHLORIDE 50 MG/ML
50 INJECTION INTRAMUSCULAR; INTRAVENOUS ONCE
Status: COMPLETED | OUTPATIENT
Start: 2022-09-17 | End: 2022-09-17

## 2022-09-17 RX ORDER — POTASSIUM CHLORIDE 20 MEQ/1
40 TABLET, EXTENDED RELEASE ORAL ONCE
Status: COMPLETED | OUTPATIENT
Start: 2022-09-17 | End: 2022-09-17

## 2022-09-17 RX ORDER — LORAZEPAM 2 MG/ML
1 INJECTION INTRAMUSCULAR ONCE
Status: COMPLETED | OUTPATIENT
Start: 2022-09-17 | End: 2022-09-17

## 2022-09-17 RX ORDER — SODIUM CHLORIDE 9 MG/ML
1000 INJECTION, SOLUTION INTRAVENOUS ONCE
Status: COMPLETED | OUTPATIENT
Start: 2022-09-17 | End: 2022-09-17

## 2022-09-17 RX ORDER — HALOPERIDOL 5 MG/ML
5 INJECTION INTRAMUSCULAR ONCE
Status: COMPLETED | OUTPATIENT
Start: 2022-09-17 | End: 2022-09-17

## 2022-09-17 RX ADMIN — POTASSIUM CHLORIDE 40 MEQ: 1500 TABLET, EXTENDED RELEASE ORAL at 11:09

## 2022-09-17 RX ADMIN — DIPHENHYDRAMINE HYDROCHLORIDE 50 MG: 50 INJECTION INTRAMUSCULAR; INTRAVENOUS at 09:26

## 2022-09-17 RX ADMIN — SODIUM CHLORIDE 1000 ML: 9 INJECTION, SOLUTION INTRAVENOUS at 09:25

## 2022-09-17 RX ADMIN — LORAZEPAM 1 MG: 2 INJECTION INTRAMUSCULAR; INTRAVENOUS at 09:36

## 2022-09-17 RX ADMIN — HALOPERIDOL LACTATE 5 MG: 5 INJECTION, SOLUTION INTRAMUSCULAR at 10:28

## 2022-09-17 NOTE — ED NOTES
"Report from SANDRA Sparks.  Met pt she is upset and anxious and wants to leave attempting to pull out IV line.  She states \" you are not doing anything I want to go home and nothing is working, I want out of here.\"  Dr. Wilde aware.  Pt given PO K+ per order which pt did take and did not have any emesis after.  D/C ppw signed, IV removed and pt left w/ her dtr.  VSS.  NAD.    "

## 2022-09-17 NOTE — ED TRIAGE NOTES
Patient complains of cyclic vomiting and diarrhea since last night. States she has experienced this before and has not been able to find any relief with her usual treatments.

## 2022-09-17 NOTE — ED NOTES
Meds given slow ivp-with slight decrease in restlessness noted. Family remains at bedside. Hot pack also provided

## 2022-09-17 NOTE — ED PROVIDER NOTES
"ED Provider Note    CHIEF COMPLAINT  Chief Complaint   Patient presents with    N/V     Pt reports Hx of cyclic vomiting, states started two days ago, unable to tolerate prescribed medications      Stress     Pt stats has been under a \"high level\" of stress over the last two days leading to N/V, denies SI/HI       HPI  Arabella Anglin is a 56 y.o. female who presents with nausea and vomiting.  The patient has a history of cyclical vomiting syndrome.  She states she started getting sick couple of days ago.  She states has not been able to tolerate any oral fluids since last night.  She does have cramping abdominal pain.  She states she is been under high-level stress.  She admits to marijuana use 2 days ago.  She is also had some diarrhea.  She has not had any associated fevers.  She is unaware of any sick contacts.    REVIEW OF SYSTEMS  See HPI for further details. All other systems are negative.     PAST MEDICAL HISTORY  Past Medical History:   Diagnosis Date    Decreased GFR 05/01/2019    Macrocytosis without anemia 02/19/2019    Vomiting 02/15/2019    Anxiety     Chronic back pain     Cyclic vomiting syndrome     PVC (premature ventricular contraction)        FAMILY HISTORY  [unfilled]    SOCIAL HISTORY  Social History     Socioeconomic History    Marital status: Single   Tobacco Use    Smoking status: Every Day     Types: Cigarettes    Smokeless tobacco: Never   Vaping Use    Vaping Use: Never used   Substance and Sexual Activity    Alcohol use: No     Comment: denies    Drug use: Yes     Types: Inhaled, Marijuana       SURGICAL HISTORY  Past Surgical History:   Procedure Laterality Date    KNEE ARTHROSCOPY      OPEN REDUCTION      ND BREAST AUGMENTATION WITH IMPLANT      PRIMARY C SECTION      TONSILLECTOMY         CURRENT MEDICATIONS  Home Medications    **Home medications have not yet been reviewed for this encounter**         ALLERGIES  No Known Allergies    PHYSICAL EXAM  VITAL SIGNS: BP (!) 173/83   " "Pulse (!) 49   Temp 36.6 °C (97.8 °F) (Temporal)   Resp (!) 22   Ht 1.676 m (5' 6\")   Wt 65.9 kg (145 lb 4.5 oz)   SpO2 99%   BMI 23.45 kg/m²       Constitutional: Ill in appearance with active emesis  HENT: Normocephalic, Atraumatic, Bilateral external ears normal, Oropharynx moist, No oral exudates, Nose normal.   Eyes: PERRLA, EOMI, Conjunctiva normal, No discharge.   Neck: Normal range of motion, No tenderness, Supple, No stridor.   Lymphatic: No lymphadenopathy noted.   Cardiovascular: Normal heart rate, Normal rhythm, No murmurs, No rubs, No gallops.   Thorax & Lungs: Normal breath sounds, No respiratory distress, No wheezing, No chest tenderness.   Abdomen: Bowel sounds normal, Soft, No tenderness, No masses, No pulsatile masses.   Skin: Warm, Dry, No erythema, No rash.   Back: No tenderness, No CVA tenderness.    Extremities: Intact distal pulses, No edema, No tenderness, No cyanosis, No clubbing.   Neurologic: Alert & oriented x 3, Normal motor function, Normal sensory function, No focal deficits noted.   Psychiatric: Anxious.     Results for orders placed or performed during the hospital encounter of 09/17/22   LACTIC ACID   Result Value Ref Range    Lactic Acid 1.6 0.5 - 2.0 mmol/L   CMP   Result Value Ref Range    Sodium 137 135 - 145 mmol/L    Potassium 3.2 (L) 3.6 - 5.5 mmol/L    Chloride 98 96 - 112 mmol/L    Co2 25 20 - 33 mmol/L    Anion Gap 14.0 7.0 - 16.0    Glucose 114 (H) 65 - 99 mg/dL    Bun 14 8 - 22 mg/dL    Creatinine 1.00 0.50 - 1.40 mg/dL    Calcium 9.7 8.4 - 10.2 mg/dL    AST(SGOT) 18 12 - 45 U/L    ALT(SGPT) 14 2 - 50 U/L    Alkaline Phosphatase 61 30 - 99 U/L    Total Bilirubin 0.5 0.1 - 1.5 mg/dL    Albumin 5.2 (H) 3.2 - 4.9 g/dL    Total Protein 7.7 6.0 - 8.2 g/dL    Globulin 2.5 1.9 - 3.5 g/dL    A-G Ratio 2.1 g/dL   CBC WITH DIFFERENTIAL   Result Value Ref Range    WBC 10.3 4.8 - 10.8 K/uL    RBC 4.56 4.20 - 5.40 M/uL    Hemoglobin 15.4 12.0 - 16.0 g/dL    Hematocrit 44.7 37.0 " - 47.0 %    MCV 98.0 (H) 81.4 - 97.8 fL    MCH 33.8 (H) 27.0 - 33.0 pg    MCHC 34.5 33.6 - 35.0 g/dL    RDW 45.4 35.9 - 50.0 fL    Platelet Count 250 164 - 446 K/uL    MPV 10.1 9.0 - 12.9 fL    Neutrophils-Polys 81.60 (H) 44.00 - 72.00 %    Lymphocytes 13.10 (L) 22.00 - 41.00 %    Monocytes 4.70 0.00 - 13.40 %    Eosinophils 0.10 0.00 - 6.90 %    Basophils 0.30 0.00 - 1.80 %    Immature Granulocytes 0.20 0.00 - 0.90 %    Nucleated RBC 0.00 /100 WBC    Neutrophils (Absolute) 8.42 (H) 2.00 - 7.15 K/uL    Lymphs (Absolute) 1.35 1.00 - 4.80 K/uL    Monos (Absolute) 0.48 0.00 - 0.85 K/uL    Eos (Absolute) 0.01 0.00 - 0.51 K/uL    Baso (Absolute) 0.03 0.00 - 0.12 K/uL    Immature Granulocytes (abs) 0.02 0.00 - 0.11 K/uL    NRBC (Absolute) 0.00 K/uL   LIPASE   Result Value Ref Range    Lipase 73 (H) 7 - 58 U/L   ESTIMATED GFR   Result Value Ref Range    GFR (CKD-EPI) 65 >60 mL/min/1.73 m 2       COURSE & MEDICAL DECISION MAKING  Pertinent Labs & Imaging studies reviewed. (See chart for details)  This a 56-year-old female who presents the emergency department with cyclical vomiting syndrome.  The patient initially received Benadryl and Ativan intravenously.  This was only temporarily effective.  I then administered Haldol intramuscularly.  Subsequently she has stopped vomiting and at this time would now like to leave.  Her potassium is slightly low and I like her to try oral challenge prior to discharge.  She has agreed to stay for the potassium supplementation and an oral challenge.  As for the elevation in the lipase she does not have any epigastric pain to deep palpation to support pancreatitis.  My suspicion is this is from cyclical vomiting syndrome.  Her blood pressure is elevated and she is aware this needs to be followed up in 7 to 10 days.  She will refrain from marijuana use and return if she is acutely worse.    FINAL IMPRESSION  1.  Cyclical vomiting syndrome  2.  Hypokalemia    Disposition  The patient will be  discharged in stable condition         Electronically signed by: Ric Wilde M.D., 9/17/2022 9:11 AM

## 2022-09-17 NOTE — ED NOTES
"Dtr to nurses station, states pt denies chg in previous s/s and \"is cold \"and \"wants to leave\". Warm blankets provided-will update erp  "

## 2022-09-17 NOTE — DISCHARGE INSTRUCTIONS
Refrain from marijuana use.  Follow-up with your primary care doctor in 7 to 10 days for repeat blood pressure check.  Return if you are acutely worse.

## 2022-09-17 NOTE — ED NOTES
"Intermitt \"dry heaving\" noted from pt room. Await erp. Saline lock with blood draw by er tech.   "

## 2022-09-17 NOTE — ED NOTES
Erp to bedside, pt moaning and whimpering. Orders recvd. Placed on cardiac moniter, verbal reassurance and encouragement provided to promote relaxation and decrease hyperventilation. Ivf and benadryl per order   PAST SURGICAL HISTORY:  No significant past surgical history

## 2022-09-17 NOTE — ED TRIAGE NOTES
"Chief Complaint   Patient presents with    N/V     Pt reports Hx of cyclic vomiting, states started two days ago, unable to tolerate prescribed medications      Stress     Pt stats has been under a \"high level\" of stress over the last two days leading to N/V, denies SI/HI     BP (!) 161/96   Pulse (!) 52   Temp 36.6 °C (97.8 °F) (Temporal)   Resp (!) 22   Ht 1.676 m (5' 6\")   Wt 65.9 kg (145 lb 4.5 oz)   SpO2 98%   BMI 23.45 kg/m²     Pt to ED via WC w/ visitor for c/o N/V x 2 days, unable to tolerate any food or water or prescribed medications.  Pt states is r/t recent stressors, denies SI at this time, states last used Marijuana two days ago.    "

## 2022-09-17 NOTE — ED NOTES
"Called to room by pt calling out \"help\". Vs as charted, states \"I feel sleepy but not any better\". C/o upper abd pain, and states she is still vomiting.  Continues to jorge and guy.Update to erp will continue to moniter  "

## 2022-11-03 NOTE — ED PROVIDER NOTES
ER Provider Note     Scribed for Claudio Strong M.D. by Leandra Virgen. 2/14/2019, 1:38 AM.    Primary Care Provider: Pcp Pt States None  Means of Arrival: Walk in    History obtained from: Patient  History limited by: None     CHIEF COMPLAINT  Chief Complaint   Patient presents with   • N/V     Began last night, worsened 3-4 hrs ago       HPI  Arabella Anglin is a 53 y.o. female with a history of anxiety who presents to the Emergency Department complaining of worsening epigastric pain tonight. She reports associated nausea and vomiting. Patient states she has ongoing issues with hyperemesis syndrome secondary to marijuana use. She was seen in this ED 3 days ago for the same complaints. Denies dysuria.     REVIEW OF SYSTEMS  See HPI for further details. All other systems are negative.     PAST MEDICAL HISTORY   has a past medical history of Anxiety; Chronic back pain; and PVC (premature ventricular contraction).    SURGICAL HISTORY  patient denies any surgical history    SOCIAL HISTORY  Social History   Substance Use Topics   • Smoking status: Current Every Day Smoker     Packs/day: 0.50   • Smokeless tobacco: Never Used   • Alcohol use No      History   Drug Use   • Types: Inhaled     Comment: Usually every day, has not smoked since Sunday       FAMILY HISTORY  History reviewed. No pertinent family history.    CURRENT MEDICATIONS  No current facility-administered medications on file prior to encounter.      Current Outpatient Prescriptions on File Prior to Encounter   Medication Sig Dispense Refill   • nicotine (NICODERM) 21 MG/24HR PATCH 24 HR Apply 1 Patch to skin as directed every 24 hours. 30 Patch 0   • ondansetron (ZOFRAN ODT) 4 MG TABLET DISPERSIBLE Take 1 Tab by mouth every 8 hours as needed. 10 Tab 0   • metoprolol SR (TOPROL XL) 100 MG TABLET SR 24 HR Take 150 mg by mouth every day.     • traZODone (DESYREL) 150 MG Tab Take 150 mg by mouth every evening.     • DULoxetine (CYMBALTA) 60 MG Cap   Procedure:  COLONOSCOPY    Relevant Problems   CARDIO   (+) Primary hypertension        Physical Exam    Airway    Mallampati score: II  TM Distance: >3 FB  Neck ROM: full     Dental   Comment: Denies loose teeth,     Cardiovascular  Cardiovascular exam normal    Pulmonary  Pulmonary exam normal     Other Findings  Portions of exam deferred due to low yield and/or unknown COVID status      Anesthesia Plan  ASA Score- 2     Anesthesia Type- IV sedation with anesthesia with ASA Monitors  Additional Monitors:   Airway Plan:           Plan Factors-Exercise tolerance (METS): >4 METS  Chart reviewed  Existing labs reviewed  Patient summary reviewed  Patient is not a current smoker  Induction- intravenous  Postoperative Plan-     Informed Consent- Anesthetic plan and risks discussed with patient  I personally reviewed this patient with the CRNA  Discussed and agreed on the Anesthesia Plan with the CRNA  Eulalio Ratliff Particles delayed-release capsule Take 60 mg by mouth every day.     • valacyclovir (VALTREX) 1 GM Tab Take 1,000 mg by mouth Once PRN (Breakouts).         ALLERGIES  No Known Allergies    PHYSICAL EXAM  VITAL SIGNS: BP (!) 171/98   Pulse 63   Temp 36.7 °C (98.1 °F) (Temporal)   Resp 18   Wt 65.6 kg (144 lb 10 oz)   SpO2 98%   BMI 22.65 kg/m²       Constitutional: Alert in moderate distress, however consolable.   HENT: No signs of trauma, Bilateral external ears normal, Nose normal. Does not appear dehydrated.   Eyes: Pupils are equal and reactive, Conjunctiva normal, Non-icteric.   Neck: Normal range of motion, No tenderness, Supple, No stridor.   Cardiovascular: Regular rate and rhythm, no murmurs.   Thorax & Lungs: Normal breath sounds, No respiratory distress, No wheezing, No chest tenderness.   Abdomen: Bowel sounds normal, Soft, Epigastric tenderness to palpation, No masses, No pulsatile masses. No peritoneal signs.  Skin: Warm, Dry, No erythema, No rash.   Extremities: Intact distal pulses, No edema, No tenderness, No cyanosis.  Musculoskeletal: Good range of motion in all major joints. No tenderness to palpation or major deformities noted.   Neurologic: Alert , Normal motor function, Normal sensory function, No focal deficits noted.   Psychiatric: Affect normal, Judgment normal, Mood normal.     DIAGNOSTIC STUDIES / PROCEDURES    LABS  Labs Reviewed   CBC WITH DIFFERENTIAL - Abnormal; Notable for the following:        Result Value    MCV 98.4 (*)     MCH 33.9 (*)     Neutrophils-Polys 79.90 (*)     Lymphocytes 14.90 (*)     Neutrophils (Absolute) 7.68 (*)     All other components within normal limits   COMP METABOLIC PANEL - Abnormal; Notable for the following:     Sodium 134 (*)     Potassium 3.4 (*)     Glucose 148 (*)     All other components within normal limits   ESTIMATED GFR - Abnormal; Notable for the following:     GFR If Non  51 (*)     All other components within normal  limits   LIPASE     EKG  SINUS BRADYCARDIA  QT appears same as prior EKG.  No ST elevation or depression.  No twave inversion.    All labs reviewed by me.    COURSE & MEDICAL DECISION MAKING  Pertinent Labs & Imaging studies reviewed. (See chart for details)    This is a 53 y.o. female that presents with chronic abdominal pain.  The patient has a history of cannabis hyperemesis and she says this feels just like many of her previous attacks.  She says she was unable to fill her antibiotics therefore she is come to the emergency department.  I do not believe this is an acute abdominal process of I will check for pancreatitis as well as electrolyte derangement and treat the patient's symptoms.    1:38 AM Patient seen and examined at bedside. Ordered CBC, CMP, and Lipase.  Patient will be medicated with 25 mg benadryl, 10 mg Reglan, and 5 mg Haldol for her symptoms.     The patient will return for new or worsening symptoms and is stable at the time of discharge.    Patient's blood pressure was elevated, I believe it is likely secondary to medical condition. However I have advised home monitoring and if it continues to be 120/80 or higher, advised to followup with primary care physician for blood pressure management.     The patient has no significant leukocytosis or anemia.  The patient has no significant electrolyte derangements.  She has a mildly decreased GFR.  After treating the patient's symptoms she is feeling much improved.  She has what is being read as an elevated QT however I believe this is an air in the QT appears the same as her prior EKG.  At this time I will discharge the patient home with a prescription for Phenergan and strict return precautions    DISPOSITION:  Patient will be discharged home in stable condition.    FOLLOW UP:  [facility]  [address]  [place] [address]  [phone]            OUTPATIENT MEDICATIONS:  Discharge Medication List as of 2/14/2019  2:32 AM        FINAL  IMPRESSION  1. Cyclical vomiting with nausea, intractability of vomiting not specified    2. Generalized abdominal pain          Leandra MENCHACA (Scribtreasure), am scribing for, and in the presence of, Claudio Strong M.D..  Electronically signed by: Leandra Virgen (Dea), 2/14/2019  Claudio MENCHACA M.D. personally performed the services described in this documentation, as scribed by Leandra Virgen in my presence, and it is both accurate and complete. C.      The note accurately reflects work and decisions made by me.  Claudio Strong  2/14/2019  2:59 AM

## 2022-11-11 NOTE — DISCHARGE SUMMARY
Hospital Medicine Discharge Note     Patient ID:  Arabella Anglin  [id number]  53 y.o.female  1965    Admit Date:  2/3/2019       Discharge Date:   2/4/2019    Primary Care Provider: Pcp Pt States None    Admitting Physician: Chapincito Grimes M.D.  Discharging Physician: Carl Patricia M.D.    Chief Complaint: Abdominal pain, N/V     Discharge Diagnoses:   Principal Problem:    Generalized abdominal pain-resolved likely related to cannabinoid hyperemesis syndrome, give extensive education to patient, patient states she will try and refrain from using cannabinoids.   Active Problems:    Cannabinoid hyperemesis syndrome (HCC)-patient able to tolerate p.o. intake at this time.  No further episodes of nausea and vomiting.  Patient discharged with antiemetics and encouraged to cease using cannabinoids.     Tobacco use-encourage cessation, patient discharged with prescription for nicotine patch.    Anxiety-encourage patient to look into other ways to control her anxiety such as yoga, exercise, meditation, or counseling and follow-up with PCP for possible need for medication    Weight loss-unintentional, will likely resolve if patient seizes to use cannabinoids with no further nausea vomiting episodes    Fibromyalgia-patient to follow-up with PCP    PVC's (premature ventricular contractions)-controlled patient to follow-up with PCP as needed      Chronic Medical Problems:  Past Medical History:   Diagnosis Date   • Anxiety    • Chronic back pain    • PVC (premature ventricular contraction)        Code Status: Full Code    Hospital Summary:       Please refer to H&P by Chapincito Grimes M.D. on 2/3/2019 for full details.      In brief, Arabella Anglin is a 53 y.o. female who was admitted 2/3/2019 for abdominal pain.  Patient has known history of anxiety, chronic back pain, fibromyalgia, daily marijuana use and previous PVCs.  Patient was just previously admitted to the hospital with abdominal pain and nausea/  vomiting on 2/1/19.  Patient underwent CT scan of her abdomen which was negative at that time and was provided IV fluids and antiemetics.  Patient started feeling better and left hospital AGAINST MEDICAL ADVICE.  Patient went home and continued to utilize marijuana, with recurrence of her abdominal pain and cyclic nausea and vomiting.  She then re-presented to the emergency room with recurrence of symptoms.  During her last admission there was a long discussion about cessation of marijuana use which was again discussed in the emergency room prior to admission.   Patient was admitted to CDU for further monitoring.  Patient was provided IV fluid hydration, antiemetics and analgesics.  Patient was provided nicotine patch for her tobacco abuse, and provided smoking cessation education.  Patient's diet was advanced slowly.  Patient monitored overnight with IV fluid hydration and antiemetics, by morning patient was tolerating p.o. and had no further episodes of abdominal pain/nausea or vomiting.  Urinalysis was checked and found negative for infection.  Discussed with patient alternative options for her anxiety, to include meditation, yoga, exercise, therapy and follow-up with PCP for possible medications.  Patient states she has been utilizing marijuana daily since she was 12 years old.  Have given patient handouts regarding cyclic vomiting syndrome/cannabinoid hyperemesis syndrome.  Patient states she will refrain from using cannabinoids and will follow up with her PCP appointment that has been arranged for her.  On discharge patient's vital signs were stable.  Patient was ambulating independently.  Patient has no further complaints of chest pain, shortness of breath, abdominal pain, nausea or vomiting.  Patient is tolerating p.o. intake and is adequately able to hydrate herself.  Patient was discharged with antiemetics to help with nausea if it reoccurs.    Therefore, she is discharged in good and stable condition with  close outpatient follow-up.    Consultants:      None     Studies:    Imaging/ Testing:      CT-HEAD W/O   Final Result      No evidence of acute intracranial process.            Laboratory:   Recent Labs      02/01/19   1118  02/03/19   0730   WBC  8.3  10.8   RBC  4.40  4.23   HEMOGLOBIN  14.8  14.4   HEMATOCRIT  43.1  41.4   MCV  98.0*  97.9*   MCH  33.6*  34.0*   MCHC  34.3  34.8   RDW  44.1  43.5   PLATELETCT  150*  149*   MPV  10.9  10.9       Recent Labs      02/01/19   1118  02/03/19   0730   SODIUM  139  138   POTASSIUM  3.4*  3.4*   CHLORIDE  106  103   CO2  22  22   GLUCOSE  132*  119*   BUN  19  20   CREATININE  0.89  0.82   CALCIUM  9.4  9.4       Recent Labs      02/01/19   1118  02/03/19   0730   ALTSGPT  12  14   ASTSGOT  18  22   ALKPHOSPHAT  41  38   TBILIRUBIN  0.6  0.4   LIPASE  75  27   GLUCOSE  132*  119*            Results     Procedure Component Value Units Date/Time    URINALYSIS CULTURE, IF INDICATED [[id number]]  (Abnormal) Collected:  02/03/19 1005    Order Status:  Completed Specimen:  Urine Updated:  02/03/19 1015     Color Yellow     Character Clear     Specific Gravity 1.009     Ph 6.0     Glucose Negative mg/dL      Ketones Negative mg/dL      Protein Negative mg/dL      Bilirubin Negative     Urobilinogen, Urine 0.2     Nitrite Negative     Leukocyte Esterase Negative     Occult Blood Trace (A)     Micro Urine Req Microscopic          Procedures/Surgeries:        None     Disposition:    Discharge home    Condition:  Stable    Instructions:   Activity: As tolerated.  Diet: As tolerated  Followup: With PCP or mental health facility- for help with anxiety and depression   Instructions:  -Please stop using marijuana  -Take nausea medications as needed   -Given instructions to return to the ER if any new or worsening symptoms, worsening condition, or failure to improve  -Call PCP for followup  -No smoking, no alcohol, no caffeine  -Encourage risk factor reduction with tobacco and alcohol  abstinence, diet changes, weight loss, and exercise.     Follow-Up  Future Appointments  Date Time Provider Department Center   2/15/2019 11:10 AM Laura Abrams D.O. [facility] [place]       Discharge Medications:             Medication List      START taking these medications      Instructions   nicotine 21 MG/24HR Pt24  Commonly known as:  NICODERM   Apply 1 Patch to skin as directed every 24 hours.  Dose:  1 Patch     ondansetron 4 MG Tbdp  Commonly known as:  ZOFRAN ODT   Take 1 Tab by mouth every 8 hours as needed.  Dose:  4 mg        CONTINUE taking these medications      Instructions   DULoxetine 60 MG Cpep delayed-release capsule  Commonly known as:  CYMBALTA   Take 60 mg by mouth every day.  Dose:  60 mg     metoprolol  MG Tb24  Commonly known as:  TOPROL XL   Take 150 mg by mouth every day.  Dose:  150 mg     traZODone 150 MG Tabs  Commonly known as:  DESYREL   Take 150 mg by mouth every evening.  Dose:  150 mg     valacyclovir 1 GM Tabs  Commonly known as:  VALTREX   Take 1,000 mg by mouth Once PRN (Breakouts).  Dose:  1000 mg                Please CC the above physicians    BRY Stevens  2/4/2019  8:45 AM             no

## 2023-08-25 NOTE — PROGRESS NOTES
Telephone Appointment Visit   As a means of avoiding spread of COVID-19, this visit is being conducted by telephone. This telephone visit was initiated by the patient and they verbally consented.    Time at start of call: 4:08 PM     Reason for Call:  Symptom Follow-up    Patient Comments / History:     Patient was seen in the office 2 days ago for left lower quadrant abdominal pain, severe abdominal cramping, multiple episodes of diarrhea, and bloody stools. She was prescribed Flagyl and Cipro for presumed diverticulitis. She has not had any side effects. Her pain has greatly improved. She has not seen any blood in her stools. She still has loose stools, but her diarrhea is more formed. Her appetite has improved.    Labs / Images Reviewed   Not applicable.     Assessment and Plan:     1. LLQ pain  2. Diarrhea, unspecified type  3. Hematochezia  Patient's symptoms have greatly improved since starting on the antibiotics. Her stool is more formed, and her pain has nearly resolved. She has no longer been having any bloody stools. I advised that she can proceed to a normal diet to help with her symptoms. Patient understands to finish the entire course of antibiotics. Discussed ED precautions such as bloody stools, worsening pain, fevers or chills. She understands that there is still a need to follow-up with GI as well for a colonoscopy due to her bloody stools.      Follow-up: Return in about 3 months (around 6/27/2020).    Time at end of call: 4:18 PM   Total Time Spent: 5-10 minutes    Laura Abrams D.O.     Brandan MENCHACA (Dea), am scribing for, and in the presence of, Laura Abrams DO.    Electronically signed by: Brandan Pinedo (Dea), 3/27/2020     Laura MENCHACA DO personally performed the services described in this documentation, as scribed by Brandan Pinedo in my presence, and it is both accurate and complete.    
HOB 20*/bed rails

## 2023-10-20 ENCOUNTER — APPOINTMENT (OUTPATIENT)
Dept: RADIOLOGY | Facility: MEDICAL CENTER | Age: 58
End: 2023-10-20
Attending: STUDENT IN AN ORGANIZED HEALTH CARE EDUCATION/TRAINING PROGRAM
Payer: MEDICARE

## 2023-10-20 ENCOUNTER — HOSPITAL ENCOUNTER (EMERGENCY)
Facility: MEDICAL CENTER | Age: 58
End: 2023-10-20
Attending: STUDENT IN AN ORGANIZED HEALTH CARE EDUCATION/TRAINING PROGRAM
Payer: MEDICARE

## 2023-10-20 VITALS
SYSTOLIC BLOOD PRESSURE: 144 MMHG | HEART RATE: 68 BPM | WEIGHT: 140 LBS | HEIGHT: 66 IN | RESPIRATION RATE: 20 BRPM | DIASTOLIC BLOOD PRESSURE: 76 MMHG | OXYGEN SATURATION: 97 % | TEMPERATURE: 99.8 F | BODY MASS INDEX: 22.5 KG/M2

## 2023-10-20 DIAGNOSIS — R11.2 NAUSEA AND VOMITING, UNSPECIFIED VOMITING TYPE: ICD-10-CM

## 2023-10-20 DIAGNOSIS — R19.7 DIARRHEA, UNSPECIFIED TYPE: ICD-10-CM

## 2023-10-20 LAB
ALBUMIN SERPL BCP-MCNC: 4.5 G/DL (ref 3.2–4.9)
ALBUMIN/GLOB SERPL: 1.5 G/DL
ALP SERPL-CCNC: 64 U/L (ref 30–99)
ALT SERPL-CCNC: 10 U/L (ref 2–50)
ANION GAP SERPL CALC-SCNC: 12 MMOL/L (ref 7–16)
APPEARANCE UR: CLEAR
AST SERPL-CCNC: 22 U/L (ref 12–45)
BACTERIA #/AREA URNS HPF: NEGATIVE /HPF
BASOPHILS # BLD AUTO: 0.2 % (ref 0–1.8)
BASOPHILS # BLD: 0.02 K/UL (ref 0–0.12)
BILIRUB SERPL-MCNC: 0.4 MG/DL (ref 0.1–1.5)
BILIRUB UR QL STRIP.AUTO: NEGATIVE
BUN SERPL-MCNC: 19 MG/DL (ref 8–22)
CALCIUM ALBUM COR SERPL-MCNC: 9 MG/DL (ref 8.5–10.5)
CALCIUM SERPL-MCNC: 9.4 MG/DL (ref 8.5–10.5)
CHLORIDE SERPL-SCNC: 101 MMOL/L (ref 96–112)
CO2 SERPL-SCNC: 24 MMOL/L (ref 20–33)
COLOR UR: YELLOW
CREAT SERPL-MCNC: 0.71 MG/DL (ref 0.5–1.4)
EKG IMPRESSION: NORMAL
EOSINOPHIL # BLD AUTO: 0 K/UL (ref 0–0.51)
EOSINOPHIL NFR BLD: 0 % (ref 0–6.9)
EPI CELLS #/AREA URNS HPF: NEGATIVE /HPF
ERYTHROCYTE [DISTWIDTH] IN BLOOD BY AUTOMATED COUNT: 47.3 FL (ref 35.9–50)
GFR SERPLBLD CREATININE-BSD FMLA CKD-EPI: 98 ML/MIN/1.73 M 2
GLOBULIN SER CALC-MCNC: 3 G/DL (ref 1.9–3.5)
GLUCOSE SERPL-MCNC: 116 MG/DL (ref 65–99)
GLUCOSE UR STRIP.AUTO-MCNC: 100 MG/DL
HCT VFR BLD AUTO: 40.7 % (ref 37–47)
HGB BLD-MCNC: 14.2 G/DL (ref 12–16)
HYALINE CASTS #/AREA URNS LPF: ABNORMAL /LPF
IMM GRANULOCYTES # BLD AUTO: 0.03 K/UL (ref 0–0.11)
IMM GRANULOCYTES NFR BLD AUTO: 0.4 % (ref 0–0.9)
KETONES UR STRIP.AUTO-MCNC: NEGATIVE MG/DL
LEUKOCYTE ESTERASE UR QL STRIP.AUTO: NEGATIVE
LIPASE SERPL-CCNC: 17 U/L (ref 11–82)
LYMPHOCYTES # BLD AUTO: 0.74 K/UL (ref 1–4.8)
LYMPHOCYTES NFR BLD: 8.9 % (ref 22–41)
MCH RBC QN AUTO: 33.3 PG (ref 27–33)
MCHC RBC AUTO-ENTMCNC: 34.9 G/DL (ref 32.2–35.5)
MCV RBC AUTO: 95.3 FL (ref 81.4–97.8)
MICRO URNS: ABNORMAL
MONOCYTES # BLD AUTO: 0.24 K/UL (ref 0–0.85)
MONOCYTES NFR BLD AUTO: 2.9 % (ref 0–13.4)
NEUTROPHILS # BLD AUTO: 7.31 K/UL (ref 1.82–7.42)
NEUTROPHILS NFR BLD: 87.6 % (ref 44–72)
NITRITE UR QL STRIP.AUTO: NEGATIVE
NRBC # BLD AUTO: 0 K/UL
NRBC BLD-RTO: 0 /100 WBC (ref 0–0.2)
PH UR STRIP.AUTO: 8.5 [PH] (ref 5–8)
PLATELET # BLD AUTO: 212 K/UL (ref 164–446)
PMV BLD AUTO: 10.3 FL (ref 9–12.9)
POTASSIUM SERPL-SCNC: 3.9 MMOL/L (ref 3.6–5.5)
PROT SERPL-MCNC: 7.5 G/DL (ref 6–8.2)
PROT UR QL STRIP: NEGATIVE MG/DL
RBC # BLD AUTO: 4.27 M/UL (ref 4.2–5.4)
RBC # URNS HPF: ABNORMAL /HPF
RBC UR QL AUTO: ABNORMAL
SODIUM SERPL-SCNC: 137 MMOL/L (ref 135–145)
SP GR UR STRIP.AUTO: 1.02
UROBILINOGEN UR STRIP.AUTO-MCNC: 0.2 MG/DL
WBC # BLD AUTO: 8.3 K/UL (ref 4.8–10.8)
WBC #/AREA URNS HPF: ABNORMAL /HPF

## 2023-10-20 PROCEDURE — 99285 EMERGENCY DEPT VISIT HI MDM: CPT

## 2023-10-20 PROCEDURE — 93005 ELECTROCARDIOGRAM TRACING: CPT | Performed by: STUDENT IN AN ORGANIZED HEALTH CARE EDUCATION/TRAINING PROGRAM

## 2023-10-20 PROCEDURE — 80053 COMPREHEN METABOLIC PANEL: CPT

## 2023-10-20 PROCEDURE — 700105 HCHG RX REV CODE 258: Mod: UD | Performed by: STUDENT IN AN ORGANIZED HEALTH CARE EDUCATION/TRAINING PROGRAM

## 2023-10-20 PROCEDURE — 36415 COLL VENOUS BLD VENIPUNCTURE: CPT

## 2023-10-20 PROCEDURE — 85025 COMPLETE CBC W/AUTO DIFF WBC: CPT

## 2023-10-20 PROCEDURE — 700111 HCHG RX REV CODE 636 W/ 250 OVERRIDE (IP): Mod: UD | Performed by: STUDENT IN AN ORGANIZED HEALTH CARE EDUCATION/TRAINING PROGRAM

## 2023-10-20 PROCEDURE — 81001 URINALYSIS AUTO W/SCOPE: CPT

## 2023-10-20 PROCEDURE — 71045 X-RAY EXAM CHEST 1 VIEW: CPT

## 2023-10-20 PROCEDURE — 96374 THER/PROPH/DIAG INJ IV PUSH: CPT

## 2023-10-20 PROCEDURE — 83690 ASSAY OF LIPASE: CPT

## 2023-10-20 RX ORDER — SODIUM CHLORIDE, SODIUM LACTATE, POTASSIUM CHLORIDE, CALCIUM CHLORIDE 600; 310; 30; 20 MG/100ML; MG/100ML; MG/100ML; MG/100ML
1000 INJECTION, SOLUTION INTRAVENOUS ONCE
Status: COMPLETED | OUTPATIENT
Start: 2023-10-20 | End: 2023-10-20

## 2023-10-20 RX ORDER — LORAZEPAM 2 MG/ML
0.5 INJECTION INTRAMUSCULAR ONCE
Status: COMPLETED | OUTPATIENT
Start: 2023-10-20 | End: 2023-10-20

## 2023-10-20 RX ADMIN — LORAZEPAM 0.5 MG: 2 INJECTION INTRAMUSCULAR; INTRAVENOUS at 16:18

## 2023-10-20 RX ADMIN — SODIUM CHLORIDE, POTASSIUM CHLORIDE, SODIUM LACTATE AND CALCIUM CHLORIDE 1000 ML: 600; 310; 30; 20 INJECTION, SOLUTION INTRAVENOUS at 15:42

## 2023-10-20 ASSESSMENT — FIBROSIS 4 INDEX: FIB4 SCORE: 1.12

## 2023-10-20 NOTE — ED TRIAGE NOTES
Arabella Anglin  57 y.o. female    Chief Complaint   Patient presents with    Abdominal Pain    N/V       Pt arrives via EMS with complaints of generalized abdominal pain, N/V/D that began this AM. Pt states she has been able to keep down some fluids. Pt states she has a hx of cyclic vomiting syndrome. Per medics, pt smokes marijuana.       Given 4mg ondansetron IV by medics

## 2023-10-20 NOTE — ED NOTES
Pt desat to 58% on RA while resting in bed. RR-10-12. Pt states she took methadone but threw it up this morning. Easily arousable. When talking, O2 sat increases back to 92%. Placed on 2 L/min O2 via NC. MD notified

## 2023-10-20 NOTE — ED PROVIDER NOTES
ED Provider Note    CHIEF COMPLAINT  Chief Complaint   Patient presents with    Abdominal Pain    N/V       EXTERNAL RECORDS REVIEWED  Outpatient Notes patient was seen in the emergency department on 9/17/2022 for nausea, vomiting and stress.  Noted history of cyclic vomiting syndrome at that time.  Symptoms abated with Haldol treatment.    HPI/ROS  LIMITATION TO HISTORY   Select: : None      Arabella Anglin is a 57 y.o. female who presents to the emergency department for evaluation of nausea, vomiting and diarrhea.  Patient reports generalized abdominal cramping as well.  Symptoms for the last day.  She reports that she continues to smoke marijuana but has not had an episode of cyclic vomiting in the last year.  She denies any fevers, chills, chest pain or pressure, trouble breathing, urinary symptoms    PAST MEDICAL HISTORY   has a past medical history of Anxiety, Chronic back pain, Cyclic vomiting syndrome, Decreased GFR (05/01/2019), Macrocytosis without anemia (02/19/2019), PVC (premature ventricular contraction), and Vomiting (02/15/2019).    SURGICAL HISTORY   has a past surgical history that includes open reduction; primary c section; tonsillectomy; knee arthroscopy; and breast augmentation with implant.    FAMILY HISTORY  Family History   Problem Relation Age of Onset    Heart Disease Mother     Diabetes Mother     Psychiatric Illness Father        SOCIAL HISTORY  Social History     Tobacco Use    Smoking status: Every Day     Types: Cigarettes    Smokeless tobacco: Never   Vaping Use    Vaping Use: Never used   Substance and Sexual Activity    Alcohol use: No     Comment: denies    Drug use: Yes     Types: Inhaled, Marijuana    Sexual activity: Not on file       CURRENT MEDICATIONS  Home Medications       Reviewed by Victorina Sorto R.N. (Registered Nurse) on 10/20/23 at 1519  Med List Status: Not Addressed     Medication Last Dose Status   albuterol 108 (90 Base) MCG/ACT Aero Soln inhalation aerosol   "Active   azithromycin (ZITHROMAX) 250 MG Tab  Active   benzonatate (TESSALON) 100 MG Cap  Active   busPIRone (BUSPAR) 10 MG Tab tablet  Active   famotidine (PEPCID) 20 MG Tab  Active   fluticasone-salmeterol (ADVAIR) 100-50 MCG/DOSE AEROSOL POWDER, BREATH ACTIVATED  Active   gabapentin (NEURONTIN) 800 MG tablet  Active   hydrOXYzine HCl (ATARAX) 50 MG Tab  Active   LORazepam (ATIVAN) 1 MG Tab  Active   Menthol-Methyl Salicylate (THERA-GESIC) 0.5-15 % Cream  Active   metoprolol SR (TOPROL XL) 100 MG TABLET SR 24 HR  Active   ondansetron (ZOFRAN ODT) 4 MG TABLET DISPERSIBLE  Active   predniSONE (DELTASONE) 20 MG Tab  Active   traZODone (DESYREL) 150 MG Tab  Active   valacyclovir (VALTREX) 1 GM Tab  Active   VENTOLIN  (90 Base) MCG/ACT Aero Soln inhalation aerosol  Active                    ALLERGIES  No Known Allergies    PHYSICAL EXAM  VITAL SIGNS: BP (!) 154/97   Pulse 71   Temp 36.6 °C (97.8 °F) (Temporal)   Resp 20   Ht 1.676 m (5' 6\")   Wt 63.5 kg (140 lb)   SpO2 88%   BMI 22.60 kg/m²    Constitutional: No acute distress  HEENT: Atraumatic, normocephalic, pupils are equal round reactive to light, nose normal, mouth shows moist mucous membranes  Neck: Supple, no JVD, no tracheal deviation  Cardiovascular: Regular rate and rhythm, no murmur, rub or gallop, 2+ pulses peripherally x4  Thorax & Lungs: No respiratory distress, no wheezes, rales or rhonchi, no chest wall tenderness.  GI: Soft, non-distended, non-tender, no rebound  Skin: Warm, dry, no acute rash or lesion  Musculoskeletal: Moving all extremities, no acute deformity, no edema, no tenderness  Neurologic: A&Ox3, at baseline mentation, cranial nerves II through XII are grossly intact, no sensory deficit, no ataxia  Psychiatric: Appropriate affect for situation at this time      DIAGNOSTIC STUDIES / PROCEDURES  EKG  I have independently interpreted this EKG  Results for orders placed or performed during the hospital encounter of 10/20/23   EKG " (Now)   Result Value Ref Range    Report       [facility] Emergency Dept.    Test Date:  2023-10-20  Pt Name:    NOAH GORDON                 Department: ER  MRN:        [id number]                      Room:       [facility]  Gender:     Female                       Technician: [id number]  :        1965                   Requested By:MAITE VENEGAS  Order #:    [id number]                    Reading MD: Maite Venegas    Measurements  Intervals                                Axis  Rate:       52                           P:          56  CT:         160                          QRS:        33  QRSD:       98                           T:          14  QT:         538  QTc:        501    Interpretive Statements  EKG interpretation: Sinus bradycardia at a rate of 52, normal axis, normal  CT, normal QRS, prolonged QT interval, no ST elevation or depression, T wave  inversion in lead III, V3, V4 and V5.  T wave inversion is new compared to  prior EKG.  Impression diffuse T wave changes without evidence of focal  ischemia.   Electronically Signed On 10- 22:39:37 PDT by Maite Venegas           LABS  Labs Reviewed   CBC WITH DIFFERENTIAL - Abnormal; Notable for the following components:       Result Value    MCH 33.3 (*)     Neutrophils-Polys 87.60 (*)     Lymphocytes 8.90 (*)     Lymphs (Absolute) 0.74 (*)     All other components within normal limits   COMP METABOLIC PANEL - Abnormal; Notable for the following components:    Glucose 116 (*)     All other components within normal limits   URINALYSIS - Abnormal; Notable for the following components:    Ph 8.5 (*)     Glucose 100 (*)     Occult Blood Trace (*)     All other components within normal limits    Narrative:     Release to patient->Immediate   URINE MICROSCOPIC (W/UA) - Abnormal; Notable for the following components:    RBC 10-20 (*)     All other components within normal limits    Narrative:     Release to patient->Immediate   LIPASE    ESTIMATED GFR         RADIOLOGY  I have independently interpreted the diagnostic imaging associated with this visit and am waiting the final reading from the radiologist.   My preliminary interpretation is as follows: No focal consolidation, pneumothorax    Radiologist interpretation:   DX-CHEST-PORTABLE (1 VIEW)   Final Result      No acute cardiac or pulmonary abnormalities are identified.            COURSE & MEDICAL DECISION MAKING    ED Observation Status? No; Patient does not meet criteria for ED Observation.     INITIAL ASSESSMENT, COURSE AND PLAN  Care Narrative:     Patient presents emergency department for evaluation of nausea, vomiting, diarrhea and abdominal cramping.  Benign abdominal examination and stable vitals.  Patient actually states that she feels significantly better without any intervention prior to my evaluation.  Suspect gastroenteritis given symptom constellation and rapid improvement.  Initial EKG demonstrates QT prolongation therefore elected to treat nausea with IV Ativan.  She does have nonspecific T wave inversions new on this EKG however no chest pain or pressure to suggest ACS and clear alternative etiology of diagnoses as above.  This resolved patient's symptoms completely.  She was provided with a liter of IV fluids for rehydration in the setting of dehydration from multiple episodes of vomiting.  Low concern for serious intra-abdominal process warranting CT imaging at this point.  Ultimately patient was discharged in stable condition with a plan for PCP follow-up and return precautions discussed all questions answered.    HYDRATION: Based on the patient's presentation of Dehydration the patient was given IV fluids. IV Hydration was used because oral hydration was not adequate alone. Upon recheck following hydration, the patient was improved.      ADDITIONAL PROBLEM LIST  History of cyclic vomiting syndrome    DISPOSITION AND DISCUSSIONS    Escalation of care considered, and  ultimately not performed:diagnostic imaging CT scan as above not indicated at this point    FINAL IMPRESSION  1. Nausea and vomiting, unspecified vomiting type    2. Diarrhea, unspecified type        PRESCRIPTIONS  Discharge Medication List as of 10/20/2023  5:05 PM          FOLLOW UP  Laura Abrams D.O.  [address]  [address]  [facility] [address]  [phone]    Go in 2 days      [facility], Emergency Dept  [facility] [address] [phone]    As needed, If symptoms worsen        -DISCHARGE-    Electronically signed by: Chan Choudhury M.D., 10/20/2023 3:25 PM

## 2023-10-20 NOTE — DISCHARGE INSTRUCTIONS
I suspect your symptoms are caused by a stomach virus or food poisoning.  Continue to drink plenty of water and eat bland foods for the next few days.  Follow-up with your primary care doctor in 3 to 5 days for recheck.    Return to the emergency department with any severe abdominal pain, fevers, persistent vomiting, vomiting blood or if you are otherwise feeling worse.

## 2023-10-22 ENCOUNTER — HOSPITAL ENCOUNTER (EMERGENCY)
Facility: MEDICAL CENTER | Age: 58
End: 2023-10-22
Attending: EMERGENCY MEDICINE
Payer: MEDICARE

## 2023-10-22 VITALS
OXYGEN SATURATION: 99 % | WEIGHT: 140 LBS | SYSTOLIC BLOOD PRESSURE: 111 MMHG | BODY MASS INDEX: 22.5 KG/M2 | DIASTOLIC BLOOD PRESSURE: 65 MMHG | TEMPERATURE: 97.4 F | RESPIRATION RATE: 17 BRPM | HEIGHT: 66 IN | HEART RATE: 51 BPM

## 2023-10-22 DIAGNOSIS — R11.2 NAUSEA AND VOMITING, UNSPECIFIED VOMITING TYPE: ICD-10-CM

## 2023-10-22 LAB
ALBUMIN SERPL BCP-MCNC: 4.1 G/DL (ref 3.2–4.9)
ALBUMIN/GLOB SERPL: 1.5 G/DL
ALP SERPL-CCNC: 59 U/L (ref 30–99)
ALT SERPL-CCNC: 11 U/L (ref 2–50)
ANION GAP SERPL CALC-SCNC: 13 MMOL/L (ref 7–16)
APPEARANCE UR: ABNORMAL
AST SERPL-CCNC: 22 U/L (ref 12–45)
BACTERIA #/AREA URNS HPF: NEGATIVE /HPF
BASOPHILS # BLD AUTO: 0.2 % (ref 0–1.8)
BASOPHILS # BLD: 0.02 K/UL (ref 0–0.12)
BILIRUB SERPL-MCNC: 0.3 MG/DL (ref 0.1–1.5)
BILIRUB UR QL STRIP.AUTO: NEGATIVE
BUN SERPL-MCNC: 17 MG/DL (ref 8–22)
CALCIUM ALBUM COR SERPL-MCNC: 8.7 MG/DL (ref 8.5–10.5)
CALCIUM SERPL-MCNC: 8.8 MG/DL (ref 8.5–10.5)
CHLORIDE SERPL-SCNC: 104 MMOL/L (ref 96–112)
CO2 SERPL-SCNC: 21 MMOL/L (ref 20–33)
COLOR UR: YELLOW
CREAT SERPL-MCNC: 0.61 MG/DL (ref 0.5–1.4)
EOSINOPHIL # BLD AUTO: 0.06 K/UL (ref 0–0.51)
EOSINOPHIL NFR BLD: 0.7 % (ref 0–6.9)
EPI CELLS #/AREA URNS HPF: NEGATIVE /HPF
ERYTHROCYTE [DISTWIDTH] IN BLOOD BY AUTOMATED COUNT: 46.1 FL (ref 35.9–50)
GFR SERPLBLD CREATININE-BSD FMLA CKD-EPI: 103 ML/MIN/1.73 M 2
GLOBULIN SER CALC-MCNC: 2.7 G/DL (ref 1.9–3.5)
GLUCOSE SERPL-MCNC: 104 MG/DL (ref 65–99)
GLUCOSE UR STRIP.AUTO-MCNC: NEGATIVE MG/DL
HCT VFR BLD AUTO: 38.4 % (ref 37–47)
HGB BLD-MCNC: 13.2 G/DL (ref 12–16)
HYALINE CASTS #/AREA URNS LPF: ABNORMAL /LPF
IMM GRANULOCYTES # BLD AUTO: 0.03 K/UL (ref 0–0.11)
IMM GRANULOCYTES NFR BLD AUTO: 0.4 % (ref 0–0.9)
KETONES UR STRIP.AUTO-MCNC: ABNORMAL MG/DL
LEUKOCYTE ESTERASE UR QL STRIP.AUTO: NEGATIVE
LIPASE SERPL-CCNC: 39 U/L (ref 11–82)
LYMPHOCYTES # BLD AUTO: 0.99 K/UL (ref 1–4.8)
LYMPHOCYTES NFR BLD: 12 % (ref 22–41)
MCH RBC QN AUTO: 33 PG (ref 27–33)
MCHC RBC AUTO-ENTMCNC: 34.4 G/DL (ref 32.2–35.5)
MCV RBC AUTO: 96 FL (ref 81.4–97.8)
MICRO URNS: ABNORMAL
MONOCYTES # BLD AUTO: 0.56 K/UL (ref 0–0.85)
MONOCYTES NFR BLD AUTO: 6.8 % (ref 0–13.4)
NEUTROPHILS # BLD AUTO: 6.59 K/UL (ref 1.82–7.42)
NEUTROPHILS NFR BLD: 79.9 % (ref 44–72)
NITRITE UR QL STRIP.AUTO: NEGATIVE
NRBC # BLD AUTO: 0 K/UL
NRBC BLD-RTO: 0 /100 WBC (ref 0–0.2)
PH UR STRIP.AUTO: 8.5 [PH] (ref 5–8)
PLATELET # BLD AUTO: 202 K/UL (ref 164–446)
PMV BLD AUTO: 10.4 FL (ref 9–12.9)
POTASSIUM SERPL-SCNC: 3.5 MMOL/L (ref 3.6–5.5)
PROT SERPL-MCNC: 6.8 G/DL (ref 6–8.2)
PROT UR QL STRIP: NEGATIVE MG/DL
RBC # BLD AUTO: 4 M/UL (ref 4.2–5.4)
RBC # URNS HPF: ABNORMAL /HPF
RBC UR QL AUTO: ABNORMAL
SODIUM SERPL-SCNC: 138 MMOL/L (ref 135–145)
SP GR UR STRIP.AUTO: 1.02
UROBILINOGEN UR STRIP.AUTO-MCNC: 0.2 MG/DL
WBC # BLD AUTO: 8.3 K/UL (ref 4.8–10.8)
WBC #/AREA URNS HPF: ABNORMAL /HPF

## 2023-10-22 PROCEDURE — 99285 EMERGENCY DEPT VISIT HI MDM: CPT

## 2023-10-22 PROCEDURE — 36415 COLL VENOUS BLD VENIPUNCTURE: CPT

## 2023-10-22 PROCEDURE — 83690 ASSAY OF LIPASE: CPT

## 2023-10-22 PROCEDURE — 96375 TX/PRO/DX INJ NEW DRUG ADDON: CPT

## 2023-10-22 PROCEDURE — 85025 COMPLETE CBC W/AUTO DIFF WBC: CPT

## 2023-10-22 PROCEDURE — 700111 HCHG RX REV CODE 636 W/ 250 OVERRIDE (IP): Mod: UD | Performed by: EMERGENCY MEDICINE

## 2023-10-22 PROCEDURE — 81001 URINALYSIS AUTO W/SCOPE: CPT

## 2023-10-22 PROCEDURE — 80053 COMPREHEN METABOLIC PANEL: CPT

## 2023-10-22 PROCEDURE — 96374 THER/PROPH/DIAG INJ IV PUSH: CPT

## 2023-10-22 RX ORDER — PROCHLORPERAZINE EDISYLATE 5 MG/ML
10 INJECTION INTRAMUSCULAR; INTRAVENOUS ONCE
Status: COMPLETED | OUTPATIENT
Start: 2023-10-22 | End: 2023-10-22

## 2023-10-22 RX ORDER — PROMETHAZINE HYDROCHLORIDE 25 MG/1
25 TABLET ORAL EVERY 6 HOURS PRN
Qty: 30 TABLET | Refills: 0 | Status: SHIPPED | OUTPATIENT
Start: 2023-10-22

## 2023-10-22 RX ORDER — DIPHENHYDRAMINE HYDROCHLORIDE 50 MG/ML
25 INJECTION INTRAMUSCULAR; INTRAVENOUS ONCE
Status: COMPLETED | OUTPATIENT
Start: 2023-10-22 | End: 2023-10-22

## 2023-10-22 RX ADMIN — PROCHLORPERAZINE EDISYLATE 10 MG: 5 INJECTION INTRAMUSCULAR; INTRAVENOUS at 12:38

## 2023-10-22 RX ADMIN — DIPHENHYDRAMINE HYDROCHLORIDE 25 MG: 50 INJECTION, SOLUTION INTRAMUSCULAR; INTRAVENOUS at 12:37

## 2023-10-22 ASSESSMENT — FIBROSIS 4 INDEX: FIB4 SCORE: 1.9

## 2023-10-22 NOTE — ED PROVIDER NOTES
ED PHYSICIAN NOTE    CHIEF COMPLAINT  Chief Complaint   Patient presents with    Nausea/Vomiting/Diarrhea     Seen here 2 days ago for the same and dc'd home, pt states she felt fine yesterday but N/V/D returned this AM, associated generalized abd pain, pt denies any signs of bleeding       EXTERNAL RECORDS REVIEWED  External ED Note quite a few previous encounters for cyclic vomiting, narcotic withdrawal syndrome, cannabis abuse    Patient was in this emergency department 2 days ago with nausea vomiting.    HPI/ROS    OUTSIDE HISTORIAN(S):  EMS report vital signs stable    Arabella Anglin is a 57 y.o. female who presents with nausea and vomiting.  Patient reports that about a week ago she started getting nauseous.  She had significant nausea vomiting diarrhea 2 days ago.  Came to emergency department was treated felt much better upon discharge.  Felt fine the day afterwards, but today again she is having generalized abdominal cramping and vomited several times.  Had soft stool.  She denies dysuria hematuria frequency.  No flank pain.  Denies chest pain or shortness of breath.    Per chart review patient has a history of cannabis induced hyperemesis.  She continues to smoke marijuana daily.  She has never seen a gastroenterologist by her report    PAST MEDICAL HISTORY  Past Medical History:   Diagnosis Date    Anxiety     Chronic back pain     Cyclic vomiting syndrome     Decreased GFR 05/01/2019    Macrocytosis without anemia 02/19/2019    PVC (premature ventricular contraction)     Vomiting 02/15/2019       SOCIAL HISTORY  Social History     Tobacco Use    Smoking status: Every Day     Types: Cigarettes    Smokeless tobacco: Never   Vaping Use    Vaping Use: Never used   Substance Use Topics    Alcohol use: No     Comment: denies    Drug use: Yes     Types: Inhaled, Marijuana       CURRENT MEDICATIONS  Home Medications       Reviewed by Brock Franco R.N. (Registered Nurse) on 10/22/23 at 4383  [facility] List  "Status: Not Addressed     Medication Last Dose Status   albuterol 108 (90 Base) MCG/ACT Aero Soln inhalation aerosol  Active   azithromycin (ZITHROMAX) 250 MG Tab  Active   benzonatate (TESSALON) 100 MG Cap  Active   busPIRone (BUSPAR) 10 MG Tab tablet  Active   famotidine (PEPCID) 20 MG Tab  Active   fluticasone-salmeterol (ADVAIR) 100-50 MCG/DOSE AEROSOL POWDER, BREATH ACTIVATED  Active   gabapentin (NEURONTIN) 800 MG tablet  Active   hydrOXYzine HCl (ATARAX) 50 MG Tab  Active   LORazepam (ATIVAN) 1 MG Tab  Active   Menthol-Methyl Salicylate (THERA-GESIC) 0.5-15 % Cream  Active   metoprolol SR (TOPROL XL) 100 MG TABLET SR 24 HR  Active   ondansetron (ZOFRAN ODT) 4 MG TABLET DISPERSIBLE  Active   predniSONE (DELTASONE) 20 MG Tab  Active   traZODone (DESYREL) 150 MG Tab  Active   valacyclovir (VALTREX) 1 GM Tab  Active   VENTOLIN  (90 Base) MCG/ACT Aero Soln inhalation aerosol  Active                    ALLERGIES  No Known Allergies    PHYSICAL EXAM  VITAL SIGNS: BP (!) 186/95   Pulse (!) 49   Temp 36.1 °C (97 °F) (Oral)   Resp 20   Ht 1.676 m (5' 6\")   Wt 63.5 kg (140 lb)   SpO2 100%   BMI 22.60 kg/m²    Constitutional: Awake and alert.  Tired appearing  HENT: Dry mucous membranes  Eyes: Normal inspection  Neck: Grossly normal range of motion.  Cardiovascular: Normal heart rate, Normal rhythm.  Symmetric peripheral pulses.   Thorax & Lungs: No respiratory distress, No wheezing, No rales, No rhonchi, No chest tenderness.   Abdomen: Bowel sounds normal, soft, non-distended, mild diffuse tenderness  Skin: No obvious rash.  Back: No tenderness, No CVA tenderness.   Extremities: No clubbing, cyanosis, edema, no Homans or cords.  Neurologic: Grossly normal   Psychiatric: Normal for situation     DIAGNOSTIC STUDIES / PROCEDURES  LABS/EKG  Results for orders placed or performed during the hospital encounter of 10/22/23   CBC with Differential   Result Value Ref Range    WBC 8.3 4.8 - 10.8 K/uL    RBC 4.00 (L) " 4.20 - 5.40 M/uL    Hemoglobin 13.2 12.0 - 16.0 g/dL    Hematocrit 38.4 37.0 - 47.0 %    MCV 96.0 81.4 - 97.8 fL    MCH 33.0 27.0 - 33.0 pg    MCHC 34.4 32.2 - 35.5 g/dL    RDW 46.1 35.9 - 50.0 fL    Platelet Count 202 164 - 446 K/uL    MPV 10.4 9.0 - 12.9 fL    Neutrophils-Polys 79.90 (H) 44.00 - 72.00 %    Lymphocytes 12.00 (L) 22.00 - 41.00 %    Monocytes 6.80 0.00 - 13.40 %    Eosinophils 0.70 0.00 - 6.90 %    Basophils 0.20 0.00 - 1.80 %    Immature Granulocytes 0.40 0.00 - 0.90 %    Nucleated RBC 0.00 0.00 - 0.20 /100 WBC    Neutrophils (Absolute) 6.59 1.82 - 7.42 K/uL    Lymphs (Absolute) 0.99 (L) 1.00 - 4.80 K/uL    Monos (Absolute) 0.56 0.00 - 0.85 K/uL    Eos (Absolute) 0.06 0.00 - 0.51 K/uL    Baso (Absolute) 0.02 0.00 - 0.12 K/uL    Immature Granulocytes (abs) 0.03 0.00 - 0.11 K/uL    NRBC (Absolute) 0.00 K/uL   Complete Metabolic Panel   Result Value Ref Range    Sodium 138 135 - 145 mmol/L    Potassium 3.5 (L) 3.6 - 5.5 mmol/L    Chloride 104 96 - 112 mmol/L    Co2 21 20 - 33 mmol/L    Anion Gap 13.0 7.0 - 16.0    Glucose 104 (H) 65 - 99 mg/dL    Bun 17 8 - 22 mg/dL    Creatinine 0.61 0.50 - 1.40 mg/dL    Calcium 8.8 8.5 - 10.5 mg/dL    Correct Calcium 8.7 8.5 - 10.5 mg/dL    AST(SGOT) 22 12 - 45 U/L    ALT(SGPT) 11 2 - 50 U/L    Alkaline Phosphatase 59 30 - 99 U/L    Total Bilirubin 0.3 0.1 - 1.5 mg/dL    Albumin 4.1 3.2 - 4.9 g/dL    Total Protein 6.8 6.0 - 8.2 g/dL    Globulin 2.7 1.9 - 3.5 g/dL    A-G Ratio 1.5 g/dL   Lipase   Result Value Ref Range    Lipase 39 11 - 82 U/L   ESTIMATED GFR   Result Value Ref Range    GFR (CKD-EPI) 103 >60 mL/min/1.73 m 2           COURSE & MEDICAL DECISION MAKING    ED Observation Status? Yes; I am placing the patient in to an observation status due to a diagnostic uncertainty as well as therapeutic intensity. Patient placed in observation status at 12:01 PM, 10/22/2023.     Observation plan is as follows: Obtain diagnostic testing, monitoring, serial exams    Upon  Reevaluation, the patient's condition has: Improved; and will be discharged.    Patient discharged from ED Observation status at 2 PM 10/22/2023    INITIAL ASSESSMENT, COURSE AND PLAN  Care Narrative: Patient presents with with recurrent nausea vomiting.  Has history of cyclic vomiting.  This is similar.  She has a nonsurgical abdominal examination.  Her vital signs were normal.  She did not have a fever.  I ordered laboratory data.  Patient was given IV fluids.  She was given Compazine and Benadryl.    Laboratory data returned reassuring.  No leukocytosis .  No evidence of dehydration normal renal function.  Slightly low potassium amenable to dietary replenishment.  Lipase is normal.    On reassessment the patient was feeling improved.  Repeat abdominal exam was benign.  Discussed problematic marijuana use.  She needs to stop this.  Patient reports she is also on methadone and is trying to taper down with help through the methadone clinic.  They are very gradually going down on her dose.  This may be contributing to her symptoms.  Given recurrent nausea vomiting she would benefit from GI evaluation and consideration of upper endoscopy.  I informed the patient of this.  She voiced understanding.  Patient will be discharged to return to the ER if she has fever, uncontrolled vomiting, worsening symptoms or concern.          ADDITIONAL PROBLEM LIST  Marijuana abuse-I advised cessation    DISPOSITION AND DISCUSSIONS    Escalation of care considered, and ultimately not performed:diagnostic imaging and acute inpatient care management, however at this time, the patient is most appropriate for outpatient management    Prescription drugs considered and/or prescribed: Prescribed Phenergan    FINAL IMPRESSION  1.  Nausea and vomiting  2.  Suspected cannabis induced hyperemesis    This dictation was created using voice recognition software. The accuracy of the dictation is limited to the abilities of the software. I expect  there may be some errors of grammar and possibly content. The nursing notes were reviewed and certain aspects of this information were incorporated into this note.    Electronically signed by: Gerald Matt M.D., 10/22/2023

## 2023-10-22 NOTE — ED TRIAGE NOTES
"Chief Complaint   Patient presents with    Nausea/Vomiting/Diarrhea     Seen here 2 days ago for the same and dc'd home, pt states she felt fine yesterday but N/V/D returned this AM, associated generalized abd pain, pt denies any signs of bleeding     Pt BIB EMS for above complaints, VSS on RA, GCS 15, pt in obvious discomfort.    Hx cyclic vomiting, endorses daily marijuana use including this AM.    BP (!) 186/95   Pulse (!) 49   Temp 36.1 °C (97 °F) (Oral)   Resp 20   Ht 1.676 m (5' 6\")   Wt 63.5 kg (140 lb)   SpO2 100%   BMI 22.60 kg/m²     "

## 2024-05-14 ENCOUNTER — HOSPITAL ENCOUNTER (OUTPATIENT)
Facility: MEDICAL CENTER | Age: 59
End: 2024-05-14
Attending: EMERGENCY MEDICINE | Admitting: HOSPITALIST
Payer: MEDICARE

## 2024-05-14 ENCOUNTER — APPOINTMENT (OUTPATIENT)
Dept: RADIOLOGY | Facility: MEDICAL CENTER | Age: 59
End: 2024-05-14
Attending: EMERGENCY MEDICINE
Payer: MEDICARE

## 2024-05-14 VITALS
TEMPERATURE: 100 F | HEART RATE: 56 BPM | SYSTOLIC BLOOD PRESSURE: 179 MMHG | HEIGHT: 66 IN | RESPIRATION RATE: 18 BRPM | BODY MASS INDEX: 21.97 KG/M2 | WEIGHT: 136.69 LBS | DIASTOLIC BLOOD PRESSURE: 85 MMHG | OXYGEN SATURATION: 99 %

## 2024-05-14 DIAGNOSIS — R11.2 NAUSEA AND VOMITING, UNSPECIFIED VOMITING TYPE: ICD-10-CM

## 2024-05-14 PROBLEM — G89.29 CHRONIC BACK PAIN: Status: ACTIVE | Noted: 2024-05-14

## 2024-05-14 PROBLEM — M54.9 CHRONIC BACK PAIN: Status: ACTIVE | Noted: 2024-05-14

## 2024-05-14 LAB
ALBUMIN SERPL BCP-MCNC: 4.3 G/DL (ref 3.2–4.9)
ALBUMIN/GLOB SERPL: 1.7 G/DL
ALP SERPL-CCNC: 71 U/L (ref 30–99)
ALT SERPL-CCNC: 14 U/L (ref 2–50)
ANION GAP SERPL CALC-SCNC: 17 MMOL/L (ref 7–16)
APPEARANCE UR: CLEAR
AST SERPL-CCNC: 19 U/L (ref 12–45)
BACTERIA #/AREA URNS HPF: NEGATIVE /HPF
BASOPHILS # BLD AUTO: 0.1 % (ref 0–1.8)
BASOPHILS # BLD: 0.01 K/UL (ref 0–0.12)
BILIRUB SERPL-MCNC: 0.5 MG/DL (ref 0.1–1.5)
BILIRUB UR QL STRIP.AUTO: NEGATIVE
BUN SERPL-MCNC: 12 MG/DL (ref 8–22)
CALCIUM ALBUM COR SERPL-MCNC: 8.8 MG/DL (ref 8.5–10.5)
CALCIUM SERPL-MCNC: 9 MG/DL (ref 8.5–10.5)
CHLORIDE SERPL-SCNC: 105 MMOL/L (ref 96–112)
CO2 SERPL-SCNC: 20 MMOL/L (ref 20–33)
COLOR UR: YELLOW
CREAT SERPL-MCNC: 0.6 MG/DL (ref 0.5–1.4)
EKG IMPRESSION: NORMAL
EOSINOPHIL # BLD AUTO: 0 K/UL (ref 0–0.51)
EOSINOPHIL NFR BLD: 0 % (ref 0–6.9)
EPI CELLS #/AREA URNS HPF: NEGATIVE /HPF
ERYTHROCYTE [DISTWIDTH] IN BLOOD BY AUTOMATED COUNT: 43.4 FL (ref 35.9–50)
GFR SERPLBLD CREATININE-BSD FMLA CKD-EPI: 103 ML/MIN/1.73 M 2
GLOBULIN SER CALC-MCNC: 2.5 G/DL (ref 1.9–3.5)
GLUCOSE SERPL-MCNC: 131 MG/DL (ref 65–99)
GLUCOSE UR STRIP.AUTO-MCNC: NEGATIVE MG/DL
HCT VFR BLD AUTO: 39.4 % (ref 37–47)
HGB BLD-MCNC: 13.7 G/DL (ref 12–16)
HYALINE CASTS #/AREA URNS LPF: ABNORMAL /LPF
IMM GRANULOCYTES # BLD AUTO: 0.02 K/UL (ref 0–0.11)
IMM GRANULOCYTES NFR BLD AUTO: 0.3 % (ref 0–0.9)
KETONES UR STRIP.AUTO-MCNC: 15 MG/DL
LEUKOCYTE ESTERASE UR QL STRIP.AUTO: NEGATIVE
LIPASE SERPL-CCNC: 16 U/L (ref 11–82)
LYMPHOCYTES # BLD AUTO: 0.54 K/UL (ref 1–4.8)
LYMPHOCYTES NFR BLD: 7.7 % (ref 22–41)
MCH RBC QN AUTO: 33.2 PG (ref 27–33)
MCHC RBC AUTO-ENTMCNC: 34.8 G/DL (ref 32.2–35.5)
MCV RBC AUTO: 95.4 FL (ref 81.4–97.8)
MICRO URNS: ABNORMAL
MONOCYTES # BLD AUTO: 0.1 K/UL (ref 0–0.85)
MONOCYTES NFR BLD AUTO: 1.4 % (ref 0–13.4)
NEUTROPHILS # BLD AUTO: 6.36 K/UL (ref 1.82–7.42)
NEUTROPHILS NFR BLD: 90.5 % (ref 44–72)
NITRITE UR QL STRIP.AUTO: NEGATIVE
NRBC # BLD AUTO: 0 K/UL
NRBC BLD-RTO: 0 /100 WBC (ref 0–0.2)
PH UR STRIP.AUTO: 8 [PH] (ref 5–8)
PLATELET # BLD AUTO: 216 K/UL (ref 164–446)
PMV BLD AUTO: 10.5 FL (ref 9–12.9)
POTASSIUM SERPL-SCNC: 3.2 MMOL/L (ref 3.6–5.5)
PROT SERPL-MCNC: 6.8 G/DL (ref 6–8.2)
PROT UR QL STRIP: NEGATIVE MG/DL
RBC # BLD AUTO: 4.13 M/UL (ref 4.2–5.4)
RBC # URNS HPF: ABNORMAL /HPF
RBC UR QL AUTO: ABNORMAL
SODIUM SERPL-SCNC: 142 MMOL/L (ref 135–145)
SP GR UR STRIP.AUTO: 1.02
TROPONIN T SERPL-MCNC: 7 NG/L (ref 6–19)
UROBILINOGEN UR STRIP.AUTO-MCNC: 0.2 MG/DL
WBC # BLD AUTO: 7 K/UL (ref 4.8–10.8)
WBC #/AREA URNS HPF: ABNORMAL /HPF

## 2024-05-14 PROCEDURE — 99222 1ST HOSP IP/OBS MODERATE 55: CPT | Performed by: HOSPITALIST

## 2024-05-14 RX ORDER — MORPHINE SULFATE 4 MG/ML
4 INJECTION INTRAVENOUS ONCE
Status: COMPLETED | OUTPATIENT
Start: 2024-05-14 | End: 2024-05-14

## 2024-05-14 RX ORDER — ONDANSETRON 2 MG/ML
4 INJECTION INTRAMUSCULAR; INTRAVENOUS EVERY 4 HOURS PRN
Status: DISCONTINUED | OUTPATIENT
Start: 2024-05-14 | End: 2024-05-14 | Stop reason: HOSPADM

## 2024-05-14 RX ORDER — METOCLOPRAMIDE HYDROCHLORIDE 5 MG/ML
10 INJECTION INTRAMUSCULAR; INTRAVENOUS EVERY 6 HOURS
Status: DISCONTINUED | OUTPATIENT
Start: 2024-05-14 | End: 2024-05-14 | Stop reason: HOSPADM

## 2024-05-14 RX ORDER — NICOTINE 21 MG/24HR
14 PATCH, TRANSDERMAL 24 HOURS TRANSDERMAL
Status: DISCONTINUED | OUTPATIENT
Start: 2024-05-14 | End: 2024-05-14 | Stop reason: HOSPADM

## 2024-05-14 RX ORDER — ONDANSETRON 2 MG/ML
4 INJECTION INTRAMUSCULAR; INTRAVENOUS ONCE
Status: COMPLETED | OUTPATIENT
Start: 2024-05-14 | End: 2024-05-14

## 2024-05-14 RX ORDER — METHADONE HYDROCHLORIDE 10 MG/1
10 TABLET ORAL ONCE
Status: COMPLETED | OUTPATIENT
Start: 2024-05-14 | End: 2024-05-14

## 2024-05-14 RX ORDER — CELECOXIB 200 MG/1
200 CAPSULE ORAL DAILY
COMMUNITY
Start: 2024-04-25

## 2024-05-14 RX ORDER — KETOROLAC TROMETHAMINE 15 MG/ML
15 INJECTION, SOLUTION INTRAMUSCULAR; INTRAVENOUS EVERY 6 HOURS PRN
Status: DISCONTINUED | OUTPATIENT
Start: 2024-05-14 | End: 2024-05-14 | Stop reason: HOSPADM

## 2024-05-14 RX ORDER — SODIUM CHLORIDE 9 MG/ML
INJECTION, SOLUTION INTRAVENOUS CONTINUOUS
Status: DISCONTINUED | OUTPATIENT
Start: 2024-05-14 | End: 2024-05-14 | Stop reason: HOSPADM

## 2024-05-14 RX ORDER — ONDANSETRON 4 MG/1
4 TABLET, ORALLY DISINTEGRATING ORAL EVERY 4 HOURS PRN
Status: DISCONTINUED | OUTPATIENT
Start: 2024-05-14 | End: 2024-05-14 | Stop reason: HOSPADM

## 2024-05-14 RX ORDER — MORPHINE SULFATE 4 MG/ML
2 INJECTION INTRAVENOUS
Status: DISCONTINUED | OUTPATIENT
Start: 2024-05-14 | End: 2024-05-14 | Stop reason: HOSPADM

## 2024-05-14 RX ORDER — HYDRALAZINE HYDROCHLORIDE 20 MG/ML
10 INJECTION INTRAMUSCULAR; INTRAVENOUS EVERY 4 HOURS PRN
Status: DISCONTINUED | OUTPATIENT
Start: 2024-05-14 | End: 2024-05-14 | Stop reason: HOSPADM

## 2024-05-14 RX ORDER — ENOXAPARIN SODIUM 100 MG/ML
40 INJECTION SUBCUTANEOUS DAILY
Status: DISCONTINUED | OUTPATIENT
Start: 2024-05-14 | End: 2024-05-14 | Stop reason: HOSPADM

## 2024-05-14 RX ORDER — BUSPIRONE HYDROCHLORIDE 10 MG/1
10 TABLET ORAL 2 TIMES DAILY
Status: DISCONTINUED | OUTPATIENT
Start: 2024-05-14 | End: 2024-05-14

## 2024-05-14 RX ORDER — PROCHLORPERAZINE EDISYLATE 5 MG/ML
5-10 INJECTION INTRAMUSCULAR; INTRAVENOUS EVERY 4 HOURS PRN
Status: DISCONTINUED | OUTPATIENT
Start: 2024-05-14 | End: 2024-05-14 | Stop reason: HOSPADM

## 2024-05-14 RX ORDER — TRAZODONE HYDROCHLORIDE 50 MG/1
150 TABLET ORAL NIGHTLY
Status: DISCONTINUED | OUTPATIENT
Start: 2024-05-14 | End: 2024-05-14 | Stop reason: HOSPADM

## 2024-05-14 RX ORDER — METOCLOPRAMIDE HYDROCHLORIDE 5 MG/ML
10 INJECTION INTRAMUSCULAR; INTRAVENOUS ONCE
Status: COMPLETED | OUTPATIENT
Start: 2024-05-14 | End: 2024-05-14

## 2024-05-14 RX ORDER — METHADONE HYDROCHLORIDE 40 MG/1
40 TABLET ORAL ONCE
Status: COMPLETED | OUTPATIENT
Start: 2024-05-14 | End: 2024-05-14

## 2024-05-14 RX ORDER — METHADONE HYDROCHLORIDE 40 MG/1
100 TABLET ORAL ONCE
Status: DISCONTINUED | OUTPATIENT
Start: 2024-05-14 | End: 2024-05-14

## 2024-05-14 RX ORDER — SODIUM CHLORIDE 9 MG/ML
1000 INJECTION, SOLUTION INTRAVENOUS ONCE
Status: COMPLETED | OUTPATIENT
Start: 2024-05-14 | End: 2024-05-14

## 2024-05-14 RX ORDER — PROMETHAZINE HYDROCHLORIDE 25 MG/1
12.5-25 SUPPOSITORY RECTAL EVERY 4 HOURS PRN
Status: DISCONTINUED | OUTPATIENT
Start: 2024-05-14 | End: 2024-05-14 | Stop reason: HOSPADM

## 2024-05-14 RX ORDER — PROMETHAZINE HYDROCHLORIDE 25 MG/1
12.5-25 TABLET ORAL EVERY 4 HOURS PRN
Status: DISCONTINUED | OUTPATIENT
Start: 2024-05-14 | End: 2024-05-14 | Stop reason: HOSPADM

## 2024-05-14 RX ADMIN — METOCLOPRAMIDE 10 MG: 5 INJECTION, SOLUTION INTRAMUSCULAR; INTRAVENOUS at 13:13

## 2024-05-14 RX ADMIN — METHADONE HYDROCHLORIDE 40 MG: 40 TABLET ORAL at 13:15

## 2024-05-14 RX ADMIN — FAMOTIDINE 20 MG: 10 INJECTION, SOLUTION INTRAVENOUS at 17:31

## 2024-05-14 RX ADMIN — METOCLOPRAMIDE 10 MG: 5 INJECTION, SOLUTION INTRAMUSCULAR; INTRAVENOUS at 17:31

## 2024-05-14 RX ADMIN — METOCLOPRAMIDE 10 MG: 5 INJECTION, SOLUTION INTRAMUSCULAR; INTRAVENOUS at 15:10

## 2024-05-14 RX ADMIN — SODIUM CHLORIDE: 9 INJECTION, SOLUTION INTRAVENOUS at 15:10

## 2024-05-14 RX ADMIN — NICOTINE 14 MG: 14 PATCH TRANSDERMAL at 15:12

## 2024-05-14 RX ADMIN — ONDANSETRON 4 MG: 2 INJECTION INTRAMUSCULAR; INTRAVENOUS at 10:41

## 2024-05-14 RX ADMIN — METHADONE HYDROCHLORIDE 10 MG: 10 TABLET ORAL at 13:15

## 2024-05-14 RX ADMIN — MORPHINE SULFATE 4 MG: 4 INJECTION INTRAVENOUS at 10:40

## 2024-05-14 RX ADMIN — METOCLOPRAMIDE 10 MG: 5 INJECTION, SOLUTION INTRAMUSCULAR; INTRAVENOUS at 11:12

## 2024-05-14 RX ADMIN — SODIUM CHLORIDE 1000 ML: 9 INJECTION, SOLUTION INTRAVENOUS at 10:41

## 2024-05-14 RX ADMIN — MORPHINE SULFATE 4 MG: 4 INJECTION INTRAVENOUS at 13:15

## 2024-05-14 ASSESSMENT — PATIENT HEALTH QUESTIONNAIRE - PHQ9
SUM OF ALL RESPONSES TO PHQ9 QUESTIONS 1 AND 2: 0
2. FEELING DOWN, DEPRESSED, IRRITABLE, OR HOPELESS: NOT AT ALL
1. LITTLE INTEREST OR PLEASURE IN DOING THINGS: NOT AT ALL

## 2024-05-14 ASSESSMENT — LIFESTYLE VARIABLES
ON A TYPICAL DAY WHEN YOU DRINK ALCOHOL HOW MANY DRINKS DO YOU HAVE: 0
TOTAL SCORE: 0
HAVE PEOPLE ANNOYED YOU BY CRITICIZING YOUR DRINKING: NO
ALCOHOL_USE: NO
TOTAL SCORE: 0
HOW MANY TIMES IN THE PAST YEAR HAVE YOU HAD 5 OR MORE DRINKS IN A DAY: 0
AVERAGE NUMBER OF DAYS PER WEEK YOU HAVE A DRINK CONTAINING ALCOHOL: 0
TOTAL SCORE: 0
HAVE YOU EVER FELT YOU SHOULD CUT DOWN ON YOUR DRINKING: NO
EVER FELT BAD OR GUILTY ABOUT YOUR DRINKING: NO
EVER HAD A DRINK FIRST THING IN THE MORNING TO STEADY YOUR NERVES TO GET RID OF A HANGOVER: NO
CONSUMPTION TOTAL: NEGATIVE

## 2024-05-14 ASSESSMENT — ENCOUNTER SYMPTOMS
RESPIRATORY NEGATIVE: 1
ABDOMINAL PAIN: 1
NAUSEA: 1
CONSTITUTIONAL NEGATIVE: 1
CARDIOVASCULAR NEGATIVE: 1
NEUROLOGICAL NEGATIVE: 1
BACK PAIN: 1
DIARRHEA: 1
PSYCHIATRIC NEGATIVE: 1

## 2024-05-14 ASSESSMENT — FIBROSIS 4 INDEX
FIB4 SCORE: 1.94
FIB4 SCORE: 1.39

## 2024-05-14 ASSESSMENT — PAIN DESCRIPTION - PAIN TYPE
TYPE: ACUTE PAIN;CHRONIC PAIN
TYPE: ACUTE PAIN
TYPE: ACUTE PAIN;CHRONIC PAIN

## 2024-05-14 NOTE — ED TRIAGE NOTES
"Chief Complaint   Patient presents with    Nausea/Vomiting/Diarrhea     Pt BIB EMS from home for N/V/D since 0400. Per ems pt has been on methadone for past year. Pt given 4 mg zofran en route. Pt c/o cramping abdominal pain and back pain.      Blood Pressure: (!) 191/98, Pulse: (!) 52, Respiration: (!) 21, Temperature: 36.5 °C (97.7 °F), Height: 167.6 cm (5' 6\"), Weight: 63.5 kg (140 lb), BMI (Calculated): 22.6, BSA (Calculated): 1.7, Pulse Oximetry: 96 %, O2 Delivery Device: None - Room Air    "

## 2024-05-14 NOTE — ED NOTES
Med Rec complete per patient   Allergies reviewed  Antibiotics in the past 30 days:no  Anticoagulant in past 14 days:no  Pharmacy patient utilizes:[facility]    Pt unable to recall if took celebrex today or yesterday    [facility] verified Methadone

## 2024-05-14 NOTE — ED NOTES
Called [facility] ([phone])  donna 5/14 at 1240 spoke with SANDRA toth who informed me of the patient's home methadone dose and of this morning medication administration situation with vomiting.     Patient went to clinic on 5/14/2024 this morning and received methadone 111 mg liquid then vomited dose up at the clinic in front of the staff. The clinic then gave her methadone 55 mg liquid which she also vomited dose up at the clinic in front of the staff.     The clinic then sent her home with two take home doses. The patient left clinic took the methadone 55 mg liquid and vomited that up prior to coming to the ER.     Patient's regular methadone dose is 111 mg once daily             Migdalia [place], Pharm.D., [facility]  ER Clinical Pharmacist

## 2024-05-14 NOTE — H&P
Hospital Medicine History & Physical Note    Date of Service  5/14/2024    Primary Care Physician  Laura Abrams D.O.    Consultants      Code Status  Full Code    Chief Complaint  Chief Complaint   Patient presents with    Nausea/Vomiting/Diarrhea     Pt BIB EMS from home for N/V/D since 0400. Per ems pt has been on methadone for past year. Pt given 4 mg zofran en route. Pt c/o cramping abdominal pain and back pain.        History of Presenting Illness  Arabella Anglin is a 58 y.o. female who presented 5/14/2024 with pmx chronic back pain who presents with n/v x 1 day duration. No fever, chills. Patient unable to take her chronic  methadone. She presents  to [facility] ed for further evaluation. In addition to her chronic pain. Patient is also c/o intermittent crampy abdominal pain        I discussed the plan of care with patient.    Review of Systems  Review of Systems   Constitutional: Negative.    HENT: Negative.     Respiratory: Negative.     Cardiovascular: Negative.    Gastrointestinal:  Positive for abdominal pain, diarrhea and nausea.   Genitourinary: Negative.    Musculoskeletal:  Positive for back pain.   Skin: Negative.    Neurological: Negative.    Psychiatric/Behavioral: Negative.         Past Medical History   has a past medical history of Anxiety, Chronic back pain, Cyclic vomiting syndrome, Decreased GFR (05/01/2019), Macrocytosis without anemia (02/19/2019), PVC (premature ventricular contraction), and Vomiting (02/15/2019).    Surgical History   has a past surgical history that includes open reduction; primary c section; tonsillectomy; knee arthroscopy; and pr breast augmentation with implant.     Family History  family history includes Diabetes in her mother; Heart Disease in her mother; Psychiatric Illness in her father.   Family history reviewed with patient. There is no family history that is pertinent to the chief complaint.     Social History   reports that she has been smoking  cigarettes. She has never used smokeless tobacco. She reports current drug use. Drugs: Inhaled and Marijuana. She reports that she does not drink alcohol.    Allergies  No Known Allergies    Medications  Prior to Admission Medications   Prescriptions Last Dose Informant Patient Reported? Taking?   METHADONE HCL PO 5/14/2024 at AM Patient Yes Yes   Sig: Take 111 mg by mouth every day. [facility] [place] [facility]   celecoxib (CELEBREX) 200 MG Cap UNK at UNK Patient Yes Yes   Sig: Take 200 mg by mouth every day.   traZODone (DESYREL) 150 MG Tab 5/13/2024 at PM Patient No Yes   Sig: TAKE 1 TABLET BY MOUTH ONCE DAILY AT BEDTIME   Patient taking differently: Take 150 mg by mouth every evening.      Facility-Administered Medications: None       Physical Exam  Temp:  [36.5 °C (97.7 °F)-37.8 °C (100 °F)] 37.8 °C (100 °F)  Pulse:  [52-74] 56  Resp:  [18-21] 18  BP: (160-191)/(72-99) 179/85  SpO2:  [92 %-99 %] 99 %  Blood Pressure: (!) 179/85   Temperature: 37.8 °C (100 °F)   Pulse: (!) 56   Respiration: 18   Pulse Oximetry: 99 %       Physical Exam  Constitutional:       General: She is not in acute distress.     Appearance: She is ill-appearing. She is not toxic-appearing or diaphoretic.   Eyes:      Extraocular Movements: Extraocular movements intact.      Pupils: Pupils are equal, round, and reactive to light.   Cardiovascular:      Rate and Rhythm: Normal rate and regular rhythm.      Heart sounds: No murmur heard.     No friction rub. No gallop.   Pulmonary:      Effort: No respiratory distress.      Breath sounds: No stridor. No wheezing, rhonchi or rales.   Chest:      Chest wall: No tenderness.   Abdominal:      General: There is no distension.      Palpations: There is no mass.      Tenderness: There is no abdominal tenderness. There is no guarding or rebound.      Hernia: No hernia is present.   Musculoskeletal:         General: No swelling, tenderness, deformity or signs of injury. Normal range of  "motion.      Cervical back: Normal range of motion.      Left lower leg: No edema.   Skin:     Capillary Refill: Capillary refill takes 2 to 3 seconds.      Coloration: Skin is not jaundiced or pale.      Findings: No bruising or erythema.   Neurological:      General: No focal deficit present.      Mental Status: She is oriented to person, place, and time.      Cranial Nerves: No cranial nerve deficit.      Motor: No weakness.   Psychiatric:         Mood and Affect: Mood normal.         Behavior: Behavior normal.         Laboratory:  Recent Labs     05/14/24  1018   WBC 7.0   RBC 4.13*   HEMOGLOBIN 13.7   HEMATOCRIT 39.4   MCV 95.4   MCH 33.2*   MCHC 34.8   RDW 43.4   PLATELETCT 216   MPV 10.5     Recent Labs     05/14/24  1018   SODIUM 142   POTASSIUM 3.2*   CHLORIDE 105   CO2 20   GLUCOSE 131*   BUN 12   CREATININE 0.60   CALCIUM 9.0     Recent Labs     05/14/24  1018   ALTSGPT 14   ASTSGOT 19   ALKPHOSPHAT 71   TBILIRUBIN 0.5   LIPASE 16   GLUCOSE 131*         No results for input(s): \"NTPROBNP\" in the last 72 hours.      Recent Labs     05/14/24  1018   TROPONINT 7       Imaging:  [id number] [id number]   Final Result      No dilated bowel loops or free air.          X-Ray:  I have personally reviewed the images and compared with prior images.    Assessment/Plan:  Justification for Admission Status  I anticipate this patient is appropriate for observation status at this time because         * Intractable nausea and vomiting- (present on admission)  Assessment & Plan  Ivf hydration  Prn iv zofran    Iv reglkan    Iv pepcid    Clear liquid diet    Chronic back pain- (present on admission)  Assessment & Plan  Iv morphine until patient is able to take po    Anxiety- (present on admission)  Assessment & Plan  Cont buspar    Tobacco dependence- (present on admission)  Assessment & Plan  Spent 3 minutes on smoking cessation education  19188 (smoking and tobacco cessation counseling visit; 3-10 min)     Nicotine patch " ordered        VTE prophylaxis: SCDs/TEDs

## 2024-05-14 NOTE — PROGRESS NOTES
2 RN Skin Assessment Completed by Rhea RN and SANDRA Vinson.     Head: WDL  Ears: WDL  Nose: WDL  Mouth: WDL  Neck: WDL  Breasts/Chest: WDL  Shoulder Blades: Left shoulder bruise  Spine: WDL  (R) Arm/Elbow/hand: WDL  (L) Arm/Elbow/hand: Left upper arm bruise  Abdomen:WDL  Groin: WDL  Sacrum/Coccyx/Buttocks: blanching  (R) Leg: knee bruise, scabbing  (L) Leg: Knee bruise, old scar.  (R) Heel/Foot/Toe: blanching  (L) Heel/Foot/Toe: blanching              Devices in place: BP Cuff and Pulse Ox     Interventions in place: Gray ear foams and Pillows     Possible skin injury found: No     Pictures uploaded into Epic: N/A  Wound Consult Placed: N/A

## 2024-05-14 NOTE — ED PROVIDER NOTES
ED Provider Note    CHIEF COMPLAINT  Chief Complaint   Patient presents with    Nausea/Vomiting/Diarrhea     Pt BIB EMS from home for N/V/D since 0400. Per ems pt has been on methadone for past year. Pt given 4 mg zofran en route. Pt c/o cramping abdominal pain and back pain.        EXTERNAL RECORDS REVIEWED  Outpatient Notes   [facility]    HPI/ROS  LIMITATION TO HISTORY   Select: : None  OUTSIDE HISTORIAN(S):  None    Arabella Anglin is a 58 y.o. female who presents here for evaluation of nausea vomiting diarrhea.  Patient states she has had multiple episodes of vomiting and diarrhea since this morning.  She has intermittent epigastric pain, that improves after the vomiting.  She is unsure as if she ate bad food.  Patient states that she tried to take her methadone yesterday and today.  She said yesterday she vomited, then today went to the clinic and had 2 episodes of vomiting after 2 doses.  She states this was immediate, and she has been having vomiting throughout the day.  No chest pain shortness of breath.    PAST MEDICAL HISTORY   has a past medical history of Anxiety, Chronic back pain, Cyclic vomiting syndrome, Decreased GFR (05/01/2019), Macrocytosis without anemia (02/19/2019), PVC (premature ventricular contraction), and Vomiting (02/15/2019).    SURGICAL HISTORY   has a past surgical history that includes open reduction; primary c section; tonsillectomy; knee arthroscopy; and breast augmentation with implant.    FAMILY HISTORY  Family History   Problem Relation Age of Onset    Heart Disease Mother     Diabetes Mother     Psychiatric Illness Father        SOCIAL HISTORY  Social History     Tobacco Use    Smoking status: Every Day     Types: Cigarettes    Smokeless tobacco: Never   Vaping Use    Vaping Use: Never used   Substance and Sexual Activity    Alcohol use: No     Comment: denies    Drug use: Yes     Types: Inhaled, Marijuana    Sexual activity: Not on file       CURRENT  "MEDICATIONS  Home Medications       Reviewed by Anahi Abebe R.N. (Registered Nurse) on 05/14/24 at 1019  Med List Status: Not Addressed     Medication Last Dose Status   albuterol 108 (90 Base) MCG/ACT Aero Soln inhalation aerosol  Active   azithromycin (ZITHROMAX) 250 MG Tab  Active   benzonatate (TESSALON) 100 MG Cap  Active   busPIRone (BUSPAR) 10 MG Tab tablet  Active   famotidine (PEPCID) 20 MG Tab  Active   fluticasone-salmeterol (ADVAIR) 100-50 MCG/DOSE AEROSOL POWDER, BREATH ACTIVATED  Active   gabapentin (NEURONTIN) 800 MG tablet  Active   hydrOXYzine HCl (ATARAX) 50 MG Tab  Active   LORazepam (ATIVAN) 1 MG Tab  Active   Menthol-Methyl Salicylate (THERA-GESIC) 0.5-15 % Cream  Active   metoprolol SR (TOPROL XL) 100 MG TABLET SR 24 HR  Active   ondansetron (ZOFRAN ODT) 4 MG TABLET DISPERSIBLE  Active   predniSONE (DELTASONE) 20 MG Tab  Active   promethazine (PHENERGAN) 25 MG Tab  Active   traZODone (DESYREL) 150 MG Tab  Active   valacyclovir (VALTREX) 1 GM Tab  Active   VENTOLIN  (90 Base) MCG/ACT Aero Soln inhalation aerosol  Active                    ALLERGIES  No Known Allergies    PHYSICAL EXAM  VITAL SIGNS: BP (!) 160/72   Pulse 73   Temp 36.5 °C (97.7 °F) (Temporal)   Resp 20   Ht 1.676 m (5' 6\")   Wt 63.5 kg (140 lb)   SpO2 96%   BMI 22.60 kg/m²    Constitutional: Well developed, well nourished. mild acute distress.  HEENT: Normocephalic, atraumatic. Posterior pharynx clear and moist.  Eyes:  EOMI. Normal sclera.  Neck: Supple, Full range of motion, nontender.  Chest/Pulmonary: clear to ausculation. Symmetrical expansion.   Cardio: Regular rate and rhythm with no murmur.   Abdomen:  mild epigastric tenderness, No peritoneal signs. No guarding. No palpable masses.  Musculoskeletal: No deformity, no edema, neurovascular intact.   Neuro: Clear speech, appropriate, cooperative, cranial nerves II-XII grossly intact.  Psych: anxious  mood and affect      EKG/LABS  Results for orders placed or " performed during the hospital encounter of 05/14/24   CBC WITH DIFFERENTIAL   Result Value Ref Range    WBC 7.0 4.8 - 10.8 K/uL    RBC 4.13 (L) 4.20 - 5.40 M/uL    Hemoglobin 13.7 12.0 - 16.0 g/dL    Hematocrit 39.4 37.0 - 47.0 %    MCV 95.4 81.4 - 97.8 fL    MCH 33.2 (H) 27.0 - 33.0 pg    MCHC 34.8 32.2 - 35.5 g/dL    RDW 43.4 35.9 - 50.0 fL    Platelet Count 216 164 - 446 K/uL    MPV 10.5 9.0 - 12.9 fL    Neutrophils-Polys 90.50 (H) 44.00 - 72.00 %    Lymphocytes 7.70 (L) 22.00 - 41.00 %    Monocytes 1.40 0.00 - 13.40 %    Eosinophils 0.00 0.00 - 6.90 %    Basophils 0.10 0.00 - 1.80 %    Immature Granulocytes 0.30 0.00 - 0.90 %    Nucleated RBC 0.00 0.00 - 0.20 /100 WBC    Neutrophils (Absolute) 6.36 1.82 - 7.42 K/uL    Lymphs (Absolute) 0.54 (L) 1.00 - 4.80 K/uL    Monos (Absolute) 0.10 0.00 - 0.85 K/uL    Eos (Absolute) 0.00 0.00 - 0.51 K/uL    Baso (Absolute) 0.01 0.00 - 0.12 K/uL    Immature Granulocytes (abs) 0.02 0.00 - 0.11 K/uL    NRBC (Absolute) 0.00 K/uL   COMP METABOLIC PANEL   Result Value Ref Range    Sodium 142 135 - 145 mmol/L    Potassium 3.2 (L) 3.6 - 5.5 mmol/L    Chloride 105 96 - 112 mmol/L    Co2 20 20 - 33 mmol/L    Anion Gap 17.0 (H) 7.0 - 16.0    Glucose 131 (H) 65 - 99 mg/dL    Bun 12 8 - 22 mg/dL    Creatinine 0.60 0.50 - 1.40 mg/dL    Calcium 9.0 8.5 - 10.5 mg/dL    Correct Calcium 8.8 8.5 - 10.5 mg/dL    AST(SGOT) 19 12 - 45 U/L    ALT(SGPT) 14 2 - 50 U/L    Alkaline Phosphatase 71 30 - 99 U/L    Total Bilirubin 0.5 0.1 - 1.5 mg/dL    Albumin 4.3 3.2 - 4.9 g/dL    Total Protein 6.8 6.0 - 8.2 g/dL    Globulin 2.5 1.9 - 3.5 g/dL    A-G Ratio 1.7 g/dL   LIPASE   Result Value Ref Range    Lipase 16 11 - 82 U/L   TROPONIN   Result Value Ref Range    Troponin T 7 6 - 19 ng/L   ESTIMATED GFR   Result Value Ref Range    GFR (CKD-EPI) 103 >60 mL/min/1.73 m 2   EKG   Result Value Ref Range    Report       [facility]    Test Date:  2024-05-14  Pt Name:    NOAH  HEIDI                 Department: ER  MRN:        [id number]                      Room:       [facility]  Gender:     Female                       Technician: [id number]  :        1965                   Requested By:ER TRIAGE PROTOCOL  Order #:    [id number]                    Reading MD:    Measurements  Intervals                                Axis  Rate:       62                           P:          92  RI:         157                          QRS:        58  QRSD:       95                           T:          -47  QT:         488  QTc:        496    Interpretive Statements  Sinus rhythm  Atrial premature complex  Left atrial enlargement  Left ventricular hypertrophy  Anterior Q waves, possibly due to LVH  Nonspecific T abnormalities, inferior leads  Compared to ECG 10/20/2023 15:43:48  Atrial premature complex(es) now present  Atrial abnormality now present  Left ventricular hypertrophy now pr esent  Q waves now present  Sinus bradycardia no longer present  ST (T wave) deviation no longer present  Prolonged QT interval no longer present  Possible ischemia no longer present  T-wave abnormality still present       EKG; normal sinus rhythm at 62.  No ST elevation or ST depression.  QTc is 496.  Compared to EKG from 10/20/2023    RADIOLOGY/PROCEDURES   I have independently interpreted the diagnostic imaging associated with this visit and am waiting the final reading from the radiologist.   My preliminary interpretation is as follows: see below     Radiologist interpretation:  [id number] VIEW   Final Result      No dilated bowel loops or free air.          COURSE & MEDICAL DECISION MAKING    ASSESSMENT, COURSE AND PLAN  Care Narrative: This is a 58-year-old female here for evaluation of nausea vomiting diarrhea.  Patient is had mild episodes of antiemetics here, and still is vomiting.  LB from pharmacy states she will adjust the methadone.   Pt will need to come in via hospitalist for intractable  n/v.        DISPOSITION AND DISCUSSIONS  I have discussed management of the patient with the following physicians and YUSUF's: Hospitalist      FINAL DIAGNOSIS  1. Nausea and vomiting, unspecified vomiting type           Electronically signed by: Aris Guerra D.O., 5/14/2024 12:51 PM

## 2024-05-15 NOTE — CARE PLAN
The patient is Stable - Low risk of patient condition declining or worsening    Shift Goals  Clinical Goals: N/V control, pain management, Pt safety  Patient Goals: Pain Management, Rest  Family Goals: No family present    Progress made toward(s) clinical / shift goals:    Problem: Pain - Standard  Goal: Alleviation of pain or a reduction in pain to the patient’s comfort goal  Outcome: Progressing     Problem: Knowledge Deficit - Standard  Goal: Patient and family/care givers will demonstrate understanding of plan of care, disease process/condition, diagnostic tests and medications  Outcome: Progressing

## 2024-05-15 NOTE — ASSESSMENT & PLAN NOTE
Spent 3 minutes on smoking cessation education  93414 (smoking and tobacco cessation counseling visit; 3-10 min)     Nicotine patch ordered

## 2024-05-15 NOTE — PROGRESS NOTES
Arabella Cadet Bot[place]ourt has chosen to leave the hospital against medical advice. The attending physician has not discharged the patient. The provider is aware that the patient is leaving against medical advice. Patient expresses understanding of the risks of leaving the hospital and benefits of admission including but not limited to, the availability and proximity of nurses, physicians, monitoring, diagnostic testing, treatment, and a safe discharge plan. The patient had the opportunity to ask questions about their medical condition and recommended treatment.  Patient is aware that they may return for care at any time.    IV removed and home medications returned prior to pt leaving unit @ 1844.

## 2024-05-17 ENCOUNTER — HOSPITAL ENCOUNTER (EMERGENCY)
Facility: MEDICAL CENTER | Age: 59
End: 2024-05-17
Attending: EMERGENCY MEDICINE
Payer: MEDICARE

## 2024-05-17 VITALS
BODY MASS INDEX: 22.5 KG/M2 | HEART RATE: 81 BPM | DIASTOLIC BLOOD PRESSURE: 72 MMHG | RESPIRATION RATE: 20 BRPM | WEIGHT: 140 LBS | SYSTOLIC BLOOD PRESSURE: 125 MMHG | TEMPERATURE: 98 F | HEIGHT: 66 IN | OXYGEN SATURATION: 97 %

## 2024-05-17 DIAGNOSIS — F11.93 METHADONE WITHDRAWAL (HCC): ICD-10-CM

## 2024-05-17 DIAGNOSIS — R11.2 NAUSEA AND VOMITING, UNSPECIFIED VOMITING TYPE: ICD-10-CM

## 2024-05-17 DIAGNOSIS — F11.20 METHADONE DEPENDENCE (HCC): ICD-10-CM

## 2024-05-17 LAB
ANION GAP SERPL CALC-SCNC: 12 MMOL/L (ref 7–16)
BASOPHILS # BLD AUTO: 0.4 % (ref 0–1.8)
BASOPHILS # BLD: 0.03 K/UL (ref 0–0.12)
BUN SERPL-MCNC: 14 MG/DL (ref 8–22)
CALCIUM SERPL-MCNC: 9 MG/DL (ref 8.5–10.5)
CHLORIDE SERPL-SCNC: 102 MMOL/L (ref 96–112)
CO2 SERPL-SCNC: 24 MMOL/L (ref 20–33)
CREAT SERPL-MCNC: 0.72 MG/DL (ref 0.5–1.4)
EOSINOPHIL # BLD AUTO: 0.08 K/UL (ref 0–0.51)
EOSINOPHIL NFR BLD: 1.2 % (ref 0–6.9)
ERYTHROCYTE [DISTWIDTH] IN BLOOD BY AUTOMATED COUNT: 45.1 FL (ref 35.9–50)
GFR SERPLBLD CREATININE-BSD FMLA CKD-EPI: 96 ML/MIN/1.73 M 2
GLUCOSE SERPL-MCNC: 114 MG/DL (ref 65–99)
HCT VFR BLD AUTO: 38.4 % (ref 37–47)
HGB BLD-MCNC: 12.9 G/DL (ref 12–16)
IMM GRANULOCYTES # BLD AUTO: 0.04 K/UL (ref 0–0.11)
IMM GRANULOCYTES NFR BLD AUTO: 0.6 % (ref 0–0.9)
LYMPHOCYTES # BLD AUTO: 0.91 K/UL (ref 1–4.8)
LYMPHOCYTES NFR BLD: 13.2 % (ref 22–41)
MCH RBC QN AUTO: 32.9 PG (ref 27–33)
MCHC RBC AUTO-ENTMCNC: 33.6 G/DL (ref 32.2–35.5)
MCV RBC AUTO: 98 FL (ref 81.4–97.8)
MONOCYTES # BLD AUTO: 0.37 K/UL (ref 0–0.85)
MONOCYTES NFR BLD AUTO: 5.3 % (ref 0–13.4)
NEUTROPHILS # BLD AUTO: 5.49 K/UL (ref 1.82–7.42)
NEUTROPHILS NFR BLD: 79.3 % (ref 44–72)
NRBC # BLD AUTO: 0 K/UL
NRBC BLD-RTO: 0 /100 WBC (ref 0–0.2)
PLATELET # BLD AUTO: 204 K/UL (ref 164–446)
PMV BLD AUTO: 10.4 FL (ref 9–12.9)
POTASSIUM SERPL-SCNC: 3.2 MMOL/L (ref 3.6–5.5)
RBC # BLD AUTO: 3.92 M/UL (ref 4.2–5.4)
SODIUM SERPL-SCNC: 138 MMOL/L (ref 135–145)
WBC # BLD AUTO: 6.9 K/UL (ref 4.8–10.8)

## 2024-05-17 RX ORDER — SODIUM CHLORIDE, SODIUM LACTATE, POTASSIUM CHLORIDE, CALCIUM CHLORIDE 600; 310; 30; 20 MG/100ML; MG/100ML; MG/100ML; MG/100ML
1000 INJECTION, SOLUTION INTRAVENOUS ONCE
Status: COMPLETED | OUTPATIENT
Start: 2024-05-17 | End: 2024-05-17

## 2024-05-17 RX ORDER — ONDANSETRON 4 MG/1
4 TABLET, ORALLY DISINTEGRATING ORAL EVERY 6 HOURS PRN
Qty: 10 TABLET | Refills: 0 | Status: SHIPPED | OUTPATIENT
Start: 2024-05-17

## 2024-05-17 RX ORDER — ONDANSETRON 2 MG/ML
4 INJECTION INTRAMUSCULAR; INTRAVENOUS ONCE
Status: COMPLETED | OUTPATIENT
Start: 2024-05-17 | End: 2024-05-17

## 2024-05-17 RX ORDER — PROCHLORPERAZINE EDISYLATE 5 MG/ML
10 INJECTION INTRAMUSCULAR; INTRAVENOUS ONCE
Status: COMPLETED | OUTPATIENT
Start: 2024-05-17 | End: 2024-05-17

## 2024-05-17 RX ADMIN — PROCHLORPERAZINE EDISYLATE 10 MG: 5 INJECTION INTRAMUSCULAR; INTRAVENOUS at 06:59

## 2024-05-17 RX ADMIN — SODIUM CHLORIDE, POTASSIUM CHLORIDE, SODIUM LACTATE AND CALCIUM CHLORIDE 1000 ML: 600; 310; 30; 20 INJECTION, SOLUTION INTRAVENOUS at 07:03

## 2024-05-17 RX ADMIN — FENTANYL CITRATE 100 MCG: 50 INJECTION, SOLUTION INTRAMUSCULAR; INTRAVENOUS at 06:55

## 2024-05-17 RX ADMIN — ONDANSETRON 4 MG: 2 INJECTION INTRAMUSCULAR; INTRAVENOUS at 09:36

## 2024-05-17 ASSESSMENT — FIBROSIS 4 INDEX: FIB4 SCORE: 1.39

## 2024-05-17 ASSESSMENT — PAIN DESCRIPTION - PAIN TYPE
TYPE: ACUTE PAIN
TYPE: ACUTE PAIN

## 2024-05-17 NOTE — ED NOTES
Bedside report received from SANDRA Boateng, assumed care of patient.  POC discussed with patient. Call light within reach, all needs addressed at this time.       Fall risk interventions in place: Move the patient closer to the nurse's station, Patient's personal possessions are with in their safe reach, Place socks on patient, Place fall risk sign on patient's door, Keep floor surfaces clean and dry, and Accompanied to restroom (all applicable per [place] Fall risk assessment)   Continuous monitoring: Cardiac Leads, Pulse Ox, or Blood Pressure  IVF/IV medications: Infusion per MAR (List Med(s)) LR  Oxygen: How many liters 2L and No oxygen tank in room  Bedside sitter: Not Applicable   Isolation: Not Applicable

## 2024-05-17 NOTE — ED NOTES
Pt discharged to home. Pt was given follow up instructions and prescriptions for zofran ODT. Pt verbalized understanding of all instructions for discharge and is ambulatory out of ED with steady gait. AOx4

## 2024-05-17 NOTE — ED TRIAGE NOTES
Arabella Anglin   58 y.o.     Chief Complaint   Patient presents with    Detox     Pt BRANDY ECHOLS from home for complaint of detoxing from methadone. Pt states she is weening herself off because the methadone makes her feel sick. Pt said she tried to go to [facility] but they didn't admit her.      A&O x 4. Pt is yelling out in pain grabbing her abdomin. 2 episodes of emesis.     EMS interventions: 100 fent, 4 mg zofran     Vitals:    05/17/24 0550   BP: (!) 207/96   Pulse: 63   Resp: 16   Temp: 36.6 °C (97.9 °F)   SpO2: 98%

## 2024-05-17 NOTE — ED PROVIDER NOTES
ED Provider Note    CHIEF COMPLAINT  Chief Complaint   Patient presents with    Detox     Pt BRANDY ECHOLS from home for complaint of detoxing from methadone. Pt states she is weening herself off because the methadone makes her feel sick. Pt said she tried to go to [facility] but they didn't admit her.        EXTERNAL RECORDS REVIEWED  Patient's last encounter was a hospital admission May 14.  Patient presented with intractable nausea vomiting diarrhea.  History of chronic back pain no fever or chills noted.  Additionally, patient was on methadone, trying to wean herself.    Prior to that patient was seen in the emergency department in October 2023 for similar symptoms.    Outpatient encounter, clinic visit in March 2022 for lower respiratory tract infection, and tobacco dependence.    HPI/ROS  LIMITATION TO HISTORY   Select: : None  OUTSIDE HISTORIAN(S):  EMS Fentanyl and zofran given in route    Arabella Anglin is a 58 y.o. female who presents to the emergency department via EMS with a chief complaint of abdominal pain.  Patient states she has been trying to get off methadone.  She was here on Wednesday night, had her dose of methadone.  She did not take it yesterday.  She presented to [facility] asking for help in detoxing but was told to return to clinic and titrate her dose down.  Her last dose of methadone was on Wednesday night.  She had planned to return to the methadone clinic but then she began having abdominal pain and nausea and vomiting, prompting a call to 911 and a return here for the symptoms.  She does have a history of cyclic vomiting syndrome.  She also reports a history of chronic back pain and diverticulosis.  She was treated with fentanyl and Zofran prior to arrival by EMS.    PAST MEDICAL HISTORY   has a past medical history of Anxiety, Chronic back pain, Cyclic vomiting syndrome, Decreased GFR (05/01/2019), Macrocytosis without anemia (02/19/2019), PVC (premature ventricular  "contraction), and Vomiting (02/15/2019).    SURGICAL HISTORY   has a past surgical history that includes open reduction; primary c section; tonsillectomy; knee arthroscopy; and breast augmentation with implant.    FAMILY HISTORY  Family History   Problem Relation Age of Onset    Heart Disease Mother     Diabetes Mother     Psychiatric Illness Father        SOCIAL HISTORY  Social History     Tobacco Use    Smoking status: Every Day     Types: Cigarettes    Smokeless tobacco: Never   Vaping Use    Vaping status: Never Used   Substance and Sexual Activity    Alcohol use: No     Comment: denies    Drug use: Yes     Types: Inhaled, Marijuana    Sexual activity: Not on file       CURRENT MEDICATIONS  Home Medications       Reviewed by Tasneem Sandoval R.N. (Registered Nurse) on 05/17/24 at 0552  Med List Status: Partial     Medication Last Dose Status   celecoxib (CELEBREX) 200 MG Cap  Active   METHADONE HCL PO  Active   traZODone (DESYREL) 150 MG Tab  Active                    ALLERGIES  No Known Allergies    PHYSICAL EXAM  VITAL SIGNS: /72   Pulse 81   Temp 36.7 °C (98 °F) (Temporal)   Resp 20   Ht 1.676 m (5' 6\")   Wt 63.5 kg (140 lb)   SpO2 97%   BMI 22.60 kg/m²    Vitals reviewed.  Constitutional: Patient is oriented to person, place, and time. Appears well-developed and well-nourished. Moderate distress.    Head: Normocephalic and atraumatic.   Mouth/Throat: Oropharynx is clear and moist.  Eyes: Conjunctivae are normal. Pupils are equal, round, and reactive to light.   Neck: Normal range of motion. Neck supple.  Cardiovascular: Normal rate, regular rhythm and normal heart sounds. Normal peripheral pulses.  Pulmonary/Chest: Effort normal and breath sounds normal. No respiratory distress, no wheezes, rhonchi, or rales.  Abdominal: Soft. Bowel sounds are normal. There is diffuse tenderness. No rebound or guarding, or peritoneal signs. No CVA tenderness.  Musculoskeletal: No edema and no tenderness. "   Neurological: Normal motor and sensory exam. No focal deficits.   Skin: Skin is warm and dry. No erythema. No pallor.   Psychiatric: Patient has a normal mood and affect.     EKG/LABS  Results for orders placed or performed during the hospital encounter of 05/17/24   CBC WITH DIFFERENTIAL   Result Value Ref Range    WBC 6.9 4.8 - 10.8 K/uL    RBC 3.92 (L) 4.20 - 5.40 M/uL    Hemoglobin 12.9 12.0 - 16.0 g/dL    Hematocrit 38.4 37.0 - 47.0 %    MCV 98.0 (H) 81.4 - 97.8 fL    MCH 32.9 27.0 - 33.0 pg    MCHC 33.6 32.2 - 35.5 g/dL    RDW 45.1 35.9 - 50.0 fL    Platelet Count 204 164 - 446 K/uL    MPV 10.4 9.0 - 12.9 fL    Neutrophils-Polys 79.30 (H) 44.00 - 72.00 %    Lymphocytes 13.20 (L) 22.00 - 41.00 %    Monocytes 5.30 0.00 - 13.40 %    Eosinophils 1.20 0.00 - 6.90 %    Basophils 0.40 0.00 - 1.80 %    Immature Granulocytes 0.60 0.00 - 0.90 %    Nucleated RBC 0.00 0.00 - 0.20 /100 WBC    Neutrophils (Absolute) 5.49 1.82 - 7.42 K/uL    Lymphs (Absolute) 0.91 (L) 1.00 - 4.80 K/uL    Monos (Absolute) 0.37 0.00 - 0.85 K/uL    Eos (Absolute) 0.08 0.00 - 0.51 K/uL    Baso (Absolute) 0.03 0.00 - 0.12 K/uL    Immature Granulocytes (abs) 0.04 0.00 - 0.11 K/uL    NRBC (Absolute) 0.00 K/uL   Basic Metabolic Panel   Result Value Ref Range    Sodium 138 135 - 145 mmol/L    Potassium 3.2 (L) 3.6 - 5.5 mmol/L    Chloride 102 96 - 112 mmol/L    Co2 24 20 - 33 mmol/L    Glucose 114 (H) 65 - 99 mg/dL    Bun 14 8 - 22 mg/dL    Creatinine 0.72 0.50 - 1.40 mg/dL    Calcium 9.0 8.5 - 10.5 mg/dL    Anion Gap 12.0 7.0 - 16.0   ESTIMATED GFR   Result Value Ref Range    GFR (CKD-EPI) 96 >60 mL/min/1.73 m 2     I have independently interpreted this EKG    RADIOLOGY/PROCEDURES       COURSE & MEDICAL DECISION MAKING    ASSESSMENT, COURSE AND PLAN  Care Narrative:     This is a 58-year-old female who presents with a history of methadone use.  Making an attempt to discontinue it on her own.  She appears to either be in withdrawal at this time for  her, this is an episode of cyclic vomiting.  Treated with fentanyl and Zofran and route by EMS.  She has an IV in place.  She is actively retching during my exam and interview.  Unfortunately, though ideally, restarting her methadone so she can titrate it more gradually, does not appear to be an option at this time as she cannot keep anything down.  For this reason, she is given additional opioids, antiemetics and she will be treated with IV fluids I have ordered labs.    9:35 AM patient is reevaluated at the bedside.  She reports feeling markedly better.  She was able to tolerate an oral dose of methadone.  Labs show a low potassium 3.2 but were otherwise reassuring.  White blood cell count is normal she is not anemic.  She is comfortable returning to the methadone clinic.  She needs to be there by 10:30 AM.  She understands that they can help her titrate her dose down.  Abruptly stopping this medication which causes withdrawal symptoms and she is advised against this.  She is improved.  She is well-appearing.  She is discharged home in stable condition with strict return precautions.    Hydration: Based on the patient's presentation of Acute Vomiting and Dehydration the patient was given IV fluids. IV Hydration was used because oral hydration was not adequate alone. Upon recheck following hydration, the patient was improved.     ADDITIONAL PROBLEMS MANAGED    DISPOSITION AND DISCUSSIONS  I have discussed management of the patient with the following physicians and YUSUF's:  None    Discussion of management with other Q or appropriate source(s): None     Escalation of care considered, and ultimately not performed:acute inpatient care management, however at this time, the patient is most appropriate for outpatient management    Barriers to care at this time, including but not limited to: None.     Decision tools and prescription drugs considered including, but not limited to: None    FINAL DIAGNOSIS  1. Methadone  dependence (HCC)    2. Nausea and vomiting, unspecified vomiting type    3. Methadone withdrawal (HCC)        Electronically signed by: Lauren Roth D.O., 5/17/2024 6:55 AM

## 2024-09-12 ENCOUNTER — HOSPITAL ENCOUNTER (EMERGENCY)
Facility: MEDICAL CENTER | Age: 59
End: 2024-09-12
Attending: EMERGENCY MEDICINE
Payer: MEDICARE

## 2024-09-12 VITALS
OXYGEN SATURATION: 96 % | TEMPERATURE: 98 F | RESPIRATION RATE: 31 BRPM | HEART RATE: 67 BPM | WEIGHT: 139.99 LBS | HEIGHT: 66 IN | BODY MASS INDEX: 22.5 KG/M2 | SYSTOLIC BLOOD PRESSURE: 175 MMHG | DIASTOLIC BLOOD PRESSURE: 91 MMHG

## 2024-09-12 DIAGNOSIS — R11.2 NAUSEA AND VOMITING, UNSPECIFIED VOMITING TYPE: ICD-10-CM

## 2024-09-12 DIAGNOSIS — R10.13 EPIGASTRIC PAIN: ICD-10-CM

## 2024-09-12 LAB
ALBUMIN SERPL BCP-MCNC: 4.5 G/DL (ref 3.2–4.9)
ALBUMIN/GLOB SERPL: 1.7 G/DL
ALP SERPL-CCNC: 54 U/L (ref 30–99)
ALT SERPL-CCNC: 10 U/L (ref 2–50)
ANION GAP SERPL CALC-SCNC: 16 MMOL/L (ref 7–16)
AST SERPL-CCNC: 16 U/L (ref 12–45)
BASOPHILS # BLD AUTO: 0.5 % (ref 0–1.8)
BASOPHILS # BLD: 0.03 K/UL (ref 0–0.12)
BILIRUB SERPL-MCNC: 0.7 MG/DL (ref 0.1–1.5)
BUN SERPL-MCNC: 14 MG/DL (ref 8–22)
CALCIUM ALBUM COR SERPL-MCNC: 8.7 MG/DL (ref 8.5–10.5)
CALCIUM SERPL-MCNC: 9.1 MG/DL (ref 8.5–10.5)
CHLORIDE SERPL-SCNC: 102 MMOL/L (ref 96–112)
CO2 SERPL-SCNC: 20 MMOL/L (ref 20–33)
CREAT SERPL-MCNC: 0.64 MG/DL (ref 0.5–1.4)
EOSINOPHIL # BLD AUTO: 0 K/UL (ref 0–0.51)
EOSINOPHIL NFR BLD: 0 % (ref 0–6.9)
ERYTHROCYTE [DISTWIDTH] IN BLOOD BY AUTOMATED COUNT: 47.1 FL (ref 35.9–50)
GFR SERPLBLD CREATININE-BSD FMLA CKD-EPI: 101 ML/MIN/1.73 M 2
GLOBULIN SER CALC-MCNC: 2.7 G/DL (ref 1.9–3.5)
GLUCOSE SERPL-MCNC: 144 MG/DL (ref 65–99)
HCT VFR BLD AUTO: 40.8 % (ref 37–47)
HGB BLD-MCNC: 14 G/DL (ref 12–16)
IMM GRANULOCYTES # BLD AUTO: 0.02 K/UL (ref 0–0.11)
IMM GRANULOCYTES NFR BLD AUTO: 0.3 % (ref 0–0.9)
LIPASE SERPL-CCNC: 15 U/L (ref 11–82)
LYMPHOCYTES # BLD AUTO: 0.65 K/UL (ref 1–4.8)
LYMPHOCYTES NFR BLD: 10.1 % (ref 22–41)
MCH RBC QN AUTO: 33.7 PG (ref 27–33)
MCHC RBC AUTO-ENTMCNC: 34.3 G/DL (ref 32.2–35.5)
MCV RBC AUTO: 98.1 FL (ref 81.4–97.8)
MONOCYTES # BLD AUTO: 0.13 K/UL (ref 0–0.85)
MONOCYTES NFR BLD AUTO: 2 % (ref 0–13.4)
NEUTROPHILS # BLD AUTO: 5.59 K/UL (ref 1.82–7.42)
NEUTROPHILS NFR BLD: 87.1 % (ref 44–72)
NRBC # BLD AUTO: 0 K/UL
NRBC BLD-RTO: 0 /100 WBC (ref 0–0.2)
PLATELET # BLD AUTO: 206 K/UL (ref 164–446)
PMV BLD AUTO: 10.2 FL (ref 9–12.9)
POTASSIUM SERPL-SCNC: 3.4 MMOL/L (ref 3.6–5.5)
PROT SERPL-MCNC: 7.2 G/DL (ref 6–8.2)
RBC # BLD AUTO: 4.16 M/UL (ref 4.2–5.4)
SODIUM SERPL-SCNC: 138 MMOL/L (ref 135–145)
WBC # BLD AUTO: 6.4 K/UL (ref 4.8–10.8)

## 2024-09-12 PROCEDURE — 36415 COLL VENOUS BLD VENIPUNCTURE: CPT

## 2024-09-12 PROCEDURE — 94760 N-INVAS EAR/PLS OXIMETRY 1: CPT

## 2024-09-12 PROCEDURE — 83690 ASSAY OF LIPASE: CPT

## 2024-09-12 PROCEDURE — 700111 HCHG RX REV CODE 636 W/ 250 OVERRIDE (IP): Mod: JZ,UD | Performed by: EMERGENCY MEDICINE

## 2024-09-12 PROCEDURE — 80053 COMPREHEN METABOLIC PANEL: CPT

## 2024-09-12 PROCEDURE — 96375 TX/PRO/DX INJ NEW DRUG ADDON: CPT

## 2024-09-12 PROCEDURE — 99285 EMERGENCY DEPT VISIT HI MDM: CPT

## 2024-09-12 PROCEDURE — 85025 COMPLETE CBC W/AUTO DIFF WBC: CPT

## 2024-09-12 PROCEDURE — 700105 HCHG RX REV CODE 258: Mod: UD | Performed by: EMERGENCY MEDICINE

## 2024-09-12 PROCEDURE — 96374 THER/PROPH/DIAG INJ IV PUSH: CPT

## 2024-09-12 RX ORDER — METOCLOPRAMIDE HYDROCHLORIDE 5 MG/ML
10 INJECTION INTRAMUSCULAR; INTRAVENOUS ONCE
Status: COMPLETED | OUTPATIENT
Start: 2024-09-12 | End: 2024-09-12

## 2024-09-12 RX ORDER — SODIUM CHLORIDE 9 MG/ML
1000 INJECTION, SOLUTION INTRAVENOUS ONCE
Status: COMPLETED | OUTPATIENT
Start: 2024-09-12 | End: 2024-09-12

## 2024-09-12 RX ORDER — LORAZEPAM 2 MG/ML
1 INJECTION INTRAMUSCULAR ONCE
Status: COMPLETED | OUTPATIENT
Start: 2024-09-12 | End: 2024-09-12

## 2024-09-12 RX ORDER — ONDANSETRON 4 MG/1
4 TABLET, ORALLY DISINTEGRATING ORAL EVERY 6 HOURS PRN
Qty: 20 TABLET | Refills: 0 | Status: SHIPPED | OUTPATIENT
Start: 2024-09-12

## 2024-09-12 RX ORDER — DIPHENHYDRAMINE HYDROCHLORIDE 50 MG/ML
25 INJECTION INTRAMUSCULAR; INTRAVENOUS ONCE
Status: COMPLETED | OUTPATIENT
Start: 2024-09-12 | End: 2024-09-12

## 2024-09-12 RX ORDER — ONDANSETRON 2 MG/ML
4 INJECTION INTRAMUSCULAR; INTRAVENOUS ONCE
Status: COMPLETED | OUTPATIENT
Start: 2024-09-12 | End: 2024-09-12

## 2024-09-12 RX ADMIN — METOCLOPRAMIDE 10 MG: 5 INJECTION, SOLUTION INTRAMUSCULAR; INTRAVENOUS at 05:51

## 2024-09-12 RX ADMIN — MORPHINE SULFATE 5 MG: 10 INJECTION INTRAVENOUS at 04:12

## 2024-09-12 RX ADMIN — ONDANSETRON 4 MG: 2 INJECTION INTRAMUSCULAR; INTRAVENOUS at 04:12

## 2024-09-12 RX ADMIN — LORAZEPAM 1 MG: 2 INJECTION INTRAMUSCULAR; INTRAVENOUS at 05:35

## 2024-09-12 RX ADMIN — DIPHENHYDRAMINE HYDROCHLORIDE 25 MG: 50 INJECTION, SOLUTION INTRAMUSCULAR; INTRAVENOUS at 05:52

## 2024-09-12 RX ADMIN — SODIUM CHLORIDE 1000 ML: 9 INJECTION, SOLUTION INTRAVENOUS at 05:55

## 2024-09-12 ASSESSMENT — FIBROSIS 4 INDEX: FIB4 SCORE: 1.47

## 2024-09-12 ASSESSMENT — PAIN DESCRIPTION - PAIN TYPE: TYPE: ACUTE PAIN

## 2024-09-12 NOTE — ED NOTES
Pt given d/c instructions, f/u info and RX x 1 with verbal understanding.  VSS at discharge.  PIV d/c'd with tip intact.  Pt ambulatory from the ED w/ steady gait.  All belongings in possession on discharge.  Pt escorted to the lobby by RN.

## 2024-09-12 NOTE — ED TRIAGE NOTES
"Chief Complaint   Patient presents with    Abdominal Pain    N/V     Pt BIBA from home, EMS states since last night 10pm pt complaining of abdominal pain, nausea and vomiting. EMS states pt usually taking Methadone but unable to take last night due to vomiting. En route pt received Zofran 4 mg IV. Pt A0x4.      BP (!) 184/90   Pulse (!) 49   Temp 37.1 °C (98.7 °F) (Oral)   Resp 18   Ht 1.676 m (5' 6\")   Wt 63.5 kg (139 lb 15.9 oz)   SpO2 96%   BMI 22.60 kg/m²     "

## 2024-09-12 NOTE — ED PROVIDER NOTES
"ER Provider Note    Scribed for Huber Flores Ii, M.d. by Eran Sauceda. 9/12/2024  3:49 AM    Primary Care Provider: Laura Abrams D.O.    CHIEF COMPLAINT   Chief Complaint   Patient presents with    Abdominal Pain    N/V     Pt BIBA from home, EMS states since last night 10pm pt complaining of abdominal pain, nausea and vomiting. EMS states pt usually taking Methadone but unable to take last night due to vomiting. En route pt received Zofran 4 mg IV. Pt A0x4.      EXTERNAL RECORDS REVIEWED  Inpatient Notes Reviewed inpatient note which shows the patient was admitted on 5/14/24 for intractable nausea and vomiting. She was noted to have been unable to take her methadone due to nausea and vomiting. Within 4 hours of being admitted she said she was feeling better and left against medical advice.     HPI/ROS  LIMITATION TO HISTORY   Select: : None  OUTSIDE HISTORIAN(S):  EMS via nursing    Arabella Anglin is a 58 y.o. female who presents to the ED via EMS for evaluation of abdominal pain onset 10 PM. The patient reports associated symptoms of nausea and vomiting.  The patient reports a history of similar symptoms. She takes methadone, but notes she missed her dose this evening because she spilled it. She states she is out of her Zofran prescription and took Prilosec without relief. The patient states she has had workups for similar episodes to her current symptoms where she states \"they can't figure out what is wrong with me.\" She denies any abdominal surgeries including cholecystectomy.     PAST MEDICAL HISTORY  Past Medical History:   Diagnosis Date    Anxiety     Chronic back pain     Cyclic vomiting syndrome     Decreased GFR 05/01/2019    Macrocytosis without anemia 02/19/2019    PVC (premature ventricular contraction)     Vomiting 02/15/2019     SURGICAL HISTORY  Past Surgical History:   Procedure Laterality Date    KNEE ARTHROSCOPY      OPEN REDUCTION      ME BREAST AUGMENTATION WITH IMPLANT   " "   PRIMARY C SECTION      TONSILLECTOMY       FAMILY HISTORY  Family History   Problem Relation Age of Onset    Heart Disease Mother     Diabetes Mother     Psychiatric Illness Father      SOCIAL HISTORY   reports that she has been smoking cigarettes. She has never used smokeless tobacco. She reports current drug use. Drugs: Inhaled and Marijuana. She reports that she does not drink alcohol.    CURRENT MEDICATIONS  Previous Medications    CELECOXIB (CELEBREX) 200 MG CAP    Take 200 mg by mouth every day.    METHADONE HCL PO    Take 111 mg by mouth every day. [facility] on [address].    ONDANSETRON (ZOFRAN ODT) 4 MG TABLET DISPERSIBLE    Take 1 Tablet by mouth every 6 hours as needed for Nausea/Vomiting.    TRAZODONE (DESYREL) 150 MG TAB    TAKE 1 TABLET BY MOUTH ONCE DAILY AT BEDTIME     ALLERGIES  Patient has no known allergies.    PHYSICAL EXAM  BP (!) 184/90   Pulse (!) 49   Temp 37.1 °C (98.7 °F) (Oral)   Resp 18   Ht 1.676 m (5' 6\")   Wt 63.5 kg (139 lb 15.9 oz)   SpO2 96%   BMI 22.60 kg/m²   Physical Exam  Vitals and nursing note reviewed.   Constitutional:       Appearance: Normal appearance.      Comments: Vomiting into emesis bag. Normal appearing emesis    HENT:      Head: Normocephalic.   Eyes:      General: No scleral icterus.     Extraocular Movements: Extraocular movements intact.      Pupils: Pupils are equal, round, and reactive to light.   Cardiovascular:      Rate and Rhythm: Normal rate and regular rhythm.      Pulses: Normal pulses.   Pulmonary:      Effort: Pulmonary effort is normal.      Breath sounds: Normal breath sounds.   Abdominal:      General: There is no distension.      Tenderness: There is no abdominal tenderness. There is no guarding.   Skin:     Findings: No rash.   Neurological:      General: No focal deficit present.      Mental Status: She is alert.     DIAGNOSTIC STUDIES    EKG/LABS  Results for orders placed or performed during the hospital encounter " of 09/12/24   CBC WITH DIFFERENTIAL   Result Value Ref Range    WBC 6.4 4.8 - 10.8 K/uL    RBC 4.16 (L) 4.20 - 5.40 M/uL    Hemoglobin 14.0 12.0 - 16.0 g/dL    Hematocrit 40.8 37.0 - 47.0 %    MCV 98.1 (H) 81.4 - 97.8 fL    MCH 33.7 (H) 27.0 - 33.0 pg    MCHC 34.3 32.2 - 35.5 g/dL    RDW 47.1 35.9 - 50.0 fL    Platelet Count 206 164 - 446 K/uL    MPV 10.2 9.0 - 12.9 fL    Neutrophils-Polys 87.10 (H) 44.00 - 72.00 %    Lymphocytes 10.10 (L) 22.00 - 41.00 %    Monocytes 2.00 0.00 - 13.40 %    Eosinophils 0.00 0.00 - 6.90 %    Basophils 0.50 0.00 - 1.80 %    Immature Granulocytes 0.30 0.00 - 0.90 %    Nucleated RBC 0.00 0.00 - 0.20 /100 WBC    Neutrophils (Absolute) 5.59 1.82 - 7.42 K/uL    Lymphs (Absolute) 0.65 (L) 1.00 - 4.80 K/uL    Monos (Absolute) 0.13 0.00 - 0.85 K/uL    Eos (Absolute) 0.00 0.00 - 0.51 K/uL    Baso (Absolute) 0.03 0.00 - 0.12 K/uL    Immature Granulocytes (abs) 0.02 0.00 - 0.11 K/uL    NRBC (Absolute) 0.00 K/uL   COMP METABOLIC PANEL   Result Value Ref Range    Sodium 138 135 - 145 mmol/L    Potassium 3.4 (L) 3.6 - 5.5 mmol/L    Chloride 102 96 - 112 mmol/L    Co2 20 20 - 33 mmol/L    Anion Gap 16.0 7.0 - 16.0    Glucose 144 (H) 65 - 99 mg/dL    Bun 14 8 - 22 mg/dL    Creatinine 0.64 0.50 - 1.40 mg/dL    Calcium 9.1 8.5 - 10.5 mg/dL    Correct Calcium 8.7 8.5 - 10.5 mg/dL    AST(SGOT) 16 12 - 45 U/L    ALT(SGPT) 10 2 - 50 U/L    Alkaline Phosphatase 54 30 - 99 U/L    Total Bilirubin 0.7 0.1 - 1.5 mg/dL    Albumin 4.5 3.2 - 4.9 g/dL    Total Protein 7.2 6.0 - 8.2 g/dL    Globulin 2.7 1.9 - 3.5 g/dL    A-G Ratio 1.7 g/dL   LIPASE   Result Value Ref Range    Lipase 15 11 - 82 U/L   ESTIMATED GFR   Result Value Ref Range    GFR (CKD-EPI) 101 >60 mL/min/1.73 m 2          COURSE & MEDICAL DECISION MAKING     ASSESSMENT, COURSE AND PLAN  Care Narrative:     4:01 AM - Patient seen and examined at bedside. This is an evaluation of a 58 y.o. woman who presents to the ED with abdominal pain onset  yesterday.  Pain is epigastric. This is a recurring problem. She was unable to take methadone yesterday which is making issue worse. She does appear to be in some opiate withdrawal with increased blood pressure. Will give Zofran and morphine for symptoms. Abdominal pain work up will include cbc, cmp, lipase, UA. I suspect this is most likely a upper GI problem. Suspicion for a surgical emergency is low. She got no relief with Prilosec at home.     5:26 AM - I was informed the patient is vomiting and feeling anxious. Ordered Ativan 1 mg injection, Reglan 10 mg injection, benadryl 25 mg injection, and a bolus of NS IV 1L.     6:24 AM - Blood pressure improved to 142 systolic. She will start taking ice chips.     7:50 AM  Doing much better. She is agreeable to discharge home and she has already contacted her daughter to come and get her. She has not had any further vomiting or pain. I have refilled zofran for her. She was given instructions on when to return to the ED and she will follow up with her pain management providers.     PROBLEM LIST  #Intractable vomiting   -improved after multiple doses of antiemetics   -recurrent problem with exacerbation    #Chronic pain with opiate dependence   -missed dose of methadone but will be able to go to pain clinic for refills    DISPOSITION AND DISCUSSIONS      FINAL DIAGNOSIS  1. Nausea and vomiting, unspecified vomiting type    2. Epigastric pain       Eran MENCHACA (Dea), am scribing for, and in the presence of, PRETTY Shannon II.    Electronically signed by: Eran Sauceda (Dea), 9/12/2024    Huber MENCHACA II, M* personally performed the services described in this documentation, as scribed by Eran Sauceda in my presence, and it is both accurate and complete.      The note accurately reflects work and decisions made by me.  Huber Flores II, M.D.  9/12/2024  8:07 AM

## 2024-09-12 NOTE — ED NOTES
Pt continues to rest in gurney with eyes closed, positive chest rise. Continues connected to monitors, call light within reach.

## 2024-10-12 ENCOUNTER — HOSPITAL ENCOUNTER (OUTPATIENT)
Dept: RADIOLOGY | Facility: MEDICAL CENTER | Age: 59
End: 2024-10-12
Attending: FAMILY MEDICINE
Payer: MEDICARE

## 2024-10-12 DIAGNOSIS — G89.29 OTHER CHRONIC PAIN: ICD-10-CM

## 2024-10-12 DIAGNOSIS — Z23 NEED FOR VACCINATION: ICD-10-CM

## 2024-10-12 DIAGNOSIS — F11.20 METHADONE DEPENDENCE (HCC): ICD-10-CM

## 2024-10-12 DIAGNOSIS — M54.9 DORSALGIA: ICD-10-CM

## 2024-10-12 PROCEDURE — 72072 X-RAY EXAM THORAC SPINE 3VWS: CPT

## 2024-10-12 PROCEDURE — 72040 X-RAY EXAM NECK SPINE 2-3 VW: CPT

## 2024-10-12 PROCEDURE — 72100 X-RAY EXAM L-S SPINE 2/3 VWS: CPT
